# Patient Record
Sex: FEMALE | Race: WHITE | NOT HISPANIC OR LATINO | Employment: OTHER | URBAN - METROPOLITAN AREA
[De-identification: names, ages, dates, MRNs, and addresses within clinical notes are randomized per-mention and may not be internally consistent; named-entity substitution may affect disease eponyms.]

---

## 2017-03-22 ENCOUNTER — ALLSCRIPTS OFFICE VISIT (OUTPATIENT)
Dept: OTHER | Facility: OTHER | Age: 82
End: 2017-03-22

## 2017-03-22 LAB — HBA1C MFR BLD HPLC: 7.1 %

## 2017-04-06 ENCOUNTER — GENERIC CONVERSION - ENCOUNTER (OUTPATIENT)
Dept: OTHER | Facility: OTHER | Age: 82
End: 2017-04-06

## 2017-07-31 ENCOUNTER — GENERIC CONVERSION - ENCOUNTER (OUTPATIENT)
Dept: OTHER | Facility: OTHER | Age: 82
End: 2017-07-31

## 2017-07-31 LAB
LEFT EYE DIABETIC RETINOPATHY: NORMAL
RIGHT EYE DIABETIC RETINOPATHY: NORMAL

## 2017-08-04 ENCOUNTER — GENERIC CONVERSION - ENCOUNTER (OUTPATIENT)
Dept: OTHER | Facility: OTHER | Age: 82
End: 2017-08-04

## 2017-09-20 ENCOUNTER — ALLSCRIPTS OFFICE VISIT (OUTPATIENT)
Dept: OTHER | Facility: OTHER | Age: 82
End: 2017-09-20

## 2017-09-20 LAB — HBA1C MFR BLD HPLC: 7.2 %

## 2017-11-20 DIAGNOSIS — E78.5 HYPERLIPIDEMIA: ICD-10-CM

## 2017-11-20 DIAGNOSIS — E11.9 TYPE 2 DIABETES MELLITUS WITHOUT COMPLICATIONS (HCC): ICD-10-CM

## 2017-11-20 DIAGNOSIS — E11.65 TYPE 2 DIABETES MELLITUS WITH HYPERGLYCEMIA (HCC): ICD-10-CM

## 2017-11-20 DIAGNOSIS — Z00.00 ENCOUNTER FOR GENERAL ADULT MEDICAL EXAMINATION WITHOUT ABNORMAL FINDINGS: ICD-10-CM

## 2017-12-14 ENCOUNTER — TRANSCRIBE ORDERS (OUTPATIENT)
Dept: LAB | Facility: CLINIC | Age: 82
End: 2017-12-14

## 2017-12-14 ENCOUNTER — APPOINTMENT (OUTPATIENT)
Dept: LAB | Facility: CLINIC | Age: 82
End: 2017-12-14
Payer: MEDICARE

## 2017-12-14 DIAGNOSIS — Z00.00 ENCOUNTER FOR GENERAL ADULT MEDICAL EXAMINATION WITHOUT ABNORMAL FINDINGS: ICD-10-CM

## 2017-12-14 DIAGNOSIS — E78.5 HYPERLIPIDEMIA: ICD-10-CM

## 2017-12-14 DIAGNOSIS — E11.9 TYPE 2 DIABETES MELLITUS WITHOUT COMPLICATIONS (HCC): ICD-10-CM

## 2017-12-14 DIAGNOSIS — E11.65 TYPE 2 DIABETES MELLITUS WITH HYPERGLYCEMIA (HCC): ICD-10-CM

## 2017-12-14 LAB
ALBUMIN SERPL BCP-MCNC: 3.9 G/DL (ref 3.5–5)
ALP SERPL-CCNC: 73 U/L (ref 46–116)
ALT SERPL W P-5'-P-CCNC: 26 U/L (ref 12–78)
ANION GAP SERPL CALCULATED.3IONS-SCNC: 6 MMOL/L (ref 4–13)
AST SERPL W P-5'-P-CCNC: 19 U/L (ref 5–45)
BASOPHILS # BLD AUTO: 0.02 THOUSANDS/ΜL (ref 0–0.1)
BASOPHILS NFR BLD AUTO: 0 % (ref 0–1)
BILIRUB SERPL-MCNC: 0.64 MG/DL (ref 0.2–1)
BUN SERPL-MCNC: 17 MG/DL (ref 5–25)
CALCIUM SERPL-MCNC: 9.4 MG/DL (ref 8.3–10.1)
CHLORIDE SERPL-SCNC: 101 MMOL/L (ref 100–108)
CHOLEST SERPL-MCNC: 223 MG/DL (ref 50–200)
CO2 SERPL-SCNC: 28 MMOL/L (ref 21–32)
CREAT SERPL-MCNC: 0.68 MG/DL (ref 0.6–1.3)
EOSINOPHIL # BLD AUTO: 0.12 THOUSAND/ΜL (ref 0–0.61)
EOSINOPHIL NFR BLD AUTO: 2 % (ref 0–6)
ERYTHROCYTE [DISTWIDTH] IN BLOOD BY AUTOMATED COUNT: 12.8 % (ref 11.6–15.1)
GFR SERPL CREATININE-BSD FRML MDRD: 79 ML/MIN/1.73SQ M
GLUCOSE P FAST SERPL-MCNC: 181 MG/DL (ref 65–99)
HCT VFR BLD AUTO: 44.5 % (ref 34.8–46.1)
HDLC SERPL-MCNC: 62 MG/DL (ref 40–60)
HGB BLD-MCNC: 14.8 G/DL (ref 11.5–15.4)
LDLC SERPL CALC-MCNC: 139 MG/DL (ref 0–100)
LYMPHOCYTES # BLD AUTO: 1.93 THOUSANDS/ΜL (ref 0.6–4.47)
LYMPHOCYTES NFR BLD AUTO: 25 % (ref 14–44)
MCH RBC QN AUTO: 30.7 PG (ref 26.8–34.3)
MCHC RBC AUTO-ENTMCNC: 33.3 G/DL (ref 31.4–37.4)
MCV RBC AUTO: 92 FL (ref 82–98)
MONOCYTES # BLD AUTO: 0.83 THOUSAND/ΜL (ref 0.17–1.22)
MONOCYTES NFR BLD AUTO: 11 % (ref 4–12)
NEUTROPHILS # BLD AUTO: 4.69 THOUSANDS/ΜL (ref 1.85–7.62)
NEUTS SEG NFR BLD AUTO: 62 % (ref 43–75)
NRBC BLD AUTO-RTO: 0 /100 WBCS
PLATELET # BLD AUTO: 321 THOUSANDS/UL (ref 149–390)
PMV BLD AUTO: 9.7 FL (ref 8.9–12.7)
POTASSIUM SERPL-SCNC: 4.8 MMOL/L (ref 3.5–5.3)
PROT SERPL-MCNC: 7.8 G/DL (ref 6.4–8.2)
RBC # BLD AUTO: 4.82 MILLION/UL (ref 3.81–5.12)
SODIUM SERPL-SCNC: 135 MMOL/L (ref 136–145)
TRIGL SERPL-MCNC: 109 MG/DL
TSH SERPL DL<=0.05 MIU/L-ACNC: 3.02 UIU/ML (ref 0.36–3.74)
WBC # BLD AUTO: 7.62 THOUSAND/UL (ref 4.31–10.16)

## 2017-12-14 PROCEDURE — 84443 ASSAY THYROID STIM HORMONE: CPT

## 2017-12-14 PROCEDURE — 85025 COMPLETE CBC W/AUTO DIFF WBC: CPT

## 2017-12-14 PROCEDURE — 36415 COLL VENOUS BLD VENIPUNCTURE: CPT

## 2017-12-14 PROCEDURE — 80053 COMPREHEN METABOLIC PANEL: CPT

## 2017-12-14 PROCEDURE — 80061 LIPID PANEL: CPT

## 2017-12-17 ENCOUNTER — GENERIC CONVERSION - ENCOUNTER (OUTPATIENT)
Dept: OTHER | Facility: OTHER | Age: 82
End: 2017-12-17

## 2018-01-10 ENCOUNTER — ALLSCRIPTS OFFICE VISIT (OUTPATIENT)
Dept: OTHER | Facility: OTHER | Age: 83
End: 2018-01-10

## 2018-01-10 LAB — HBA1C MFR BLD HPLC: 7.9 %

## 2018-01-11 NOTE — RESULT NOTES
Verified Results  (1) COMPREHENSIVE METABOLIC PANEL [id number] 69:83GH Yolande Scott Order Number: [id number]     Test Name Result Flag Reference   GLUCOSE,RANDM 189 mg/dL H    If the patient is fasting, the ADA then defines impaired fasting glucose as > 100 mg/dL and diabetes as > or equal to 123 mg/dL  SODIUM 139 mmol/L  136-145   POTASSIUM 4 2 mmol/L  3 5-5 3   CHLORIDE 102 mmol/L  100-108   CARBON DIOXIDE 29 mmol/L  21-32   ANION GAP (CALC) 8 mmol/L  4-13   BLOOD UREA NITROGEN 26 mg/dL H 5-25   CREATININE 0 65 mg/dL  0 60-1 30   Standardized to IDMS reference method   CALCIUM 9 4 mg/dL  8 3-10 1   BILI, TOTAL 0 62 mg/dL  0 20-1 00   ALK PHOSPHATAS 73 U/L     ALT (SGPT) 24 U/L  12-78   AST(SGOT) 10 U/L  5-45   ALBUMIN 3 9 g/dL  3 5-5 0   TOTAL PROTEIN 7 5 g/dL  6 4-8 2   eGFR Non-African American      >60 0 ml/min/1 73sq m   - Patient Instructions: This is a fasting blood test  Water,black tea or black  coffee only after 9:00pm the night before test Drink 2 glasses of water the morning of test - Patient Instructions: This bloodwork is non-fasting  Please drink two glasses of   water morning of bloodwork  National Kidney Disease Education Program recommendations are as follows:  GFR calculation is accurate only with a steady state creatinine  Chronic Kidney disease less than 60 ml/min/1 73 sq  meters  Kidney failure less than 15 ml/min/1 73 sq  meters  (1) CBC/PLT/DIFF [id number] 08:42AM [facility], [place], [facility] Order Number: [id number]     Test Name Result Flag Reference   WBC COUNT 8 49 Thousand/uL  4 31-10 16   RBC COUNT 4 89 Million/uL  3 81-5 12   HEMOGLOBIN 15 1 g/dL  11 5-15 4   HEMATOCRIT 44 9 %  34 8-46  1   MCV 92 fL  82-98   MCH 30 9 pg  26 8-34 3   MCHC 33 6 g/dL  31 4-37 4   RDW 12 6 %  11 6-15 1   MPV 10 1 fL  8 9-12 7   PLATELET COUNT 219 Thousands/uL  149-390   nRBC AUTOMATED 0 /100 WBCs     NEUTROPHILS RELATIVE PERCENT 62 %  43-75   LYMPHOCYTES RELATIVE PERCENT 25 %  14-44   MONOCYTES RELATIVE PERCENT 12 %  4-12   EOSINOPHILS RELATIVE PERCENT 1 %  0-6   BASOPHILS RELATIVE PERCENT 0 %  0-1   NEUTROPHILS ABSOLUTE COUNT 5 21 Thousands/?L  1 85-7 62   LYMPHOCYTES ABSOLUTE COUNT 2 14 Thousands/?L  0 60-4 47   MONOCYTES ABSOLUTE COUNT 1 03 Thousand/?L  0 17-1 22   EOSINOPHILS ABSOLUTE COUNT 0 07 Thousand/?L  0 00-0 61   BASOPHILS ABSOLUTE COUNT 0 02 Thousands/?L  0 00-0 10   - Patient Instructions: This bloodwork is non-fasting  Please drink two glasses of water morning of bloodwork  - Patient Instructions: This bloodwork is non-fasting  Please drink two glasses of water morning of bloodwork  (1) HEMOGLOBIN A1C [id number] 08:42AM [facility] Order Number: [id number]     Test Name Result Flag Reference   HEMOGLOBIN A1C 7 1 % H 4 2-6 3   EST  AVG  GLUCOSE 157 mg/dl       (1) LIPID PANEL, FASTING [id number] 08:42AM [facility] Order Number: [id number]     Test Name Result Flag Reference   CHOLESTEROL 218 mg/dL H    HDL,DIRECT 64 mg/dL H 40-60   Specimen collection should occur prior to Metamizole administration due to the potential for falsely depressed results  LDL CHOLESTEROL CALCULATED 136 mg/dL H 0-100   - Patient Instructions: This is a fasting blood test  Water,black tea or black  coffee only after 9:00pm the night before test   Drink 2 glasses of water the morning of test     - Patient Instructions: This is a fasting blood test  Water,black tea or black  coffee only after 9:00pm the night before test Drink 2 glasses of water the morning of test - Patient Instructions: This bloodwork is non-fasting  Please drink two glasses of   water morning of bloodwork    Triglyceride:         Normal              <150 mg/dl       Borderline High    150-199 mg/dl       High               200-499 mg/dl       Very High          >499 mg/dl  Cholesterol:         Desirable        <200 mg/dl      Borderline High  200-239 mg/dl      High >239 mg/dl  HDL Cholesterol:        High    >59 mg/dL      Low     <41 mg/dL  LDL CALCULATED:    This screening LDL is a calculated result  It does not have the accuracy of the Direct Measured LDL in the monitoring of patients with hyperlipidemia and/or statin therapy  Direct Measure LDL (DFK100) must be ordered separately in these patients  TRIGLYCERIDES 92 mg/dL  <=150   Specimen collection should occur prior to N-Acetylcysteine or Metamizole administration due to the potential for falsely depressed results  (1) TSH [id number] 08:42AM Parksadelso Woodall    Order Number: [id number]     Test Name Result Flag Reference   TSH 3 000 uIU/mL  0 358-3 740   - Patient Instructions: This bloodwork is non-fasting  Please drink two glasses of water morning of bloodwork  - Patient Instructions: This is a fasting blood test  Water,black tea or black  coffee only after 9:00pm the night before test Drink 2 glasses of water the morning of test - Patient Instructions: This bloodwork is non-fasting  Please drink two glasses of   water morning of bloodwork  Patients undergoing fluorescein dye angiography may retain small amounts of fluorescein in the body for 48-72 hours post procedure  Samples containing fluorescein can produce falsely depressed TSH values  If the patient had this procedure,a specimen should be resubmitted post fluorescein clearance            The recommended reference ranges for TSH during pregnancy are as follows:  First trimester 0 1 to 2 5 uIU/mL  Second trimester  0 2 to 3 0 uIU/mL  Third trimester 0 3 to 3 0 uIU/m

## 2018-01-11 NOTE — RESULT NOTES
Verified Results  (1) HEMOGLOBIN A1C [id number] 11:01AM Stephanie Randle   TW Order Number: [id number]  TW Order Number: [id number]     Test Name Result Flag Reference   HEMOGLOBIN A1C 7 5 % H 4 2-6 3   EST  AVG  GLUCOSE 169 mg/dl       Blood Glucose- POC [id number] 10:30AM Stephanie Randle     Test Name Result Flag Reference   Glucose Finger Stick 80         Discussion/Summary   HgA1C continues to [employer] job!

## 2018-01-13 VITALS
TEMPERATURE: 97.5 F | OXYGEN SATURATION: 96 % | BODY MASS INDEX: 22.82 KG/M2 | HEART RATE: 100 BPM | SYSTOLIC BLOOD PRESSURE: 110 MMHG | HEIGHT: 62 IN | RESPIRATION RATE: 16 BRPM | DIASTOLIC BLOOD PRESSURE: 62 MMHG | WEIGHT: 124 LBS

## 2018-01-14 VITALS
HEART RATE: 80 BPM | SYSTOLIC BLOOD PRESSURE: 128 MMHG | TEMPERATURE: 97.2 F | WEIGHT: 132.6 LBS | HEIGHT: 62 IN | RESPIRATION RATE: 16 BRPM | DIASTOLIC BLOOD PRESSURE: 70 MMHG | BODY MASS INDEX: 24.4 KG/M2

## 2018-01-15 NOTE — PROGRESS NOTES
Assessment   1  Diabetes type 2, uncontrolled (250 02) (E11 65)   2  Arthritis (716 90) (M19 90)   3  Adult BMI 25 0-25 9 kg/sq m (V85 21) (Z68 25)   4  Hyperlipidemia (272 4) (E78 5)    Plan   Controlled diabetes mellitus    · MetFORMIN HCl - 500 MG Oral Tablet; take one tablet by mouth every day   · (1) HEMOGLOBIN A1C; Status:Complete;   Done: 74SOZ0901 10:58AM  Diabetes type 2, uncontrolled    · Hemoglobin A1c- POC; Status:Complete;   Done: 34PIX1273 11:02AM  Health Maintenance    · *VB - Urinary Incontinence Screen (Dx Z13 89 Screen for UI); Status:Complete;   Done:    40RIF3151 10:48AM    Discussion/Summary      Cont low fat, low chol, ADA diet as tolerated otc tylenol arthritis and analgesic cream over NSAID sec to hx htn  Possible side effects of new medications were reviewed with the patient/guardian today  The treatment plan was reviewed with the patient/guardian  The patient/guardian understands and agrees with the treatment plan      Chief Complaint   pt here to review BW and want to know whether she should take advil or tylenol ,she takes it everyday 2 to 6 tablets  tc/cma      History of Present Illness   79 yo pt- follow-up wnl Pt reports some noncompliance w diet over the Holidays  care utd  inc arthritic pains w the colder weather  Active Problems   1  Abnormal blood chemistry (790 6) (R79 9)   2  Abnormal blood sugar (790 29) (R73 09)   3  Arthritis (716 90) (M19 90)   4  Body mass index (BMI) of 23 0 to 23 9 in adult (V85 1) (Z68 23)   5  Body mass index (BMI) of 24 0 to 24 9 in adult (V85 1) (Z68 24)   6  Controlled diabetes mellitus (250 00) (E11 9)   7  Diabetes type 2, uncontrolled (250 02) (E11 65)   8  Hyperlipidemia (272 4) (E78 5)   9  Insomnia (780 52) (G47 00)   10  Limb pain (729 5) (M79 609)   11  Medicare annual wellness visit, subsequent (V70 0) (Z00 00)   12  Pain in joint (719 40) (M25 50)   13  Pain of upper extremity, unspecified laterality (729 5) (M79 603)   14   Right shoulder pain (719 41) (M25 511)    Past Medical History   1  History of Abrasion of knee, right (916 0) (S80 211A)   2  History of Cellulitis (682 9) (L03 90)   3  History of [place] [employer] On [address] [facility] (Not Motorcycle (Z504 6)   4  History of Dysfunction of Eustachian tube, unspecified laterality (381 81) (H69 80)   5  History of Encounter for Medicare annual wellness exam (V70 0) (Z00 00)   6  History of Fall, accidental (E888 9) (W19 XXXA)   7  History of cataract (V12 49) (Z86 69)   8  History of fatigue (V13 89) (Z87 898)   9  History of sinusitis (V12 69) (Z87 09)   10  History of Left Ankle Joint Pain (719 47)   11  History of Neck Strain (847 0)   12  History of Preop examination (V72 84) (Z01 818)    Family History   Family History    1  No pertinent family history    Social History    · Daily caffeine consumption   · Never a smoker   · Never A Smoker   · No drug use   · Occasional alcohol use   ·     Current Meds    1  Advil 200 MG Oral Capsule; Therapy: (Recorded:53Thr6732) to Recorded   2  MetFORMIN HCl - 500 MG Oral Tablet; take one tablet by mouth every day; Therapy: 19CKL1041 to (Last Oletha Sandifer)  Requested for: 20Sep2017 Ordered   3  PreserVision AREDS Oral Capsule; Therapy: (Recorded:89Cdi1331) to Recorded     The medication list was reviewed and updated today  Allergies   1  No Known Drug Allergies    Vitals   Vital Signs    Recorded: 19ZXB5802 10:36AM   Temperature 97 7 F, Temporal   Heart Rate 100, L Radial   Pulse Quality Normal, L Radial   Respiration Quality Normal   Respiration 16   Systolic 531, LUE, Sitting   Diastolic 72, LUE, Sitting   Height 5 ft 1 5 in   Weight 135 lb    BMI Calculated 25 1   BSA Calculated 1 61     Physical Exam        Constitutional      General appearance: No acute distress, well appearing and well nourished  Pulmonary      Respiratory effort: No increased work of breathing or signs of respiratory distress  Auscultation of lungs: Clear to auscultation  Cardiovascular      Auscultation of heart: Normal rate and rhythm, normal S1 and S2, without murmurs  Abdomen      Abdomen: Non-tender, no masses  Neurologic      Cranial nerves: Cranial nerves 2-12 intact  Psychiatric      Orientation to person, place, and time: Normal        Mood and affect: Normal           Results/Data   Hemoglobin A1c- POC 31XWX2755 11:02AM Homar Payne      Test Name Result Flag Reference   HEMOGLOBIN A1C 7 9        (1) HEMOGLOBIN A1C 48UWE6159 10:58AM Homar Payne      Test Name Result Flag Reference   HEMOGLOBIN A1C 7 9        *VB - Urinary Incontinence Screen (Dx Z13 89 Screen for UI) 46DPS7487 10:48AM Homar Payne      Test Name Result Flag Reference   Urinary Incontinence Assessment 77ODT9964          Future Appointments      Date/Time Provider Specialty Site   06/04/2018 10:00 AM JOSE Joseph   Family Medicine [address]     Signatures    Electronically signed by : JOSE Santiago ; Jan 14 2018  8:05PM EST                       (Author)

## 2018-01-17 NOTE — RESULT NOTES
Verified Results  (1) HEMOGLOBIN A1C 05Apr2016 08:32AM Ophelia Solis   TW Order Number: [id number]      5 7-6 4% impaired fasting glucose  >=6 5% diagnosis of diabetes    Falsely low levels are seen in conditions linked to short RBC life span-  hemolytic anemia, and splenomegaly  Falsely elevated levels are seen in situations where there is an increased production of RBC- receipt of erythropoietin or blood transfusions  Adopted from ADA-Clinical Practice Recommendations     Test Name Result Flag Reference   HEMOGLOBIN A1C 8 3 % H 4 0-5 6   EST  AVG  GLUCOSE 192 mg/dl       (1) COMPREHENSIVE METABOLIC PANEL 80ATE3966 25:02GL Ophelia Solis   TW Order Number: [id number]      National Kidney Disease Education Program recommendations are as follows:  GFR calculation is accurate only with a steady state creatinine  Chronic Kidney disease less than 60 ml/min/1 73 sq  meters  Kidney failure less than 15 ml/min/1 73 sq  meters  Test Name Result Flag Reference   GLUCOSE,RANDM 209 mg/dL H    SODIUM 138 mmol/L  136-145   POTASSIUM 4 4 mmol/L  3 5-5 3   CHLORIDE 103 mmol/L  100-108   CARBON DIOXIDE 29 mmol/L  21-32   ANION GAP (CALC) 6 mmol/L  4-13   BLOOD UREA NITROGEN 23 mg/dL  5-25   CREATININE 0 63 mg/dL  0 60-1 30   Standardized to IDMS reference method   CALCIUM 8 6 mg/dL  8 3-10 1   BILI, TOTAL 0 65 mg/dL  0 20-1 00   ALK PHOSPHATAS 72 U/L     ALT (SGPT) 19 U/L  12-78   AST(SGOT) 11 U/L  5-45   ALBUMIN 3 9 g/dL  3 5-5 0   TOTAL PROTEIN 7 1 g/dL  6 4-8 2   eGFR Non-African American      >60 0 ml/min/1 73sq m       Discussion/Summary   +Improvement but not yet at goal-would continue to watch diet and continue on the metformin once daily-please call with any further questions/concerns   Best Regards-Dr Keller Si

## 2018-01-22 VITALS
TEMPERATURE: 97.7 F | RESPIRATION RATE: 16 BRPM | HEART RATE: 100 BPM | BODY MASS INDEX: 24.84 KG/M2 | DIASTOLIC BLOOD PRESSURE: 72 MMHG | WEIGHT: 135 LBS | SYSTOLIC BLOOD PRESSURE: 132 MMHG | HEIGHT: 62 IN

## 2018-01-23 NOTE — PROGRESS NOTES
Assessment    1  Medicare annual wellness visit, subsequent (V70 0) (Z00 00)    Discussion/Summary  Impression: Subsequent Annual Wellness Visit, with preventive exam as well as age and risk appropriate counseling completed  Cardiovascular screening and counseling: the risks and benefits of screening were discussed, counseling was given on maintaining a healthy diet, counseling was given on maintaining a healthy weight, counseling was given on ways to improve cholesterol, counseling was given on ways to improve blood pressure and counseling was given on ways to improve exercise tolerance  Diabetes screening and counseling: the risks and benefits of screening were discussed, counseling was given on maintaining a healthy diet, counseling was given on maintaining a healthy weight and counseling was given on ways to improve physical activity  Colorectal cancer screening and counseling: the risks and benefits of screening were discussed and counseling was given on ways to eat a high fiber diet  Breast cancer screening and counseling: the risks and benefits of screening were discussed  Cervical cancer screening and counseling: screening not indicated  Osteoporosis screening and counseling: the risks and benefits of screening were discussed, counseling was given on obtaining adequate amounts of calcium and vitamin D on a daily basis and counseling was given on the importance of regular weightbearing exercise  Glaucoma screening and counseling: the risks and benefits of screening were discussed and screening is current  HIV screening and counseling: screening not indicated   Immunizations: the risks and benefits of influenza vaccination were discussed with the patient, the patient declines the influenza vaccination, the risks and benefits of pneumococcal vaccination were discussed with the patient, hepatitis B vaccination series is not indicated at this time due to the patient's low risk of dane the disease, the risks and benefits of the Zostavax vaccine were discussed with the patient, the risks and benefits of the Td vaccine were discussed with the patient and the risks and benefits of the Tdap vaccine were discussed with the patient  Advance Directive Planning: paperwork and instructions were given to the patient, she was encouraged to follow-up with me to discuss her questions and/or decisions  Patient Discussion: plan discussed with the patient, follow-up as needed  Chief Complaint  pt here for AWV      History of Present Illness  The patient is being seen for the subsequent annual wellness visit  Medicare Screening and Risk Factors   Hospitalizations: no previous hospitalizations  Once per lifetime medicare screening tests: ECG has not been done and AAA screening US has not yet been done  Medicare Screening Tests Risk Questions   Osteoporosis risk assessment: , female gender and over 48years of age, but none indicated  HIV risk assessment: none indicated  Drug and Alcohol Use: The patient has never smoked cigarettes  The patient reports never drinking alcohol  She has never used illicit drugs  Diet and Physical Activity: Current diet includes well balanced meals  She exercises daily and house work  Exercise: stretching  Mood Disorder and Cognitive Impairment Screening: She denies feeling down, depressed, or hopeless over the past two weeks  She denies feeling little interest or pleasure in doing things over the past two weeks  Cognitive impairment screening: denies difficulty learning/retaining new information, denies difficulty handling complex tasks, denies difficulty with reasoning, denies difficulty with spatial ability and orientation, denies difficulty with language and denies difficulty with behavior  Functional Ability/Level of Safety: Hearing is normal bilaterally, normal in the right ear and normal in the left ear  She denies hearing difficulties   She does not use a hearing aid  The patient is currently able to do activities of daily living with limitations, able to do instrumental activities of daily living with limitations, able to participate in social activities with limitations and unable to drive  Activities of daily living details: does not need help using the phone, no transportation help needed, does not need help shopping, no meal preparation help needed, does not need help doing housework, does not need help doing laundry, does not need help managing medications and does not need help managing money  Fall risk factors: The patient fell 0 times in the past 12 months  Injury History: no polypharmacy, no alcohol use, no mobility impairment, no antidepressant use, no deconditioning, no postural hypotension, no sedative use, no visual impairment, no urinary incontinence, no antihypertensive use, no cognitive impairment, up and go test was normal and no previous fall  Home safety risk factors:  loose rugs and no grab bars in the bathroom, but no unfamiliar surroundings, no poor household lighting, no uneven floors, no household clutter and handrails on the stairs  Advance Directives: Advance directives: Five Wishes Pamphlet given to patient, but no living will, no durable power of  for health care directives and no advance directives  end of life decisions were reviewed with the patient  Co-Managers and Medical Equipment/Suppliers: See Patient Care Team      Patient Care Team    Care Team Member Role Specialty Office Number   [id number] Lester Akins MD  Orthopedic Surgery [phone]   Sameer Roca MD  Ophthalmology [phone]     Active Problems    1  Abnormal blood chemistry (790 6) (R79 9)   2  Abnormal blood sugar (790 29) (R73 09)   3  Arthritis (716 90) (M17 90)   4  Body mass index (BMI) of 23 0 to 23 9 in adult (V85 1) (Z68 23)   5  Body mass index (BMI) of 24 0 to 24 9 in adult (V85 1) (Z68 24)   6   Controlled diabetes mellitus (250 00) (E11 9)   7  Diabetes type 2, uncontrolled (250 02) (E11 65)   8  Hyperlipidemia (272 4) (E78 5)   9  Insomnia (780 52) (G47 00)   10  Limb pain (729 5) (M79 609)   11  Medicare annual wellness visit, subsequent (V70 0) (Z00 00)   12  Pain in joint (719 40) (M25 50)   13  Pain of upper extremity, unspecified laterality (729 5) (M79 603)   14  Right shoulder pain (719 41) (M25 511)    Past Medical History    1  History of Abrasion of knee, right (916 0) (S80 211A)   2  History of Cellulitis (682 9) (L03 90)   3  History of [place] [employer] On [address] [facility] (Not Motorcycle (O435 4)   4  History of Dysfunction of Eustachian tube, unspecified laterality (381 81) (H69 80)   5  History of Encounter for Medicare annual wellness exam (V70 0) (Z00 00)   6  History of Fall, accidental (E888 9) (W19 XXXA)   7  History of cataract (V12 49) (Z86 69)   8  History of fatigue (V13 89) (Z87 898)   9  History of sinusitis (V12 69) (Z87 09)   10  History of Left Ankle Joint Pain (719 47)   11  History of Neck Strain (847 0)   12  History of Preop examination (V72 84) (J38 773)    The active problems and past medical history were reviewed and updated today  Surgical History    The surgical history was reviewed and updated today  Family History  Family History    1  No pertinent family history    The family history was reviewed and updated today  Social History    · Daily caffeine consumption   · Never a smoker   · Never A Smoker   · No drug use   · Occasional alcohol use   ·   The social history was reviewed and updated today  Current Meds   1  Advil 200 MG Oral Capsule; Therapy: (Recorded:26Oct2015) to Recorded   2  MetFORMIN HCl - 500 MG Oral Tablet; take one tablet by mouth every day; Therapy: 09EAG9475 to (Last Pancho Omalley)  Requested for: 20Sep2017 Ordered   3  PreserVision AREDS Oral Capsule;    Therapy: (Recorded:12Oct2016) to Recorded    The medication list was reviewed and updated today  Allergies    1  No Known Drug Allergies    Immunizations  Influenza --- Erven Brod: Temporarily Deferred: Pt refuses   Pneumo Other --- Series1: 2008     Vitals  Signs   Recorded: 88MPM4245 10:36AM   Temperature: 97 7 F, Temporal  Heart Rate: 100, L Radial  Pulse Quality: Normal, L Radial  Respiration Quality: Normal  Respiration: 16  Systolic: 761, LUE, Sitting  Diastolic: 72, LUE, Sitting  Height: 5 ft 1 5 in  Weight: 135 lb   BMI Calculated: 25 1  BSA Calculated: 1 61    Results/Data  Hemoglobin A1c- POC 49JCQ4434 11:02AM Wilman Novoa     Test Name Result Flag Reference   HEMOGLOBIN A1C 7 9       (1) HEMOGLOBIN A1C 93FIZ0595 10:58AM Edger Poor     Test Name Result Flag Reference   HEMOGLOBIN A1C 7 9       *VB - Urinary Incontinence Screen (Dx Z13 89 Screen for UI) 91SQE2376 10:48AM Wilman Novoa     Test Name Result Flag Reference   Urinary Incontinence Assessment [id number]         Procedure    Procedure:  dr William Denis yearly  Future Appointments    Date/Time Provider Specialty Site   06/04/2018 10:00 AM JOSE Palacios   Family Medicine [address]     Signatures   Electronically signed by : JOSE Moya ; Jan 14 2018  7:52PM EST                       (Author)

## 2018-01-23 NOTE — RESULT NOTES
Discussion/Summary   please call to discuss any further questions/conerns--Best Regards--Dr CANALES     Verified Results  (1) COMPREHENSIVE METABOLIC PANEL [id number] 93:52AA Jay Rubin Order Number: [id number]     Test Name Result Flag Reference   SODIUM 135 mmol/L L 136-145   POTASSIUM 4 8 mmol/L  3 5-5 3   CHLORIDE 101 mmol/L  100-108   CARBON DIOXIDE 28 mmol/L  21-32   ANION GAP (CALC) 6 mmol/L  4-13   BLOOD UREA NITROGEN 17 mg/dL  5-25   CREATININE 0 68 mg/dL  0 60-1 30   Standardized to IDMS reference method   CALCIUM 9 4 mg/dL  8 3-10 1   BILI, TOTAL 0 64 mg/dL  0 20-1 00   ALK PHOSPHATAS 73 U/L     ALT (SGPT) 26 U/L  12-78   Specimen collection should occur prior to Sulfasalazine and/or Sulfapyridine administration due to the potential for falsely depressed results  AST(SGOT) 19 U/L  5-45   Specimen collection should occur prior to Sulfasalazine administration due to the potential for falsely depressed results  ALBUMIN 3 9 g/dL  3 5-5 0   TOTAL PROTEIN 7 8 g/dL  6 4-8 2   eGFR 79 ml/min/1 73sq m     [facility] Disease Education Program recommendations are as follows:  GFR calculation is accurate only with a steady state creatinine  Chronic Kidney disease less than 60 ml/min/1 73 sq  meters  Kidney failure less than 15 ml/min/1 73 sq  meters  GLUCOSE FASTING 181 mg/dL H 65-99   Specimen collection should occur prior to Sulfasalazine administration due to the potential for falsely depressed results  Specimen collection should occur prior to Sulfapyridine administration due to the potential for falsely elevated results  (1) CBC/PLT/DIFF [id number] 08:32AM Rain Cuevas    Order Number: [id number]     Test Name Result Flag Reference   WBC COUNT 7 62 Thousand/uL  4 31-10 16   RBC COUNT 4 82 Million/uL  3 81-5 12   HEMOGLOBIN 14 8 g/dL  11 5-15 4   HEMATOCRIT 44 5 %  34 8-46  1   MCV 92 fL  82-98   MCH 30 7 pg  26 8-34 3   MCHC 33 3 g/dL  31 4-37 4   RDW 12 8 % 11 6-15 1   MPV 9 7 fL  8 9-12 7   PLATELET COUNT 119 Thousands/uL  149-390   nRBC AUTOMATED 0 /100 WBCs     NEUTROPHILS RELATIVE PERCENT 62 %  43-75   LYMPHOCYTES RELATIVE PERCENT 25 %  14-44   MONOCYTES RELATIVE PERCENT 11 %  4-12   EOSINOPHILS RELATIVE PERCENT 2 %  0-6   BASOPHILS RELATIVE PERCENT 0 %  0-1   NEUTROPHILS ABSOLUTE COUNT 4 69 Thousands/? ??L  1 85-7 62   LYMPHOCYTES ABSOLUTE COUNT 1 93 Thousands/? ??L  0 60-4 47   MONOCYTES ABSOLUTE COUNT 0 83 Thousand/? ??L  0 17-1 22   EOSINOPHILS ABSOLUTE COUNT 0 12 Thousand/? ??L  0 00-0 61   BASOPHILS ABSOLUTE COUNT 0 02 Thousands/? ??L  0 00-0 10     (1) TSH [id number] 08:32AM Eris ERNST Order Number: [id number]     Test Name Result Flag Reference   TSH 3 020 uIU/mL  0 358-3 740   Patients undergoing fluorescein dye angiography may retain small amounts of fluorescein in the body for 48-72 hours post procedure  Samples containing fluorescein can produce falsely depressed TSH values  If the patient had this procedure,a specimen should be resubmitted post fluorescein clearance  The recommended reference ranges for TSH during pregnancy are as follows:  First trimester 0 1 to 2 5 uIU/mL  Second trimester  0 2 to 3 0 uIU/mL  Third trimester 0 3 to 3 0 uIU/m     (1) LIPID PANEL, FASTING [id number] 08:32AM Cleo Lety Order Number: [id number]     Test Name Result Flag Reference   CHOLESTEROL 223 mg/dL H    HDL,DIRECT 62 mg/dL H 40-60   Specimen collection should occur prior to Metamizole administration due to the potential for falsley depressed results  LDL CHOLESTEROL CALCULATED 139 mg/dL H 0-100   Triglyceride:        Normal <150 mg/dl   Borderline High 150-199 mg/dl   High 200-499 mg/dl   Very High >499 mg/dl      Cholesterol:       Desirable <200 mg/dl    Borderline High 200-239 mg/dl    High >239 mg/dl      HDL Cholesterol:       High>59 mg/dL    Low <41 mg/dL      This screening LDL is a calculated result  It does not have the accuracy of the Direct Measured LDL in the monitoring of patients with hyperlipidemia and/or statin therapy  Direct Measure LDL (UER373) must be ordered separately in these patients  TRIGLYCERIDES 109 mg/dL  <=150   Specimen collection should occur prior to N-Acetylcysteine or Metamizole administration due to the potential for falsely depressed results

## 2018-02-26 NOTE — RESULT NOTES
Verified Results  (1) HEMOGLOBIN A1C [id number] 10:58AM Blaine Baezaley     Test Name Result Flag Reference   HEMOGLOBIN A1C 7 9         Plan  Controlled diabetes mellitus    · (1) HEMOGLOBIN A1C; Status:Complete;   Done: [id number] 10:58AM  Diabetes type 2, uncontrolled    · Hemoglobin A1c- POC; Status:Complete;   Done: [id number] 11:02AM  Health Maintenance    · *VB - Urinary Incontinence Screen (Dx Z13 89 Screen for UI);  Status:Complete;   Done:  [id number] 10:48AM

## 2018-04-20 ENCOUNTER — TELEPHONE (OUTPATIENT)
Dept: FAMILY MEDICINE CLINIC | Facility: CLINIC | Age: 83
End: 2018-04-20

## 2018-04-20 NOTE — TELEPHONE ENCOUNTER
Spoke w dtr -pt w no known ill exposure--no sig fever, no cp--has not vomited since this afternoon-  No bloody stool  Advised to maintain hydration and electrolytes   Advance to bland diet as tolerated  Call if any concerns/changes in condition

## 2018-04-20 NOTE — TELEPHONE ENCOUNTER
Dr Damon [place] pt daughter called to say pt is in bed throwing up and diarrhea  It came on in the middle of night   pls call pt daughter and advise her what to do for pt

## 2018-06-08 ENCOUNTER — OFFICE VISIT (OUTPATIENT)
Dept: FAMILY MEDICINE CLINIC | Facility: CLINIC | Age: 83
End: 2018-06-08
Payer: MEDICARE

## 2018-06-08 VITALS
WEIGHT: 130.3 LBS | TEMPERATURE: 97.7 F | DIASTOLIC BLOOD PRESSURE: 60 MMHG | HEART RATE: 80 BPM | BODY MASS INDEX: 26.27 KG/M2 | SYSTOLIC BLOOD PRESSURE: 118 MMHG | RESPIRATION RATE: 16 BRPM | HEIGHT: 59 IN

## 2018-06-08 DIAGNOSIS — E78.5 HYPERLIPIDEMIA, UNSPECIFIED HYPERLIPIDEMIA TYPE: ICD-10-CM

## 2018-06-08 DIAGNOSIS — E13.9 DIABETES 1.5, MANAGED AS TYPE 2 (HCC): Primary | ICD-10-CM

## 2018-06-08 DIAGNOSIS — M25.50 ARTHRALGIA, UNSPECIFIED JOINT: ICD-10-CM

## 2018-06-08 LAB
SL AMB  POCT GLUCOSE, UA: ABNORMAL
SL AMB LEUKOCYTE ESTERASE,UA: 500
SL AMB POCT BILIRUBIN,UA: ABNORMAL
SL AMB POCT BLOOD,UA: ABNORMAL
SL AMB POCT CLARITY,UA: CLEAR
SL AMB POCT COLOR,UA: YELLOW
SL AMB POCT HEMOGLOBIN AIC: 7.2
SL AMB POCT KETONES,UA: ABNORMAL
SL AMB POCT NITRITE,UA: ABNORMAL
SL AMB POCT PH,UA: 6
SL AMB POCT SPECIFIC GRAVITY,UA: 1.01
SL AMB POCT URINE PROTEIN: ABNORMAL
SL AMB POCT UROBILINOGEN: ABNORMAL

## 2018-06-08 PROCEDURE — 99214 OFFICE O/P EST MOD 30 MIN: CPT | Performed by: FAMILY MEDICINE

## 2018-06-08 PROCEDURE — 81003 URINALYSIS AUTO W/O SCOPE: CPT | Performed by: FAMILY MEDICINE

## 2018-06-08 PROCEDURE — 83036 HEMOGLOBIN GLYCOSYLATED A1C: CPT | Performed by: FAMILY MEDICINE

## 2018-06-08 RX ORDER — IBUPROFEN 200 MG
TABLET ORAL
COMMUNITY

## 2018-06-08 RX ORDER — ASPIRIN 81 MG/1
81 TABLET ORAL DAILY
COMMUNITY
End: 2022-07-11 | Stop reason: ALTCHOICE

## 2018-06-08 RX ORDER — VIT A/VIT C/VIT E/ZINC/COPPER 4296-226
CAPSULE ORAL
COMMUNITY

## 2018-06-08 NOTE — PATIENT INSTRUCTIONS
Diabetes in the Older Adult   AMBULATORY CARE:   What you need to know if you are an older adult with diabetes:  Older adults with diabetes are at risk for heart disease, stroke, kidney disease, blindness, and nerve damage  You may also be at risk for any of the following:  · Poor nutrition or low blood sugar levels    · Confusion or problems with memory, attention, or learning new things    · Trouble controlling urination or frequent urinary tract infections    · Trouble with coordination or balance    · Falls and injuries    · Pain    · Depression    · Open sores on your legs or feet  The ABCs of diabetes: The ABCs stand for certain things you can do to manage or prevent problems caused by diabetes:  · A  stands for A1c test   This test shows the average amount of sugar in your blood over the past 2 to 3 months  High levels of sugar in your blood can cause damage to your heart, blood vessels, kidneys, feet, and eyes  Most older adults with diabetes should have an A1c level less than 7 5  Ask your healthcare provider if this A1c goal is right for you  Your provider can help you make changes if your A1c is too high  · B  stands for blood pressure   High blood pressure can increase your risk for a heart attack, stroke, or kidney disease  Most older adults with diabetes should have a systolic blood pressure (first number) of 140  Your diastolic blood pressure (second number) should be below 90  Ask your healthcare provider if these blood pressure goals are right for you  · C  stands for cholesterol   High levels of cholesterol can block your arteries and cause a heart attack or stroke  Ask your healthcare provider what your cholesterol levels should be  · S  stands for stop smoking   Nicotine and other chemicals in cigarettes and cigars can cause lung damage and make it more difficult to manage your diabetes    Call 911 if you have any of the following:   · You have any of the following signs of a stroke: ¨ Numbness or drooping on one side of your face     ¨ Weakness in an arm or leg    ¨ Confusion or difficulty speaking    ¨ Dizziness, a severe headache, or vision loss    · You have any of the following signs of a heart attack:      ¨ Squeezing, pressure, or pain in your chest that lasts longer than 5 minutes or returns    ¨ Discomfort or pain in your back, neck, jaw, stomach, or arm     ¨ Trouble breathing    ¨ Nausea or vomiting    ¨ Lightheadedness or a sudden cold sweat, especially with chest pain or trouble breathing  Seek care immediately if:   · You have severe abdominal pain, or the pain spreads to your back  You may also be vomiting  · You have trouble staying awake or focusing  · You are shaking or sweating  · You have blurred or double vision  · Your breath has a fruity, sweet smell  · Your breathing is deep and labored, or rapid and shallow  · Your heartbeat is fast and weak  · You fall and get hurt  Contact your healthcare provider if:   · You are vomiting or have diarrhea  · You have an upset stomach and cannot eat the foods on your meal plan  · You feel weak or more tired than usual      · You feel dizzy, have headaches, or are easily irritated  · Your skin is red, warm, dry, or swollen  · You have a wound that does not heal      · You have numbness in your arms or legs  · You have trouble coping with your illness, or you feel anxious or depressed  · You have problems with your memory  · You have changes in your vision  · You have questions or concerns about your condition or care  Treatment for diabetes  includes keeping your blood sugar at a normal level  You must eat the right foods, and exercise regularly  You may need medicine if you cannot control your blood sugar level with nutrition and exercise  You may also need medicine to lower your blood pressure or cholesterol, or medicine to prevent blood clots     Manage the ABCs and prevent problems caused by diabetes:   · Check your blood sugar levels as directed  Your healthcare provider will tell you when and how often to check during the day  Your healthcare provider will also tell you what your blood sugar levels should be before and after a meal  You may need to check for ketones in your urine or blood if your level is higher than directed  Write down your results and show them to your healthcare provider  Your provider may use the results to make changes to your medicine, food, or exercise schedules  Ask your healthcare provider for more information about how to treat a high or low blood sugar level  · Follow your meal plan as directed  A dietitian will help you make a meal plan to keep your blood sugar level steady and make sure you get enough nutrition  Do not skip meals  Your blood sugar level may drop too low if you have taken diabetes medicine and do not eat  Ask your healthcare provider about programs in your community that can deliver the meals to your home  · Try to be active for 30 to 60 minutes most days of the week  Exercise can help keep your blood sugar level steady, decrease your risk of heart disease, and help you lose weight  It can also help improve your balance and decrease your risk for falls  Work with your healthcare provider to create an exercise plan  Ask a family member or friend to exercise with you  Start slow and exercise for 5 to 10 minutes at a time  Examples of activities include walking or swimming  Include muscle strengthening activities 2 to 3 days each week  Include balance training 2 to 3 times each week  Activities that help increase balance include yoga and pete chi      · Maintain a healthy weight  Ask your healthcare provider how much you should weigh  A healthy weight can help you control your diabetes and prevent heart disease  Ask your provider to help you create a weight loss plan if you are overweight   Together you can set manageable weight loss goals  · Do not smoke  Ask your healthcare provider for information if you currently smoke and need help to quit  Do not use e-cigarettes or smokeless tobacco in place of cigarettes or to help you quit  They still contain nicotine  · Manage stress  Stress may increase your blood sugar level  Deep breathing, muscle relaxation, and music may help you relax  Ask your healthcare provider for more information about these practices  Other ways to manage your diabetes:   · Check your feet every day for sores  Look at your whole foot, including the bottom, and between and under your toes  Check for wounds, corns, and calluses  Use a mirror to see the bottom of your feet  The skin on your feet may be shiny, tight, dry, or darker than normal  Your feet may also be cold and pale  Feel your feet by running your hands along the tops, bottoms, sides, and between your toes  Redness, swelling, and warmth are signs of blood flow problems that can lead to a foot ulcer  Do not try to remove corns or calluses yourself  · Wear medical alert identification  Wear medical alert jewelry or carry a card that says you have diabetes  Ask your healthcare provider where to get these items  · Ask about vaccines  You have a higher risk for serious illness if you get the flu, pneumonia, or hepatitis  Ask your healthcare provider if you should get a flu, pneumonia, shingles, or hepatitis B vaccine, and when to get the vaccine  · Keep all appointments  You may need to return to have your A1c checked every 3 months  You will need to return at least once each year to have your feet checked  You will need an eye exam once a year to check for retinopathy  You will also need urine tests every year to check for kidney problems  You may need tests to monitor for heart disease  Write down your questions so you remember to ask them during your visits  · Get help from family and friends    You may need help checking your blood sugar level, giving insulin injections, or preparing your meals  Ask your family and friends to help you with these tasks  Talk to your healthcare provider if you do not have someone at home to help you  A healthcare provider can come to your home to help you with these tasks  Follow up with your healthcare provider as directed: You may need to return to have your A1c checked every 3 months  You will need to return at least once each year to have your feet checked  You will need an eye exam once a year to check for retinopathy  You will also need urine tests every year to check for kidney problems  You may need tests to monitor for heart disease  Write down your questions so you remember to ask them during your visits  © 2017 [address] [facility] Information is for End User's use only and may not be sold, redistributed or otherwise used for commercial purposes  All illustrations and images included in CareNotes® are the copyrighted property of [employer]  or Derrek Ruiz  The above information is an  only  It is not intended as medical advice for individual conditions or treatments  Talk to your doctor, nurse or pharmacist before following any medical regimen to see if it is safe and effective for you
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
## 2018-06-10 NOTE — PROGRESS NOTES
Assessment/Plan:     Diagnoses and all orders for this visit:    Diabetes 1 5, managed as type 2 ([facility] [address] )  Comments:  cont metformin, cont ADA diet, regular activity as tolerated  Orders:  -     POCT urine dip auto non-scope  -     POCT hemoglobin A1c  -     Diabetic foot exam; Future  -     metFORMIN (GLUCOPHAGE) 500 mg tablet; Take 1 tablet (500 mg total) by mouth daily with breakfast    Hyperlipidemia, unspecified hyperlipidemia type    Arthralgia, unspecified joint  Comments:  otc tylenol/analgesic cream prn  Other orders  -     ibuprofen (ADVIL) 200 mg tablet; Take by mouth  -     Discontinue: metFORMIN (GLUCOPHAGE) 500 mg tablet;   -     Multiple Vitamins-Minerals (PRESERVISION AREDS) CAPS; Take by mouth  -     aspirin (ECOTRIN LOW STRENGTH) 81 mg EC tablet; Take 81 mg by mouth daily            Subjective:      Patient ID: Farzana Quiroga is a 80 y o  female  Chief Complaint   Patient presents with    Blood Sugar Problem    Medicare Wellness Visit       81 yo pt in w dtr for follow-up  BP wnl  QbD3I=9 2%  UA neg except for +leukos  Pt w some freq urination at nighttime-gets up 2-3 times-no burning-no d/c  Eye care UTD--awaiting new pair after accidentally breaking pair  Cont w arthritic pains  The following portions of the patient's history were reviewed and updated as appropriate: allergies, current medications, past family history, past medical history, past social history, past surgical history and problem list      Review of Systems   Constitutional: Negative  Eyes:        Wears glasses   Respiratory: Negative  Cardiovascular: Negative  Endocrine:        DM   Musculoskeletal: Positive for arthralgias and myalgias  Allergic/Immunologic: Positive for environmental allergies  Neurological: Negative  Psychiatric/Behavioral: Positive for sleep disturbance  The patient is nervous/anxious            Objective:    /60 (BP Location: Left arm, Patient Position: Sitting, Cuff Size: Standard)   Pulse 80   Temp 97 7 °F (36 5 °C)   Resp 16   Ht 4' 11" (1 499 m)   Wt 59 1 kg (130 lb 4 8 oz)   BMI 26 32 kg/m²        Physical Exam   Constitutional: She is oriented to person, place, and time  She appears well-developed and well-nourished  HENT:   Head: Normocephalic and atraumatic  Right Ear: External ear normal    Left Ear: External ear normal    Nose: Nose normal    Mouth/Throat: Oropharynx is clear and moist    Eyes: Conjunctivae and EOM are normal  Pupils are equal, round, and reactive to light  Neck: Normal range of motion  Neck supple  No thyromegaly present  Cardiovascular: Normal rate, regular rhythm, normal heart sounds and intact distal pulses  Exam reveals no gallop and no friction rub  No murmur heard  Pulmonary/Chest: Effort normal and breath sounds normal    Abdominal: Soft  Bowel sounds are normal  There is no tenderness  Musculoskeletal: Normal range of motion  She exhibits no edema, tenderness or deformity  Neurological: She is alert and oriented to person, place, and time  She displays normal reflexes  No cranial nerve deficit or sensory deficit  She exhibits normal muscle tone  Coordination normal    Skin: Skin is warm and dry  Capillary refill takes less than 2 seconds  No rash noted  Psychiatric: She has a normal mood and affect  Nursing note and vitals reviewed          Amparo Garza MD

## 2018-06-21 ENCOUNTER — TELEPHONE (OUTPATIENT)
Dept: FAMILY MEDICINE CLINIC | Facility: CLINIC | Age: 83
End: 2018-06-21

## 2018-06-21 NOTE — TELEPHONE ENCOUNTER
Dr Dinesh Echavarria,     Patients daughter said that her mothers legs are getting more puffy and they wanted to know if you need to see her again or not? Please call back to advise  Thank you

## 2018-06-21 NOTE — TELEPHONE ENCOUNTER
CALLED DAUGHTER AND SHE SAID BOTH LEGS ARE PUFFY, KNEE DOWN, NO REDNESS OR FLUID, SHE IS STILL WEARING ELGIN STOCKINGS AND ELEVATING FEET BUT TO NO AVAIL

## 2018-06-23 NOTE — TELEPHONE ENCOUNTER
Please find out if any new sxs-eg cp, shortness of breath,etc  and ask pt to stick to low sodium diet (no more than 2gm salt per day) -if no other sxs continue to monitor on low salt diet and follow-up in 2 weeks  Micki Antony

## 2018-10-12 DIAGNOSIS — E13.9 DIABETES 1.5, MANAGED AS TYPE 2 (HCC): ICD-10-CM

## 2018-11-30 ENCOUNTER — TRANSCRIBE ORDERS (OUTPATIENT)
Dept: LAB | Facility: CLINIC | Age: 83
End: 2018-11-30

## 2018-11-30 ENCOUNTER — TELEPHONE (OUTPATIENT)
Dept: FAMILY MEDICINE CLINIC | Facility: CLINIC | Age: 83
End: 2018-11-30

## 2018-11-30 DIAGNOSIS — E78.01 FAMILIAL HYPERCHOLESTEROLEMIA: Primary | ICD-10-CM

## 2018-11-30 DIAGNOSIS — E11.9 TYPE 2 DIABETES MELLITUS WITHOUT COMPLICATION, WITHOUT LONG-TERM CURRENT USE OF INSULIN (HCC): ICD-10-CM

## 2018-11-30 NOTE — TELEPHONE ENCOUNTER
Dr Gill Whitney    Patient has dm appointment with you 12/10  Daughter is requesting order for labs  Please call when ready for

## 2018-12-03 ENCOUNTER — TRANSCRIBE ORDERS (OUTPATIENT)
Dept: LAB | Facility: CLINIC | Age: 83
End: 2018-12-03

## 2018-12-03 ENCOUNTER — APPOINTMENT (OUTPATIENT)
Dept: LAB | Facility: CLINIC | Age: 83
End: 2018-12-03
Payer: MEDICARE

## 2018-12-03 DIAGNOSIS — E78.01 FAMILIAL HYPERCHOLESTEROLEMIA: ICD-10-CM

## 2018-12-03 DIAGNOSIS — E11.9 TYPE 2 DIABETES MELLITUS WITHOUT COMPLICATION, WITHOUT LONG-TERM CURRENT USE OF INSULIN (HCC): ICD-10-CM

## 2018-12-03 LAB
ALBUMIN SERPL BCP-MCNC: 3.9 G/DL (ref 3.5–5)
ALP SERPL-CCNC: 69 U/L (ref 46–116)
ALT SERPL W P-5'-P-CCNC: 25 U/L (ref 12–78)
ANION GAP SERPL CALCULATED.3IONS-SCNC: 5 MMOL/L (ref 4–13)
AST SERPL W P-5'-P-CCNC: 14 U/L (ref 5–45)
BILIRUB SERPL-MCNC: 0.49 MG/DL (ref 0.2–1)
BUN SERPL-MCNC: 19 MG/DL (ref 5–25)
CALCIUM SERPL-MCNC: 9.1 MG/DL (ref 8.3–10.1)
CHLORIDE SERPL-SCNC: 103 MMOL/L (ref 100–108)
CHOLEST SERPL-MCNC: 197 MG/DL (ref 50–200)
CO2 SERPL-SCNC: 29 MMOL/L (ref 21–32)
CREAT SERPL-MCNC: 0.64 MG/DL (ref 0.6–1.3)
ERYTHROCYTE [DISTWIDTH] IN BLOOD BY AUTOMATED COUNT: 12.4 % (ref 11.6–15.1)
EST. AVERAGE GLUCOSE BLD GHB EST-MCNC: 169 MG/DL
GFR SERPL CREATININE-BSD FRML MDRD: 80 ML/MIN/1.73SQ M
GLUCOSE P FAST SERPL-MCNC: 161 MG/DL (ref 65–99)
HBA1C MFR BLD: 7.5 % (ref 4.2–6.3)
HCT VFR BLD AUTO: 41.3 % (ref 34.8–46.1)
HDLC SERPL-MCNC: 63 MG/DL (ref 40–60)
HGB BLD-MCNC: 13.3 G/DL (ref 11.5–15.4)
LDLC SERPL CALC-MCNC: 116 MG/DL (ref 0–100)
MCH RBC QN AUTO: 30.3 PG (ref 26.8–34.3)
MCHC RBC AUTO-ENTMCNC: 32.2 G/DL (ref 31.4–37.4)
MCV RBC AUTO: 94 FL (ref 82–98)
NONHDLC SERPL-MCNC: 134 MG/DL
PLATELET # BLD AUTO: 294 THOUSANDS/UL (ref 149–390)
PMV BLD AUTO: 9.8 FL (ref 8.9–12.7)
POTASSIUM SERPL-SCNC: 4.4 MMOL/L (ref 3.5–5.3)
PROT SERPL-MCNC: 7.2 G/DL (ref 6.4–8.2)
RBC # BLD AUTO: 4.39 MILLION/UL (ref 3.81–5.12)
SODIUM SERPL-SCNC: 137 MMOL/L (ref 136–145)
TRIGL SERPL-MCNC: 88 MG/DL
TSH SERPL DL<=0.05 MIU/L-ACNC: 2.79 UIU/ML (ref 0.36–3.74)
WBC # BLD AUTO: 6.39 THOUSAND/UL (ref 4.31–10.16)

## 2018-12-03 PROCEDURE — 80053 COMPREHEN METABOLIC PANEL: CPT

## 2018-12-03 PROCEDURE — 83036 HEMOGLOBIN GLYCOSYLATED A1C: CPT

## 2018-12-03 PROCEDURE — 80061 LIPID PANEL: CPT

## 2018-12-03 PROCEDURE — 85027 COMPLETE CBC AUTOMATED: CPT

## 2018-12-03 PROCEDURE — 36415 COLL VENOUS BLD VENIPUNCTURE: CPT

## 2018-12-03 PROCEDURE — 84443 ASSAY THYROID STIM HORMONE: CPT

## 2018-12-10 ENCOUNTER — OFFICE VISIT (OUTPATIENT)
Dept: FAMILY MEDICINE CLINIC | Facility: CLINIC | Age: 83
End: 2018-12-10
Payer: MEDICARE

## 2018-12-10 VITALS
DIASTOLIC BLOOD PRESSURE: 62 MMHG | BODY MASS INDEX: 27.34 KG/M2 | SYSTOLIC BLOOD PRESSURE: 122 MMHG | WEIGHT: 135.6 LBS | RESPIRATION RATE: 16 BRPM | HEIGHT: 59 IN | TEMPERATURE: 97 F | HEART RATE: 100 BPM

## 2018-12-10 DIAGNOSIS — E11.9 TYPE 2 DIABETES MELLITUS WITHOUT COMPLICATION, WITHOUT LONG-TERM CURRENT USE OF INSULIN (HCC): Primary | ICD-10-CM

## 2018-12-10 DIAGNOSIS — R31.9 HEMATURIA, UNSPECIFIED TYPE: ICD-10-CM

## 2018-12-10 DIAGNOSIS — E78.5 HYPERLIPIDEMIA, UNSPECIFIED HYPERLIPIDEMIA TYPE: ICD-10-CM

## 2018-12-10 LAB
SL AMB  POCT GLUCOSE, UA: ABNORMAL
SL AMB LEUKOCYTE ESTERASE,UA: 500
SL AMB POCT BILIRUBIN,UA: ABNORMAL
SL AMB POCT BLOOD,UA: 50
SL AMB POCT CLARITY,UA: ABNORMAL
SL AMB POCT COLOR,UA: YELLOW
SL AMB POCT KETONES,UA: ABNORMAL
SL AMB POCT NITRITE,UA: ABNORMAL
SL AMB POCT PH,UA: 5
SL AMB POCT SPECIFIC GRAVITY,UA: 1.01
SL AMB POCT URINE PROTEIN: ABNORMAL
SL AMB POCT UROBILINOGEN: ABNORMAL

## 2018-12-10 PROCEDURE — 99214 OFFICE O/P EST MOD 30 MIN: CPT | Performed by: FAMILY MEDICINE

## 2018-12-10 PROCEDURE — 81003 URINALYSIS AUTO W/O SCOPE: CPT | Performed by: FAMILY MEDICINE

## 2018-12-13 LAB
ALBUMIN/CREAT UR: 170 MG/G CREAT (ref 0–30)
BACTERIA UR CULT: ABNORMAL
CREAT UR-MCNC: 62.7 MG/DL
LABCORP COMMENT: NORMAL
Lab: ABNORMAL
MICROALBUMIN UR-MCNC: 106.6 UG/ML
SL AMB ANTIMICROBIAL SUSCEPTIBILITY: ABNORMAL

## 2018-12-31 NOTE — PROGRESS NOTES
Assessment/Plan:   Diagnoses and all orders for this visit:    Type 2 diabetes mellitus without complication, without long-term current use of insulin ([facility] [address] )  -     Diabetic foot exam; Future  -     Microalbumin / creatinine urine ratio  -     POCT urine dip auto non-scope  -     metFORMIN (GLUCOPHAGE) 500 mg tablet; Take 1 tablet (500 mg total) by mouth daily with breakfast  -     Comprehensive metabolic panel; Future  -     HEMOGLOBIN A1C W/ EAG ESTIMATION; Future    Hematuria, unspecified type  Comments:  inc  fluids, lo oxalate diet, check urine cx  Orders:  -     Urine culture    Hyperlipidemia, unspecified hyperlipidemia type  Comments:  check lipid profile  Orders:  -     Lipid panel; Future    Other orders  -     Urine culture  -     Result  -     Specimen Status Report            Subjective:      Patient ID: Sheri Salguero is a 80 y o  female  Chief Complaint   Patient presents with    Diabetes     f/u results, need script for next blood work       Diabetes   She presents for her follow-up diabetic visit  She has type 2 diabetes mellitus  Her disease course has been fluctuating  Hypoglycemia symptoms include headaches, nervousness/anxiousness and sleepiness  Associated symptoms include fatigue and polyuria  There are no hypoglycemic complications  There are no diabetic complications  Risk factors for coronary artery disease include dyslipidemia, diabetes mellitus, post-menopausal and stress  Current diabetic treatment includes diet and oral agent (monotherapy)  She is compliant with treatment most of the time  Her weight is fluctuating minimally  She is following a diabetic diet  She has not had a previous visit with a dietitian  She participates in exercise daily  Her home blood glucose trend is fluctuating minimally  An ACE inhibitor/angiotensin II receptor blocker is not being taken  She does not see a podiatrist Eye exam is current  ZlX1K=3 5% today n office--up from 7 2%    UA-+leukos and blood--asymptomatic  Due to check lipids    The following portions of the patient's history were reviewed and updated as appropriate: allergies, current medications, past family history, past medical history, past social history, past surgical history and problem list      Review of Systems   Constitutional: Positive for fatigue  Respiratory: Negative  Cardiovascular: Negative  Gastrointestinal: Negative  Endocrine: Positive for polyuria  DM   Musculoskeletal: Positive for arthralgias  Allergic/Immunologic: Positive for environmental allergies  Neurological: Positive for headaches  Psychiatric/Behavioral: Positive for sleep disturbance  The patient is nervous/anxious  Objective:    /62 (BP Location: Left arm, Patient Position: Sitting, Cuff Size: Standard)   Pulse 100   Temp (!) 97 °F (36 1 °C)   Resp 16   Ht 4' 11" (1 499 m)   Wt 61 5 kg (135 lb 9 6 oz)   BMI 27 39 kg/m²        Physical Exam   Constitutional: She appears well-developed and well-nourished  No distress  HENT:   Mouth/Throat: No oropharyngeal exudate  Eyes: Conjunctivae are normal  No scleral icterus  Neck: Neck supple  Cardiovascular: Normal rate, regular rhythm and intact distal pulses  Pulses:       Dorsalis pedis pulses are 2+ on the right side, and 2+ on the left side  Posterior tibial pulses are 2+ on the right side, and 2+ on the left side  Pulmonary/Chest: Effort normal and breath sounds normal  No respiratory distress  Abdominal: Soft  Bowel sounds are normal  There is no tenderness  Musculoskeletal: She exhibits tenderness  Right foot: There is normal range of motion and no deformity  Left foot: There is normal range of motion and no deformity  Feet:   Right Foot:   Skin Integrity: Positive for dry skin  Left Foot:   Skin Integrity: Positive for dry skin  Neurological: She is alert  Skin: Skin is warm and dry  No rash noted     Psychiatric: She has a normal mood and affect  Nursing note and vitals reviewed          Ashly Munson MD

## 2019-05-31 DIAGNOSIS — E11.9 TYPE 2 DIABETES MELLITUS WITHOUT COMPLICATION, WITHOUT LONG-TERM CURRENT USE OF INSULIN (HCC): ICD-10-CM

## 2019-08-02 DIAGNOSIS — E11.9 TYPE 2 DIABETES MELLITUS WITHOUT COMPLICATION, WITHOUT LONG-TERM CURRENT USE OF INSULIN (HCC): ICD-10-CM

## 2019-09-06 ENCOUNTER — OFFICE VISIT (OUTPATIENT)
Dept: FAMILY MEDICINE CLINIC | Facility: CLINIC | Age: 84
End: 2019-09-06
Payer: MEDICARE

## 2019-09-06 VITALS
RESPIRATION RATE: 14 BRPM | DIASTOLIC BLOOD PRESSURE: 70 MMHG | SYSTOLIC BLOOD PRESSURE: 128 MMHG | HEIGHT: 59 IN | TEMPERATURE: 97.3 F | HEART RATE: 100 BPM | BODY MASS INDEX: 27.78 KG/M2 | WEIGHT: 137.8 LBS

## 2019-09-06 DIAGNOSIS — E11.9 CONTROLLED TYPE 2 DIABETES MELLITUS WITHOUT COMPLICATION, WITHOUT LONG-TERM CURRENT USE OF INSULIN (HCC): Primary | ICD-10-CM

## 2019-09-06 DIAGNOSIS — E78.5 HYPERLIPIDEMIA, UNSPECIFIED HYPERLIPIDEMIA TYPE: ICD-10-CM

## 2019-09-06 DIAGNOSIS — B35.1 FUNGAL NAIL INFECTION: ICD-10-CM

## 2019-09-06 DIAGNOSIS — M25.50 ARTHRALGIA, UNSPECIFIED JOINT: ICD-10-CM

## 2019-09-06 LAB — SL AMB POCT HEMOGLOBIN AIC: 7 (ref ?–6.5)

## 2019-09-06 PROCEDURE — 99214 OFFICE O/P EST MOD 30 MIN: CPT | Performed by: FAMILY MEDICINE

## 2019-09-06 PROCEDURE — 83036 HEMOGLOBIN GLYCOSYLATED A1C: CPT | Performed by: FAMILY MEDICINE

## 2019-09-26 ENCOUNTER — NEW PATIENT (OUTPATIENT)
Dept: URBAN - METROPOLITAN AREA CLINIC 27 | Facility: CLINIC | Age: 84
End: 2019-09-26

## 2019-09-26 DIAGNOSIS — H43.823: ICD-10-CM

## 2019-09-26 DIAGNOSIS — H35.3112: ICD-10-CM

## 2019-09-26 DIAGNOSIS — E11.9: ICD-10-CM

## 2019-09-26 DIAGNOSIS — H35.3221: ICD-10-CM

## 2019-09-26 LAB
LEFT EYE DIABETIC RETINOPATHY: NORMAL
RIGHT EYE DIABETIC RETINOPATHY: NORMAL

## 2019-09-26 PROCEDURE — PFS LUCENTIS 0.5MG PREFILLED SYRINGE

## 2019-09-26 PROCEDURE — 99204 OFFICE O/P NEW MOD 45 MIN: CPT | Mod: 25

## 2019-09-26 PROCEDURE — 92134 CPTRZ OPH DX IMG PST SGM RTA: CPT

## 2019-09-26 PROCEDURE — 92235 FLUORESCEIN ANGRPH MLTIFRAME: CPT

## 2019-09-26 PROCEDURE — 92250 FUNDUS PHOTOGRAPHY W/I&R: CPT

## 2019-09-26 PROCEDURE — 67028 INJECTION EYE DRUG: CPT

## 2019-09-26 ASSESSMENT — VISUAL ACUITY
OD_CC: 20/40-2
OS_CC: 20/50-3

## 2019-09-26 ASSESSMENT — TONOMETRY
OS_IOP_MMHG: 18
OD_IOP_MMHG: 18
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
## 2019-11-21 ENCOUNTER — FOLLOW UP (OUTPATIENT)
Dept: URBAN - METROPOLITAN AREA CLINIC 27 | Facility: CLINIC | Age: 84
End: 2019-11-21

## 2019-11-21 DIAGNOSIS — E11.9: ICD-10-CM

## 2019-11-21 DIAGNOSIS — H43.823: ICD-10-CM

## 2019-11-21 DIAGNOSIS — H35.3221: ICD-10-CM

## 2019-11-21 DIAGNOSIS — H35.3112: ICD-10-CM

## 2019-11-21 PROCEDURE — 92134 CPTRZ OPH DX IMG PST SGM RTA: CPT

## 2019-11-21 PROCEDURE — PFS LUCENTIS 0.5MG PREFILLED SYRINGE

## 2019-11-21 PROCEDURE — 67028 INJECTION EYE DRUG: CPT

## 2019-11-21 PROCEDURE — 92012 INTRM OPH EXAM EST PATIENT: CPT | Mod: 25

## 2019-11-21 ASSESSMENT — TONOMETRY: OS_IOP_MMHG: 18

## 2019-11-21 ASSESSMENT — VISUAL ACUITY
OS_PH: 20/80
OS_CC: 20/100

## 2019-12-09 ENCOUNTER — LAB (OUTPATIENT)
Dept: LAB | Facility: CLINIC | Age: 84
End: 2019-12-09
Payer: MEDICARE

## 2019-12-09 DIAGNOSIS — E78.5 HYPERLIPIDEMIA, UNSPECIFIED HYPERLIPIDEMIA TYPE: ICD-10-CM

## 2019-12-09 DIAGNOSIS — M25.50 ARTHRALGIA, UNSPECIFIED JOINT: ICD-10-CM

## 2019-12-09 DIAGNOSIS — E11.9 CONTROLLED TYPE 2 DIABETES MELLITUS WITHOUT COMPLICATION, WITHOUT LONG-TERM CURRENT USE OF INSULIN (HCC): ICD-10-CM

## 2019-12-09 LAB
ALBUMIN SERPL BCP-MCNC: 4.2 G/DL (ref 3.5–5)
ALP SERPL-CCNC: 79 U/L (ref 46–116)
ALT SERPL W P-5'-P-CCNC: 28 U/L (ref 12–78)
ANION GAP SERPL CALCULATED.3IONS-SCNC: 5 MMOL/L (ref 4–13)
AST SERPL W P-5'-P-CCNC: 14 U/L (ref 5–45)
BILIRUB SERPL-MCNC: 0.48 MG/DL (ref 0.2–1)
BUN SERPL-MCNC: 27 MG/DL (ref 5–25)
CALCIUM SERPL-MCNC: 9.4 MG/DL (ref 8.3–10.1)
CHLORIDE SERPL-SCNC: 106 MMOL/L (ref 100–108)
CHOLEST SERPL-MCNC: 226 MG/DL (ref 50–200)
CO2 SERPL-SCNC: 28 MMOL/L (ref 21–32)
CREAT SERPL-MCNC: 0.78 MG/DL (ref 0.6–1.3)
ERYTHROCYTE [DISTWIDTH] IN BLOOD BY AUTOMATED COUNT: 12.9 % (ref 11.6–15.1)
EST. AVERAGE GLUCOSE BLD GHB EST-MCNC: 180 MG/DL
GFR SERPL CREATININE-BSD FRML MDRD: 68 ML/MIN/1.73SQ M
GLUCOSE P FAST SERPL-MCNC: 217 MG/DL (ref 65–99)
HBA1C MFR BLD: 7.9 % (ref 4.2–6.3)
HCT VFR BLD AUTO: 43.3 % (ref 34.8–46.1)
HDLC SERPL-MCNC: 63 MG/DL
HGB BLD-MCNC: 13.9 G/DL (ref 11.5–15.4)
LDLC SERPL CALC-MCNC: 150 MG/DL (ref 0–100)
MCH RBC QN AUTO: 30 PG (ref 26.8–34.3)
MCHC RBC AUTO-ENTMCNC: 32.1 G/DL (ref 31.4–37.4)
MCV RBC AUTO: 93 FL (ref 82–98)
PLATELET # BLD AUTO: 269 THOUSANDS/UL (ref 149–390)
PMV BLD AUTO: 9.6 FL (ref 8.9–12.7)
POTASSIUM SERPL-SCNC: 4.6 MMOL/L (ref 3.5–5.3)
PROT SERPL-MCNC: 6.8 G/DL (ref 6.4–8.2)
RBC # BLD AUTO: 4.64 MILLION/UL (ref 3.81–5.12)
SODIUM SERPL-SCNC: 139 MMOL/L (ref 136–145)
TRIGL SERPL-MCNC: 66 MG/DL
TSH SERPL DL<=0.05 MIU/L-ACNC: 4.33 UIU/ML (ref 0.36–3.74)
WBC # BLD AUTO: 6.9 THOUSAND/UL (ref 4.31–10.16)

## 2019-12-09 PROCEDURE — 85027 COMPLETE CBC AUTOMATED: CPT

## 2019-12-09 PROCEDURE — 80053 COMPREHEN METABOLIC PANEL: CPT

## 2019-12-09 PROCEDURE — 84443 ASSAY THYROID STIM HORMONE: CPT

## 2019-12-09 PROCEDURE — 36415 COLL VENOUS BLD VENIPUNCTURE: CPT

## 2019-12-09 PROCEDURE — 83036 HEMOGLOBIN GLYCOSYLATED A1C: CPT

## 2019-12-09 PROCEDURE — 80061 LIPID PANEL: CPT

## 2020-01-07 ENCOUNTER — OFFICE VISIT (OUTPATIENT)
Dept: FAMILY MEDICINE CLINIC | Facility: CLINIC | Age: 85
End: 2020-01-07
Payer: MEDICARE

## 2020-01-07 VITALS
HEART RATE: 92 BPM | HEIGHT: 59 IN | RESPIRATION RATE: 16 BRPM | DIASTOLIC BLOOD PRESSURE: 78 MMHG | WEIGHT: 144 LBS | SYSTOLIC BLOOD PRESSURE: 132 MMHG | BODY MASS INDEX: 29.03 KG/M2 | TEMPERATURE: 97.9 F

## 2020-01-07 DIAGNOSIS — B35.1 FUNGAL NAIL INFECTION: ICD-10-CM

## 2020-01-07 DIAGNOSIS — E78.5 HYPERLIPIDEMIA, UNSPECIFIED HYPERLIPIDEMIA TYPE: ICD-10-CM

## 2020-01-07 DIAGNOSIS — E03.9 BORDERLINE HYPOTHYROIDISM: ICD-10-CM

## 2020-01-07 DIAGNOSIS — E11.65 TYPE 2 DIABETES MELLITUS WITH HYPERGLYCEMIA, WITHOUT LONG-TERM CURRENT USE OF INSULIN (HCC): ICD-10-CM

## 2020-01-07 PROCEDURE — 1160F RVW MEDS BY RX/DR IN RCRD: CPT | Performed by: FAMILY MEDICINE

## 2020-01-07 PROCEDURE — 2022F DILAT RTA XM EVC RTNOPTHY: CPT | Performed by: FAMILY MEDICINE

## 2020-01-07 PROCEDURE — 3288F FALL RISK ASSESSMENT DOCD: CPT | Performed by: FAMILY MEDICINE

## 2020-01-07 PROCEDURE — 99214 OFFICE O/P EST MOD 30 MIN: CPT | Performed by: FAMILY MEDICINE

## 2020-01-07 PROCEDURE — 1101F PT FALLS ASSESS-DOCD LE1/YR: CPT | Performed by: FAMILY MEDICINE

## 2020-01-07 PROCEDURE — 1036F TOBACCO NON-USER: CPT | Performed by: FAMILY MEDICINE

## 2020-01-07 PROCEDURE — 4040F PNEUMOC VAC/ADMIN/RCVD: CPT | Performed by: FAMILY MEDICINE

## 2020-01-09 ENCOUNTER — FOLLOW UP (OUTPATIENT)
Dept: URBAN - METROPOLITAN AREA CLINIC 27 | Facility: CLINIC | Age: 85
End: 2020-01-09

## 2020-01-09 DIAGNOSIS — H43.823: ICD-10-CM

## 2020-01-09 DIAGNOSIS — H35.3221: ICD-10-CM

## 2020-01-09 DIAGNOSIS — H35.3112: ICD-10-CM

## 2020-01-09 DIAGNOSIS — E11.9: ICD-10-CM

## 2020-01-09 PROCEDURE — 92012 INTRM OPH EXAM EST PATIENT: CPT | Mod: 25

## 2020-01-09 PROCEDURE — 67028 INJECTION EYE DRUG: CPT

## 2020-01-09 PROCEDURE — 92134 CPTRZ OPH DX IMG PST SGM RTA: CPT

## 2020-01-09 PROCEDURE — PFS LUCENTIS 0.5MG PREFILLED SYRINGE

## 2020-01-09 ASSESSMENT — TONOMETRY: OS_IOP_MMHG: 18

## 2020-01-09 ASSESSMENT — VISUAL ACUITY: OS_CC: 20/50
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
## 2020-02-08 NOTE — ASSESSMENT & PLAN NOTE
BMI Counseling: Body mass index is 29 08 kg/m²  Discussed the patient's BMI with her  The BMI is above normal  Nutrition recommendations include 3-5 servings of fruits/vegetables daily, consuming healthier snacks and decreasing soda and/or juice intake  Exercise recommendations include exercising 3-5 times per week

## 2020-03-12 ENCOUNTER — FOLLOW UP (OUTPATIENT)
Dept: URBAN - METROPOLITAN AREA CLINIC 27 | Facility: CLINIC | Age: 85
End: 2020-03-12

## 2020-03-12 DIAGNOSIS — E11.9: ICD-10-CM

## 2020-03-12 DIAGNOSIS — H35.3221: ICD-10-CM

## 2020-03-12 DIAGNOSIS — H35.3112: ICD-10-CM

## 2020-03-12 DIAGNOSIS — H43.823: ICD-10-CM

## 2020-03-12 PROCEDURE — 92134 CPTRZ OPH DX IMG PST SGM RTA: CPT

## 2020-03-12 PROCEDURE — 67028 INJECTION EYE DRUG: CPT

## 2020-03-12 PROCEDURE — 92014 COMPRE OPH EXAM EST PT 1/>: CPT | Mod: 25

## 2020-03-12 PROCEDURE — PFS LUCENTIS 0.5MG PREFILLED SYRINGE

## 2020-03-12 ASSESSMENT — VISUAL ACUITY
OS_CC: 20/50+
OD_CC: 20/30

## 2020-03-12 ASSESSMENT — TONOMETRY
OD_IOP_MMHG: 13
OS_IOP_MMHG: 15

## 2020-04-29 ENCOUNTER — APPOINTMENT (OUTPATIENT)
Dept: LAB | Facility: CLINIC | Age: 85
End: 2020-04-29
Payer: MEDICARE

## 2020-04-29 DIAGNOSIS — E78.5 HYPERLIPIDEMIA, UNSPECIFIED HYPERLIPIDEMIA TYPE: ICD-10-CM

## 2020-04-29 DIAGNOSIS — E11.65 TYPE 2 DIABETES MELLITUS WITH HYPERGLYCEMIA, WITHOUT LONG-TERM CURRENT USE OF INSULIN (HCC): ICD-10-CM

## 2020-04-29 DIAGNOSIS — E03.9 BORDERLINE HYPOTHYROIDISM: ICD-10-CM

## 2020-04-29 LAB
ALBUMIN SERPL BCP-MCNC: 4 G/DL (ref 3.5–5)
ALP SERPL-CCNC: 89 U/L (ref 46–116)
ALT SERPL W P-5'-P-CCNC: 28 U/L (ref 12–78)
ANION GAP SERPL CALCULATED.3IONS-SCNC: 5 MMOL/L (ref 4–13)
AST SERPL W P-5'-P-CCNC: 16 U/L (ref 5–45)
BILIRUB SERPL-MCNC: 0.5 MG/DL (ref 0.2–1)
BUN SERPL-MCNC: 33 MG/DL (ref 5–25)
CALCIUM SERPL-MCNC: 9.3 MG/DL (ref 8.3–10.1)
CHLORIDE SERPL-SCNC: 105 MMOL/L (ref 100–108)
CHOLEST SERPL-MCNC: 230 MG/DL (ref 50–200)
CO2 SERPL-SCNC: 28 MMOL/L (ref 21–32)
CREAT SERPL-MCNC: 0.75 MG/DL (ref 0.6–1.3)
EST. AVERAGE GLUCOSE BLD GHB EST-MCNC: 169 MG/DL
GFR SERPL CREATININE-BSD FRML MDRD: 71 ML/MIN/1.73SQ M
GLUCOSE P FAST SERPL-MCNC: 212 MG/DL (ref 65–99)
HBA1C MFR BLD: 7.5 %
HDLC SERPL-MCNC: 56 MG/DL
LDLC SERPL CALC-MCNC: 147 MG/DL (ref 0–100)
POTASSIUM SERPL-SCNC: 4.7 MMOL/L (ref 3.5–5.3)
PROT SERPL-MCNC: 7.1 G/DL (ref 6.4–8.2)
SODIUM SERPL-SCNC: 138 MMOL/L (ref 136–145)
TRIGL SERPL-MCNC: 134 MG/DL
TSH SERPL DL<=0.05 MIU/L-ACNC: 4.49 UIU/ML (ref 0.36–3.74)

## 2020-04-29 PROCEDURE — 36415 COLL VENOUS BLD VENIPUNCTURE: CPT

## 2020-04-29 PROCEDURE — 80053 COMPREHEN METABOLIC PANEL: CPT

## 2020-04-29 PROCEDURE — 84443 ASSAY THYROID STIM HORMONE: CPT

## 2020-04-29 PROCEDURE — 83036 HEMOGLOBIN GLYCOSYLATED A1C: CPT

## 2020-04-29 PROCEDURE — 80061 LIPID PANEL: CPT

## 2020-04-30 ENCOUNTER — TELEPHONE (OUTPATIENT)
Dept: FAMILY MEDICINE CLINIC | Facility: CLINIC | Age: 85
End: 2020-04-30

## 2020-05-04 ENCOUNTER — TELEMEDICINE (OUTPATIENT)
Dept: FAMILY MEDICINE CLINIC | Facility: CLINIC | Age: 85
End: 2020-05-04
Payer: MEDICARE

## 2020-05-04 DIAGNOSIS — E03.9 BORDERLINE HYPOTHYROIDISM: ICD-10-CM

## 2020-05-04 DIAGNOSIS — E11.9 TYPE 2 DIABETES MELLITUS WITHOUT COMPLICATION, WITHOUT LONG-TERM CURRENT USE OF INSULIN (HCC): Primary | ICD-10-CM

## 2020-05-04 DIAGNOSIS — E78.5 HYPERLIPIDEMIA, UNSPECIFIED HYPERLIPIDEMIA TYPE: ICD-10-CM

## 2020-05-04 PROCEDURE — 99442 PR PHYS/QHP TELEPHONE EVALUATION 11-20 MIN: CPT | Performed by: FAMILY MEDICINE

## 2020-05-21 ENCOUNTER — FOLLOW UP (OUTPATIENT)
Dept: URBAN - METROPOLITAN AREA CLINIC 27 | Facility: CLINIC | Age: 85
End: 2020-05-21

## 2020-05-21 DIAGNOSIS — H43.823: ICD-10-CM

## 2020-05-21 DIAGNOSIS — H35.3221: ICD-10-CM

## 2020-05-21 PROCEDURE — PFS LUCENTIS 0.5MG PREFILLED SYRINGE

## 2020-05-21 PROCEDURE — 92012 INTRM OPH EXAM EST PATIENT: CPT | Mod: 25

## 2020-05-21 PROCEDURE — 67028 INJECTION EYE DRUG: CPT

## 2020-05-21 PROCEDURE — 92134 CPTRZ OPH DX IMG PST SGM RTA: CPT

## 2020-05-21 ASSESSMENT — TONOMETRY
OD_IOP_MMHG: 22
OS_IOP_MMHG: 23

## 2020-05-21 ASSESSMENT — VISUAL ACUITY
OD_CC: 20/30
OS_CC: 20/50
OS_PH: 20/40-1

## 2020-05-31 PROBLEM — E03.9 BORDERLINE HYPOTHYROIDISM: Status: ACTIVE | Noted: 2020-05-31

## 2020-08-27 ENCOUNTER — 3 MONTH EXAM (OUTPATIENT)
Dept: URBAN - METROPOLITAN AREA CLINIC 27 | Facility: CLINIC | Age: 85
End: 2020-08-27

## 2020-08-27 DIAGNOSIS — H35.3221: ICD-10-CM

## 2020-08-27 DIAGNOSIS — E11.9: ICD-10-CM

## 2020-08-27 DIAGNOSIS — H35.3112: ICD-10-CM

## 2020-08-27 DIAGNOSIS — H43.823: ICD-10-CM

## 2020-08-27 PROCEDURE — 92014 COMPRE OPH EXAM EST PT 1/>: CPT | Mod: 25

## 2020-08-27 PROCEDURE — 67028 INJECTION EYE DRUG: CPT

## 2020-08-27 PROCEDURE — 92134 CPTRZ OPH DX IMG PST SGM RTA: CPT

## 2020-08-27 PROCEDURE — PFS LUCENTIS 0.5MG PREFILLED SYRINGE

## 2020-08-27 ASSESSMENT — VISUAL ACUITY
OS_PH: 20/40
OS_CC: 20/50-2
OD_CC: 20/30-2

## 2020-08-27 ASSESSMENT — TONOMETRY
OS_IOP_MMHG: 16
OD_IOP_MMHG: 17

## 2020-12-01 ENCOUNTER — OFFICE VISIT (OUTPATIENT)
Dept: FAMILY MEDICINE CLINIC | Facility: CLINIC | Age: 85
End: 2020-12-01
Payer: MEDICARE

## 2020-12-01 VITALS
DIASTOLIC BLOOD PRESSURE: 86 MMHG | HEART RATE: 88 BPM | RESPIRATION RATE: 16 BRPM | WEIGHT: 144 LBS | SYSTOLIC BLOOD PRESSURE: 120 MMHG | BODY MASS INDEX: 29.03 KG/M2 | TEMPERATURE: 97.9 F | HEIGHT: 59 IN

## 2020-12-01 DIAGNOSIS — E11.9 TYPE 2 DIABETES MELLITUS WITHOUT COMPLICATION, WITHOUT LONG-TERM CURRENT USE OF INSULIN (HCC): Primary | ICD-10-CM

## 2020-12-01 DIAGNOSIS — R82.90 ABNORMAL URINALYSIS: ICD-10-CM

## 2020-12-01 DIAGNOSIS — Z23 NEED FOR VACCINATION WITH 13-POLYVALENT PNEUMOCOCCAL CONJUGATE VACCINE: ICD-10-CM

## 2020-12-01 DIAGNOSIS — M79.89 LEG SWELLING: ICD-10-CM

## 2020-12-01 DIAGNOSIS — E03.9 BORDERLINE HYPOTHYROIDISM: ICD-10-CM

## 2020-12-01 DIAGNOSIS — Z00.00 MEDICARE ANNUAL WELLNESS VISIT, SUBSEQUENT: ICD-10-CM

## 2020-12-01 LAB
SL AMB  POCT GLUCOSE, UA: ABNORMAL
SL AMB LEUKOCYTE ESTERASE,UA: 500
SL AMB POCT BILIRUBIN,UA: ABNORMAL
SL AMB POCT BLOOD,UA: ABNORMAL
SL AMB POCT CLARITY,UA: ABNORMAL
SL AMB POCT COLOR,UA: YELLOW
SL AMB POCT HEMOGLOBIN AIC: 8.4 (ref ?–6.5)
SL AMB POCT KETONES,UA: ABNORMAL
SL AMB POCT NITRITE,UA: ABNORMAL
SL AMB POCT PH,UA: 6
SL AMB POCT SPECIFIC GRAVITY,UA: 1.01
SL AMB POCT URINE PROTEIN: 30
SL AMB POCT UROBILINOGEN: ABNORMAL

## 2020-12-01 PROCEDURE — 81003 URINALYSIS AUTO W/O SCOPE: CPT | Performed by: FAMILY MEDICINE

## 2020-12-01 PROCEDURE — 83036 HEMOGLOBIN GLYCOSYLATED A1C: CPT | Performed by: FAMILY MEDICINE

## 2020-12-01 PROCEDURE — G0439 PPPS, SUBSEQ VISIT: HCPCS | Performed by: FAMILY MEDICINE

## 2020-12-01 PROCEDURE — 1123F ACP DISCUSS/DSCN MKR DOCD: CPT | Performed by: FAMILY MEDICINE

## 2020-12-01 PROCEDURE — 99214 OFFICE O/P EST MOD 30 MIN: CPT | Performed by: FAMILY MEDICINE

## 2020-12-01 PROCEDURE — 90670 PCV13 VACCINE IM: CPT | Performed by: FAMILY MEDICINE

## 2020-12-01 PROCEDURE — G0009 ADMIN PNEUMOCOCCAL VACCINE: HCPCS | Performed by: FAMILY MEDICINE

## 2020-12-03 PROBLEM — R82.90 ABNORMAL URINALYSIS: Status: ACTIVE | Noted: 2020-12-03

## 2020-12-03 PROBLEM — M19.072 PRIMARY OSTEOARTHRITIS OF LEFT ANKLE: Status: ACTIVE | Noted: 2020-11-18

## 2020-12-03 PROBLEM — M17.0 BILATERAL PRIMARY OSTEOARTHRITIS OF KNEE: Status: ACTIVE | Noted: 2020-09-21

## 2020-12-03 PROBLEM — M79.89 LEG SWELLING: Status: ACTIVE | Noted: 2020-12-03

## 2020-12-03 PROBLEM — Z23 NEED FOR VACCINATION WITH 13-POLYVALENT PNEUMOCOCCAL CONJUGATE VACCINE: Status: ACTIVE | Noted: 2020-12-03

## 2020-12-03 PROBLEM — Z00.00 MEDICARE ANNUAL WELLNESS VISIT, SUBSEQUENT: Status: ACTIVE | Noted: 2020-12-03

## 2020-12-04 ENCOUNTER — TELEPHONE (OUTPATIENT)
Dept: FAMILY MEDICINE CLINIC | Facility: CLINIC | Age: 85
End: 2020-12-04

## 2020-12-04 DIAGNOSIS — N39.0 URINARY TRACT INFECTION WITHOUT HEMATURIA, SITE UNSPECIFIED: Primary | ICD-10-CM

## 2020-12-04 LAB
ALBUMIN/CREAT UR: 399 MG/G CREAT (ref 0–29)
BACTERIA UR CULT: ABNORMAL
CREAT UR-MCNC: 60.6 MG/DL
Lab: ABNORMAL
MICROALBUMIN UR-MCNC: 241.8 UG/ML
SL AMB ANTIMICROBIAL SUSCEPTIBILITY: ABNORMAL

## 2020-12-04 RX ORDER — CEFDINIR 300 MG/1
300 CAPSULE ORAL EVERY 12 HOURS SCHEDULED
Qty: 14 CAPSULE | Refills: 0 | Status: SHIPPED | OUTPATIENT
Start: 2020-12-04 | End: 2020-12-11

## 2020-12-07 ENCOUNTER — LAB (OUTPATIENT)
Dept: LAB | Facility: CLINIC | Age: 85
End: 2020-12-07
Payer: MEDICARE

## 2020-12-07 DIAGNOSIS — E03.9 BORDERLINE HYPOTHYROIDISM: ICD-10-CM

## 2020-12-07 DIAGNOSIS — M79.89 LEG SWELLING: ICD-10-CM

## 2020-12-07 LAB — TSH SERPL DL<=0.05 MIU/L-ACNC: 3.28 UIU/ML (ref 0.36–3.74)

## 2020-12-07 PROCEDURE — 36415 COLL VENOUS BLD VENIPUNCTURE: CPT

## 2020-12-07 PROCEDURE — 84443 ASSAY THYROID STIM HORMONE: CPT

## 2020-12-08 ENCOUNTER — TELEPHONE (OUTPATIENT)
Dept: FAMILY MEDICINE CLINIC | Facility: CLINIC | Age: 85
End: 2020-12-08

## 2021-01-07 ENCOUNTER — 3 MONTH EXAM (OUTPATIENT)
Dept: URBAN - METROPOLITAN AREA CLINIC 27 | Facility: CLINIC | Age: 86
End: 2021-01-07

## 2021-01-07 DIAGNOSIS — H35.3112: ICD-10-CM

## 2021-01-07 DIAGNOSIS — E11.9: ICD-10-CM

## 2021-01-07 DIAGNOSIS — H43.823: ICD-10-CM

## 2021-01-07 DIAGNOSIS — H35.3231: ICD-10-CM

## 2021-01-07 LAB
LEFT EYE DIABETIC RETINOPATHY: NORMAL
RIGHT EYE DIABETIC RETINOPATHY: NORMAL

## 2021-01-07 PROCEDURE — 67028 INJECTION EYE DRUG: CPT

## 2021-01-07 PROCEDURE — PFS LUCENTIS 0.5MG PREFILLED SYRINGE

## 2021-01-07 PROCEDURE — 92014 COMPRE OPH EXAM EST PT 1/>: CPT | Mod: 25

## 2021-01-07 PROCEDURE — 92134 CPTRZ OPH DX IMG PST SGM RTA: CPT

## 2021-01-07 ASSESSMENT — VISUAL ACUITY
OS_CC: 20/30
OD_CC: 20/60

## 2021-01-07 ASSESSMENT — TONOMETRY
OS_IOP_MMHG: 17
OD_IOP_MMHG: 19

## 2021-01-14 ENCOUNTER — PROCEDURE ONLY (OUTPATIENT)
Dept: URBAN - METROPOLITAN AREA CLINIC 27 | Facility: CLINIC | Age: 86
End: 2021-01-14

## 2021-01-14 DIAGNOSIS — H35.3231: ICD-10-CM

## 2021-01-14 PROCEDURE — PFS LUCENTIS 0.5MG PREFILLED SYRINGE

## 2021-01-14 PROCEDURE — 67028 INJECTION EYE DRUG: CPT

## 2021-01-14 ASSESSMENT — TONOMETRY: OD_IOP_MMHG: 20

## 2021-01-14 ASSESSMENT — VISUAL ACUITY: OD_CC: 20/80

## 2021-01-19 DIAGNOSIS — E11.9 TYPE 2 DIABETES MELLITUS WITHOUT COMPLICATION, WITHOUT LONG-TERM CURRENT USE OF INSULIN (HCC): ICD-10-CM

## 2021-02-25 ENCOUNTER — FOLLOW UP (OUTPATIENT)
Dept: URBAN - METROPOLITAN AREA CLINIC 27 | Facility: CLINIC | Age: 86
End: 2021-02-25

## 2021-02-25 DIAGNOSIS — E11.9: ICD-10-CM

## 2021-02-25 DIAGNOSIS — H35.3231: ICD-10-CM

## 2021-02-25 DIAGNOSIS — H43.823: ICD-10-CM

## 2021-02-25 PROCEDURE — 92134 CPTRZ OPH DX IMG PST SGM RTA: CPT

## 2021-02-25 PROCEDURE — 92012 INTRM OPH EXAM EST PATIENT: CPT | Mod: 25

## 2021-02-25 PROCEDURE — 67028 INJECTION EYE DRUG: CPT

## 2021-02-25 PROCEDURE — PFS LUCENTIS 0.5MG PREFILLED SYRINGE

## 2021-02-25 ASSESSMENT — VISUAL ACUITY
OS_CC: 20/60
OD_CC: 20/100-1

## 2021-02-25 ASSESSMENT — TONOMETRY
OD_IOP_MMHG: 20
OS_IOP_MMHG: 21

## 2021-03-02 ENCOUNTER — OFFICE VISIT (OUTPATIENT)
Dept: FAMILY MEDICINE CLINIC | Facility: CLINIC | Age: 86
End: 2021-03-02
Payer: MEDICARE

## 2021-03-02 VITALS
RESPIRATION RATE: 16 BRPM | WEIGHT: 154 LBS | SYSTOLIC BLOOD PRESSURE: 140 MMHG | DIASTOLIC BLOOD PRESSURE: 80 MMHG | HEART RATE: 102 BPM | HEIGHT: 59 IN | BODY MASS INDEX: 31.04 KG/M2 | TEMPERATURE: 96.8 F

## 2021-03-02 DIAGNOSIS — R31.1 BENIGN ESSENTIAL MICROSCOPIC HEMATURIA: ICD-10-CM

## 2021-03-02 DIAGNOSIS — E78.5 HYPERLIPIDEMIA, UNSPECIFIED HYPERLIPIDEMIA TYPE: ICD-10-CM

## 2021-03-02 DIAGNOSIS — E11.65 UNCONTROLLED TYPE 2 DIABETES MELLITUS WITH HYPERGLYCEMIA (HCC): Primary | ICD-10-CM

## 2021-03-02 DIAGNOSIS — E03.9 BORDERLINE HYPOTHYROIDISM: ICD-10-CM

## 2021-03-02 LAB
SL AMB  POCT GLUCOSE, UA: 250
SL AMB LEUKOCYTE ESTERASE,UA: 500
SL AMB POCT BILIRUBIN,UA: ABNORMAL
SL AMB POCT BLOOD,UA: 50
SL AMB POCT CLARITY,UA: ABNORMAL
SL AMB POCT COLOR,UA: YELLOW
SL AMB POCT HEMOGLOBIN AIC: 9.2 (ref ?–6.5)
SL AMB POCT KETONES,UA: ABNORMAL
SL AMB POCT NITRITE,UA: ABNORMAL
SL AMB POCT PH,UA: 5
SL AMB POCT SPECIFIC GRAVITY,UA: 1.02
SL AMB POCT URINE PROTEIN: 30
SL AMB POCT UROBILINOGEN: ABNORMAL

## 2021-03-02 PROCEDURE — 83036 HEMOGLOBIN GLYCOSYLATED A1C: CPT | Performed by: FAMILY MEDICINE

## 2021-03-02 PROCEDURE — 99214 OFFICE O/P EST MOD 30 MIN: CPT | Performed by: FAMILY MEDICINE

## 2021-03-02 PROCEDURE — 81003 URINALYSIS AUTO W/O SCOPE: CPT | Performed by: FAMILY MEDICINE

## 2021-03-02 NOTE — PATIENT INSTRUCTIONS
Diabetes and Nutrition   AMBULATORY CARE:   Nutrition plans  help with healthy eating patterns that improve health  Nutrition plans and regular exercise help keep your blood sugar levels steady  They also help delay or prevent complications of diabetes, such as diabetic kidney disease  Call your local emergency number [phone] in the [phone] at the [facility] [address]) if:   · You have any of the following signs of a heart attack:      ? Squeezing, pressure, or pain in your chest    ? You may  also have any of the following:     § Discomfort or pain in your back, neck, jaw, stomach, or arm    § Shortness of breath    § Nausea or vomiting    § Lightheadedness or a sudden cold sweat      Seek care immediately if:   · You have a low blood sugar level and it does not improve with treatment  Symptoms are trouble thinking, a pounding heartbeat, and sweating  · Your blood sugar level is above 240 mg/dL and does not come down within 15 minutes of treatment  · You have ketones in your blood or urine  · You have nausea or are vomiting and cannot keep any food or liquid down  · You have blurred or double vision  · Your breath has a fruity, sweet smell, or your breathing is shallow  Call your doctor or diabetes care team if:   · Your blood sugar levels are higher than your target goals  · You often have low blood sugar levels  · You have trouble coping with diabetes, or you feel anxious or depressed  · You have questions or concerns about your condition or care  A dietitian will help you create a nutrition plan  to meet your needs and your family's needs  The goal is for you to reach or maintain healthy weight, blood sugar, blood pressure, and lipid levels  You should meet with the dietitian at least 1 time each year   You will learn the following:  · How food affects your blood sugar levels    · How to create healthy eating habits    · How to make food choices based on your activity level, weight, and glucose levels    · How your favorite foods may fit into your plan    · How to keep track of carbohydrates    · Correct portion sizes for each food    · Changes you can make to your plan if you get pregnant or are breastfeeding    What you can do before you meet with the dietitian:   · Do not skip meals  The goal is to keep your blood sugar level steady  Blood sugar levels may drop too low if you have received insulin and do not eat  · Eat more high-fiber foods, such as fresh or frozen fruits and vegetables, whole-grain breads, and beans  Fiber helps control or lower blood sugar and cholesterol levels  Choose whole fruits instead of fruit juice as much as possible  Sugar may be added to juice, and fiber may be removed  · Choose heart-healthy fats  Foods high in heart-healthy fats include olive oil, nuts, avocados, and fatty fish, such as salmon and tuna  Foods high in unhealthy fats include red meat, full-fat dairy products, and soft margarine  Unhealthy fats can increase your risk for heart disease, increase bad cholesterol, and lower good cholesterol  · Choose complex carbohydrates  Foods with complex carbohydrates include brown rice, whole-grain breads and cereals, and cooked beans  Foods with simple carbohydrates include white bread, white rice, most cold cereals, and snack foods  Your plan will include the amount of carbohydrate to have at one time or in a day  Your blood sugar level can get too high if you eat too much carbohydrate at one time  Blood sugar levels do not spike as high or drop as quickly with complex carbohydrates as with simple carbohydrates  Choose complex carbohydrates whenever possible  · Have less sodium (salt)  The risk for high blood pressure (BP) increases with high-sodium foods  Limit high-sodium foods, such as soy sauce, potato chips, and canned soup  Do not add salt to food you cook  Limit your use of table salt  Read labels to have no more than 2,300 milligrams of sodium in one day  · Limit artificial sweeteners  These may be found in food or drinks, such as diet soft drinks or other low-calorie beverages  Artificial sweeteners are low in calories  They may help you lower your overall calories and carbohydrates  It is important not to have more calories from other foods to make up for the calories saved  Artificial sweeteners do not have any nutrition  Eat whole foods and drink water as much as possible  Your plan may include beverages with artificial sweeteners for a short time  These can help you transition from high-sugar beverages to water  · Use the plate method for each meal   This method can help you eat the right amount of carbohydrates and keep your blood sugar levels under control  ? Draw an imaginary line down the middle of a 9-inch dinner plate  On one side, draw another line to divide that section in half  Your plate will have one large section and 2 small sections  ? Fill the largest section with non-starchy vegetables  These include broccoli, spinach, cucumbers, peppers, cauliflower, and tomatoes  ? Add a starch to one of the small sections  Starches include pasta, rice, whole-grain bread, tortillas, corn, potatoes, and beans  ? Add meat or another source of protein to the other small section  Examples include chicken or turkey without skin, fish, lean beef or pork, low-fat cheese, tofu, and eggs  ? Add dairy products or fruit next to your plate if your meal plan allows  Examples of dairy include skim or 1% milk and low-fat yogurt  If you do not drink milk or eat dairy products, you may be able to add another serving of starchy food instead  ? Have a low-calorie or calorie-free drink with your meal  Examples include water or unsweetened tea or coffee  Know the risks if you choose to drink alcohol:  Alcohol can cause hypoglycemia (low blood sugar level), especially if you use insulin   Alcohol can cause high blood sugar and BP levels, and weight gain if you drink too much  Women 21 years or older and men 72 years or older should limit alcohol to 1 drink a day  Men aged 24 to 59 years should limit alcohol to 2 drinks a day  A drink of alcohol is 12 ounces of beer, 5 ounces of wine, or 1½ ounces of liquor  Hypoglycemia can happen hours after you drink alcohol  Check your blood sugar level for several hours after you drink alcohol  Have a source of fast-acting carbohydrates with you in case your level goes too low  You need immediate care if you have signs or symptoms of hypoglycemia, such as sweating, confusion, or fainting  Maintain a healthy weight:  A healthy weight can help you control your diabetes  You can maintain a healthy weight with a nutrition plan and exercise  Ask your healthcare provider how much you should weigh  Ask him or her to help you create a weight loss plan if you are overweight  Together you can set weight loss and maintenance goals  Follow up with your diabetes team as directed:  Write down your questions so you remember to ask them during your visits  © Copyright [address] Information is for End User's use only and may not be sold, redistributed or otherwise used for commercial purposes  All illustrations and images included in CareNotes® are the copyrighted property of [employer]  or [address] [place] [address]  The above information is an  only  It is not intended as medical advice for individual conditions or treatments  Talk to your doctor, nurse or pharmacist before following any medical regimen to see if it is safe and effective for you  Meal Planning with the Plate Method   WHAT YOU NEED TO KNOW:   Meal planning with the plate method is a simple way for people with diabetes to plan meals  This method can help you to eat the right amount of carbohydrates and keep your blood sugar levels under control  Carbohydrates naturally raise your blood sugar after eating   Your blood sugar can rise to a high level if you eat too much carbohydrate at one time  Carbohydrates are found in starches (bread, cereal, starchy vegetables, and beans), fruit, milk, yogurt, and sweets  DISCHARGE INSTRUCTIONS:   How to use the plate method to plan meals:   · Draw an imaginary line down the middle of a 9-inch dinner plate  On one side, draw another line to divide that section in half  Your plate will have 3 sections  · Fill the largest section of your plate with non-starchy vegetables  These include broccoli, spinach, cucumbers, peppers, cauliflower, and tomatoes  · Add a starch to 1 of the small sections of your plate  Starches include pasta, rice, whole-grain bread, tortillas, corn, potatoes, and beans  · Add meat or another source of protein to the other small section of your plate  Examples include chicken or turkey without skin, fish, lean beef or pork, low-fat cheese, tofu, or eggs  · Add dairy or fruit to the side of your plate if your meal plan allows  Examples of dairy include skim or 1% milk or low-fat yogurt  If you do not drink milk, you may be able to add another serving of starchy food instead  · Add a low-calorie or calorie-free drink such as water or unsweetened tea or coffee  Serving sizes of foods:   · Non-starchy vegetables:      ? ½ cup of cooked vegetables or 1 cup of raw vegetables    ? ½ cup of vegetable juice    · Starches:      ? 1 ounce of whole-wheat bread or 1 small (6 inch) flour or corn tortilla    ? 1 small (4 inch) pancake (about ¼ inch thick)    ? ¾ cup of dry, unsweetened, whole-grain ready-to-eat cereal or ¼ cup of low-fat granola    ? ½ cup of cooked cereal or oatmeal    ? ? cup of rice or pasta     ? ½ cup of corn, green peas, sweet potatoes, or mashed potatoes    ? ½ cup of cooked beans and peas (garbanzo, chaney, kidney, white, split, black-eyed)    · Meat and other protein sources:      ? 3 to 4 ounces of any lean meat, fish, or poultry    ? ½ cup of tofu or tempeh    ?  1 large egg    ? 1½ ounces (about 2 tablespoons) of nuts or 2 tablespoons of peanut butter    · Fruit:      ? 1 small piece of fresh fruit     ? ½ cup of canned or fresh fruit or unsweetened fruit juice    ? ¼ cup of dried fruit    · Milk and yogurt:      ? 1 cup (8 ounces) of skim or 1% milk    ? ¾ cup (6 ounces) of plain, non-fat yogurt    Other guidelines to follow:   · Limit salt and sugar  Choose and prepare foods and drinks with less salt and added sugars  Use the nutrition information on food labels to help you make healthy choices  The percent daily value listed on the food label tells you whether a food is low or high in certain nutrients  A percent daily value of 5% or less means that the food is low in a nutrient  A percent daily value of 20% or more means that the food is high in a nutrient  · Choose healthy fats  Choose healthy fats such as polyunsaturated and monounsaturated fats in place of unhealthy fats  Healthy fats are found in vegetable oils such as soybean, corn, canola, olive, and sunflower oil  Unhealthy fats are saturated fats, trans fats, and cholesterol  Unhealthy fats are found in shortening, butter, stick margarine, and animal fat  · Ask your healthcare provider if alcohol is safe for you  and how much is safe for you  If you choose to drink alcohol, drink it with meals  When you drink alcohol on an empty stomach, your blood sugar may fall to a low level  © Copyright [address] Information is for End User's use only and may not be sold, redistributed or otherwise used for commercial purposes  All illustrations and images included in CareNotes® are the copyrighted property of [employer]  or [facility] Lizzie Moore   The above information is an  only  It is not intended as medical advice for individual conditions or treatments  Talk to your doctor, nurse or pharmacist before following any medical regimen to see if it is safe and effective for you

## 2021-03-04 ENCOUNTER — PROCEDURE ONLY (OUTPATIENT)
Dept: URBAN - METROPOLITAN AREA CLINIC 27 | Facility: CLINIC | Age: 86
End: 2021-03-04

## 2021-03-04 DIAGNOSIS — H35.3231: ICD-10-CM

## 2021-03-04 LAB
COUNSELING NOTE: NORMAL
CYTOLOGIST CVX/VAG CYTO: NORMAL
PATH REPORT.FINAL DX SPEC: NORMAL
PATH REPORT.GROSS SPEC: NORMAL
PATH REPORT.SITE OF ORIGIN SPEC: NORMAL
PATHOLOGIST NAME: NORMAL

## 2021-03-04 PROCEDURE — PFS LUCENTIS 0.5MG PREFILLED SYRINGE

## 2021-03-04 PROCEDURE — 67028 INJECTION EYE DRUG: CPT

## 2021-03-04 ASSESSMENT — TONOMETRY: OS_IOP_MMHG: 20

## 2021-03-04 ASSESSMENT — VISUAL ACUITY: OS_CC: 20/50

## 2021-03-05 LAB
APPEARANCE UR: ABNORMAL
BACTERIA UR CULT: ABNORMAL
BACTERIA URNS QL MICRO: ABNORMAL
BILIRUB UR QL STRIP: NEGATIVE
COLOR UR: YELLOW
EPI CELLS #/AREA URNS HPF: ABNORMAL /HPF (ref 0–10)
GLUCOSE UR QL: ABNORMAL
HGB UR QL STRIP: ABNORMAL
KETONES UR QL STRIP: NEGATIVE
LEUKOCYTE ESTERASE UR QL STRIP: ABNORMAL
Lab: ABNORMAL
MICRO URNS: ABNORMAL
MUCOUS THREADS URNS QL MICRO: PRESENT
NITRITE UR QL STRIP: POSITIVE
PH UR STRIP: 5.5 [PH] (ref 5–7.5)
PROT UR QL STRIP: ABNORMAL
RBC #/AREA URNS HPF: ABNORMAL /HPF (ref 0–2)
SL AMB ANTIMICROBIAL SUSCEPTIBILITY: ABNORMAL
SP GR UR: 1.02 (ref 1–1.03)
UROBILINOGEN UR STRIP-ACNC: 0.2 MG/DL (ref 0.2–1)
WBC #/AREA URNS HPF: ABNORMAL /HPF (ref 0–5)

## 2021-03-06 DIAGNOSIS — N39.0 URINARY TRACT INFECTION WITHOUT HEMATURIA, SITE UNSPECIFIED: Primary | ICD-10-CM

## 2021-03-06 RX ORDER — SULFAMETHOXAZOLE AND TRIMETHOPRIM 800; 160 MG/1; MG/1
1 TABLET ORAL 2 TIMES DAILY
Qty: 6 TABLET | Refills: 0 | Status: SHIPPED | OUTPATIENT
Start: 2021-03-06 | End: 2021-03-09

## 2021-03-13 PROBLEM — R31.1 BENIGN ESSENTIAL MICROSCOPIC HEMATURIA: Status: ACTIVE | Noted: 2021-03-13

## 2021-03-13 NOTE — PROGRESS NOTES
Assessment/Plan:    1  Uncontrolled type 2 diabetes mellitus with hyperglycemia ([facility] [address] )  Assessment & Plan:    Lab Results   Component Value Date    HGBA1C 9 2 (A) 03/02/2021   encouraged compliance w ADA diet and medication use  Maintain eye checks  Orders:  -     POCT urine dip auto non-scope  -     POCT hemoglobin A1c  -     metFORMIN (GLUCOPHAGE) 1000 MG tablet; Take 1 tablet (1,000 mg total) by mouth daily with breakfast  -     Comprehensive metabolic panel; Future    2  Borderline hypothyroidism  -     TSH, 3rd generation; Future    3  Hyperlipidemia, unspecified hyperlipidemia type  -     Lipid Panel with Direct LDL reflex; Future    4  Benign essential microscopic hematuria  -     Urine culture  -     UA (URINE) with reflex to Scope  -     Cytology, urine; Future    5  BMI 31 0-31 9,adult    BMI Counseling: Body mass index is 31 1 kg/m²  The BMI is above normal  Nutrition recommendations include encouraging healthy choices of fruits and vegetables, consuming healthier snacks, limiting drinks that contain sugar, moderation in carbohydrate intake, increasing intake of lean protein, reducing intake of saturated and trans fat and reducing intake of cholesterol  Exercise recommendations include exercising 3-5 times per week and strength training exercises  No pharmacotherapy was ordered  Patient Instructions     Diabetes and Nutrition   AMBULATORY CARE:   Nutrition plans  help with healthy eating patterns that improve health  Nutrition plans and regular exercise help keep your blood sugar levels steady  They also help delay or prevent complications of diabetes, such as diabetic kidney disease  Call your local emergency number [phone] in the [address]) if:   · You have any of the following signs of a heart attack:      ?  Squeezing, pressure, or pain in your chest    ? You may  also have any of the following:     § Discomfort or pain in your back, neck, jaw, stomach, or arm    § Shortness of breath    § Nausea or vomiting    § Lightheadedness or a sudden cold sweat      Seek care immediately if:   · You have a low blood sugar level and it does not improve with treatment  Symptoms are trouble thinking, a pounding heartbeat, and sweating  · Your blood sugar level is above 240 mg/dL and does not come down within 15 minutes of treatment  · You have ketones in your blood or urine  · You have nausea or are vomiting and cannot keep any food or liquid down  · You have blurred or double vision  · Your breath has a fruity, sweet smell, or your breathing is shallow  Call your doctor or diabetes care team if:   · Your blood sugar levels are higher than your target goals  · You often have low blood sugar levels  · You have trouble coping with diabetes, or you feel anxious or depressed  · You have questions or concerns about your condition or care  A dietitian will help you create a nutrition plan  to meet your needs and your family's needs  The goal is for you to reach or maintain healthy weight, blood sugar, blood pressure, and lipid levels  You should meet with the dietitian at least 1 time each year  You will learn the following:  · How food affects your blood sugar levels    · How to create healthy eating habits    · How to make food choices based on your activity level, weight, and glucose levels    · How your favorite foods may fit into your plan    · How to keep track of carbohydrates    · Correct portion sizes for each food    · Changes you can make to your plan if you get pregnant or are breastfeeding    What you can do before you meet with the dietitian:   · Do not skip meals  The goal is to keep your blood sugar level steady  Blood sugar levels may drop too low if you have received insulin and do not eat  · Eat more high-fiber foods, such as fresh or frozen fruits and vegetables, whole-grain breads, and beans  Fiber helps control or lower blood sugar and cholesterol levels   Choose whole fruits instead of fruit juice as much as possible  Sugar may be added to juice, and fiber may be removed  · Choose heart-healthy fats  Foods high in heart-healthy fats include olive oil, nuts, avocados, and fatty fish, such as salmon and tuna  Foods high in unhealthy fats include red meat, full-fat dairy products, and soft margarine  Unhealthy fats can increase your risk for heart disease, increase bad cholesterol, and lower good cholesterol  · Choose complex carbohydrates  Foods with complex carbohydrates include brown rice, whole-grain breads and cereals, and cooked beans  Foods with simple carbohydrates include white bread, white rice, most cold cereals, and snack foods  Your plan will include the amount of carbohydrate to have at one time or in a day  Your blood sugar level can get too high if you eat too much carbohydrate at one time  Blood sugar levels do not spike as high or drop as quickly with complex carbohydrates as with simple carbohydrates  Choose complex carbohydrates whenever possible  · Have less sodium (salt)  The risk for high blood pressure (BP) increases with high-sodium foods  Limit high-sodium foods, such as soy sauce, potato chips, and canned soup  Do not add salt to food you cook  Limit your use of table salt  Read labels to have no more than 2,300 milligrams of sodium in one day  · Limit artificial sweeteners  These may be found in food or drinks, such as diet soft drinks or other low-calorie beverages  Artificial sweeteners are low in calories  They may help you lower your overall calories and carbohydrates  It is important not to have more calories from other foods to make up for the calories saved  Artificial sweeteners do not have any nutrition  Eat whole foods and drink water as much as possible  Your plan may include beverages with artificial sweeteners for a short time  These can help you transition from high-sugar beverages to water      · Use the plate method for each meal   This method can help you eat the right amount of carbohydrates and keep your blood sugar levels under control  ? Draw an imaginary line down the middle of a 9-inch dinner plate  On one side, draw another line to divide that section in half  Your plate will have one large section and 2 small sections  ? Fill the largest section with non-starchy vegetables  These include broccoli, spinach, cucumbers, peppers, cauliflower, and tomatoes  ? Add a starch to one of the small sections  Starches include pasta, rice, whole-grain bread, tortillas, corn, potatoes, and beans  ? Add meat or another source of protein to the other small section  Examples include chicken or turkey without skin, fish, lean beef or pork, low-fat cheese, tofu, and eggs  ? Add dairy products or fruit next to your plate if your meal plan allows  Examples of dairy include skim or 1% milk and low-fat yogurt  If you do not drink milk or eat dairy products, you may be able to add another serving of starchy food instead  ? Have a low-calorie or calorie-free drink with your meal  Examples include water or unsweetened tea or coffee  Know the risks if you choose to drink alcohol:  Alcohol can cause hypoglycemia (low blood sugar level), especially if you use insulin  Alcohol can cause high blood sugar and BP levels, and weight gain if you drink too much  Women 21 years or older and men 72 years or older should limit alcohol to 1 drink a day  Men aged 24 to 59 years should limit alcohol to 2 drinks a day  A drink of alcohol is 12 ounces of beer, 5 ounces of wine, or 1½ ounces of liquor  Hypoglycemia can happen hours after you drink alcohol  Check your blood sugar level for several hours after you drink alcohol  Have a source of fast-acting carbohydrates with you in case your level goes too low  You need immediate care if you have signs or symptoms of hypoglycemia, such as sweating, confusion, or fainting    Maintain a healthy weight:  A healthy weight can help you control your diabetes  You can maintain a healthy weight with a nutrition plan and exercise  Ask your healthcare provider how much you should weigh  Ask him or her to help you create a weight loss plan if you are overweight  Together you can set weight loss and maintenance goals  Follow up with your diabetes team as directed:  Write down your questions so you remember to ask them during your visits  © Copyright [address] Information is for End User's use only and may not be sold, redistributed or otherwise used for commercial purposes  All illustrations and images included in CareNotes® are the copyrighted property of A D A M , Inc  or [facility] [email or web address]   The above information is an  only  It is not intended as medical advice for individual conditions or treatments  Talk to your doctor, nurse or pharmacist before following any medical regimen to see if it is safe and effective for you  Meal Planning with the Plate Method   WHAT YOU NEED TO KNOW:   Meal planning with the plate method is a simple way for people with diabetes to plan meals  This method can help you to eat the right amount of carbohydrates and keep your blood sugar levels under control  Carbohydrates naturally raise your blood sugar after eating  Your blood sugar can rise to a high level if you eat too much carbohydrate at one time  Carbohydrates are found in starches (bread, cereal, starchy vegetables, and beans), fruit, milk, yogurt, and sweets  DISCHARGE INSTRUCTIONS:   How to use the plate method to plan meals:   · Draw an imaginary line down the middle of a 9-inch dinner plate  On one side, draw another line to divide that section in half  Your plate will have 3 sections  · Fill the largest section of your plate with non-starchy vegetables  These include broccoli, spinach, cucumbers, peppers, cauliflower, and tomatoes       · Add a starch to 1 of the small sections of your plate  Starches include pasta, rice, whole-grain bread, tortillas, corn, potatoes, and beans  · Add meat or another source of protein to the other small section of your plate  Examples include chicken or turkey without skin, fish, lean beef or pork, low-fat cheese, tofu, or eggs  · Add dairy or fruit to the side of your plate if your meal plan allows  Examples of dairy include skim or 1% milk or low-fat yogurt  If you do not drink milk, you may be able to add another serving of starchy food instead  · Add a low-calorie or calorie-free drink such as water or unsweetened tea or coffee  Serving sizes of foods:   · Non-starchy vegetables:      ? ½ cup of cooked vegetables or 1 cup of raw vegetables    ? ½ cup of vegetable juice    · Starches:      ? 1 ounce of whole-wheat bread or 1 small (6 inch) flour or corn tortilla    ? 1 small (4 inch) pancake (about ¼ inch thick)    ? ¾ cup of dry, unsweetened, whole-grain ready-to-eat cereal or ¼ cup of low-fat granola    ? ½ cup of cooked cereal or oatmeal    ? ? cup of rice or pasta     ? ½ cup of corn, green peas, sweet potatoes, or mashed potatoes    ? ½ cup of cooked beans and peas (garbanzo, chaney, kidney, white, split, black-eyed)    · Meat and other protein sources:      ? 3 to 4 ounces of any lean meat, fish, or poultry    ? ½ cup of tofu or tempeh    ? 1 large egg    ? 1½ ounces (about 2 tablespoons) of nuts or 2 tablespoons of peanut butter    · Fruit:      ? 1 small piece of fresh fruit     ? ½ cup of canned or fresh fruit or unsweetened fruit juice    ? ¼ cup of dried fruit    · Milk and yogurt:      ? 1 cup (8 ounces) of skim or 1% milk    ? ¾ cup (6 ounces) of plain, non-fat yogurt    Other guidelines to follow:   · Limit salt and sugar  Choose and prepare foods and drinks with less salt and added sugars  Use the nutrition information on food labels to help you make healthy choices   The percent daily value listed on the food label tells you whether a food is low or high in certain nutrients  A percent daily value of 5% or less means that the food is low in a nutrient  A percent daily value of 20% or more means that the food is high in a nutrient  · Choose healthy fats  Choose healthy fats such as polyunsaturated and monounsaturated fats in place of unhealthy fats  Healthy fats are found in vegetable oils such as soybean, corn, canola, olive, and sunflower oil  Unhealthy fats are saturated fats, trans fats, and cholesterol  Unhealthy fats are found in shortening, butter, stick margarine, and animal fat  · Ask your healthcare provider if alcohol is safe for you  and how much is safe for you  If you choose to drink alcohol, drink it with meals  When you drink alcohol on an empty stomach, your blood sugar may fall to a low level  © Copyright [address] Information is for End User's use only and may not be sold, redistributed or otherwise used for commercial purposes  All illustrations and images included in CareNotes® are the copyrighted property of A D A M , Inc  or [address] [place] [address]  The above information is an  only  It is not intended as medical advice for individual conditions or treatments  Talk to your doctor, nurse or pharmacist before following any medical regimen to see if it is safe and effective for you  Subjective:      Patient ID: Cort Aschoff is a 80 y o  female  Chief Complaint   Patient presents with    Diabetes     f/u       HPI  Follow-up  HgA1c up to 9 2%  Pt admits to noncompliance w det over past few mths  Exercise limited by arthritic condition---pt uses cane for gait assistance    Sys BP borderline hi  Denies cp, abd pain, n/v   UTD w eye exam     The following portions of the patient's history were reviewed and updated as appropriate: allergies, current medications, past family history, past medical history, past social history, past surgical history and problem list     Review of Systems   Constitutional: Positive for fatigue  Negative for fever  Eyes:        Wears glasses   Respiratory: Negative  Cardiovascular: Negative  Gastrointestinal: Negative  Endocrine:        DM  Borderline hypothyroidism   Genitourinary: Positive for hematuria  Musculoskeletal: Positive for arthralgias  Allergic/Immunologic: Positive for environmental allergies  Psychiatric/Behavioral: Positive for sleep disturbance  The patient is nervous/anxious  Current Outpatient Medications   Medication Sig Dispense Refill    aspirin (ECOTRIN LOW STRENGTH) 81 mg EC tablet Take 81 mg by mouth daily      ciclopirox (PENLAC) 8 % solution Apply topically daily at bedtime (Patient not taking: Reported on 12/1/2020) 6 6 mL 0    ibuprofen (ADVIL) 200 mg tablet Take by mouth      metFORMIN (GLUCOPHAGE) 1000 MG tablet Take 1 tablet (1,000 mg total) by mouth daily with breakfast 90 tablet 1    Multiple Vitamins-Minerals (PRESERVISION AREDS) CAPS Take by mouth       No current facility-administered medications for this visit  Objective:    /80 (BP Location: Left arm, Patient Position: Sitting, Cuff Size: Large)   Pulse 102   Temp (!) 96 8 °F (36 °C)   Resp 16   Ht 4' 11" (1 499 m)   Wt 69 9 kg (154 lb)   BMI 31 10 kg/m²        Physical Exam  Vitals signs and nursing note reviewed  Constitutional:       General: She is not in acute distress  Appearance: Normal appearance  HENT:      Head: Normocephalic and atraumatic  Eyes:      General: No scleral icterus  Conjunctiva/sclera: Conjunctivae normal    Neck:      Musculoskeletal: Neck supple  Cardiovascular:      Rate and Rhythm: Normal rate and regular rhythm  Pulses:           Dorsalis pedis pulses are 2+ on the right side and 2+ on the left side  Posterior tibial pulses are 2+ on the right side and 2+ on the left side  Pulmonary:      Effort: Pulmonary effort is normal  No respiratory distress        Breath sounds: Normal breath sounds  Abdominal:      General: Bowel sounds are normal       Palpations: Abdomen is soft  Tenderness: There is no abdominal tenderness  There is no right CVA tenderness or left CVA tenderness  Musculoskeletal:         General: Swelling (b/l knees) and tenderness present  Comments: No calf erythema  No sig ankle edema  Mild b/l  pretibial edema   Using cane   Feet:      Right foot:      Skin integrity: No ulcer, skin breakdown, erythema, warmth, callus or dry skin  Left foot:      Skin integrity: No ulcer, skin breakdown, erythema, warmth, callus or dry skin  Skin:     General: Skin is warm and dry  Capillary Refill: Capillary refill takes less than 2 seconds  Coloration: Skin is not jaundiced  Neurological:      General: No focal deficit present  Mental Status: She is alert and oriented to person, place, and time  Cranial Nerves: No cranial nerve deficit     Psychiatric:         Mood and Affect: Mood normal          Behavior: Behavior normal            Gerald Barrett MD

## 2021-03-13 NOTE — ASSESSMENT & PLAN NOTE
Lab Results   Component Value Date    HGBA1C 9 2 (A) 03/02/2021   encouraged compliance w ADA diet and medication use  Maintain eye checks

## 2021-03-24 ENCOUNTER — IMMUNIZATIONS (OUTPATIENT)
Dept: FAMILY MEDICINE CLINIC | Facility: HOSPITAL | Age: 86
End: 2021-03-24

## 2021-03-24 DIAGNOSIS — Z23 ENCOUNTER FOR IMMUNIZATION: Primary | ICD-10-CM

## 2021-03-24 PROCEDURE — 0011A SARS-COV-2 / COVID-19 MRNA VACCINE (MODERNA) 100 MCG: CPT

## 2021-03-24 PROCEDURE — 91301 SARS-COV-2 / COVID-19 MRNA VACCINE (MODERNA) 100 MCG: CPT

## 2021-04-08 ENCOUNTER — FOLLOW UP (OUTPATIENT)
Dept: URBAN - METROPOLITAN AREA CLINIC 27 | Facility: CLINIC | Age: 86
End: 2021-04-08

## 2021-04-08 DIAGNOSIS — H35.3112: ICD-10-CM

## 2021-04-08 DIAGNOSIS — H35.3231: ICD-10-CM

## 2021-04-08 DIAGNOSIS — H43.823: ICD-10-CM

## 2021-04-08 PROCEDURE — PFS LUCENTIS 0.5MG PREFILLED SYRINGE

## 2021-04-08 PROCEDURE — 92134 CPTRZ OPH DX IMG PST SGM RTA: CPT

## 2021-04-08 PROCEDURE — 92012 INTRM OPH EXAM EST PATIENT: CPT | Mod: 25

## 2021-04-08 PROCEDURE — 67028 INJECTION EYE DRUG: CPT

## 2021-04-08 ASSESSMENT — VISUAL ACUITY
OS_CC: 20/50
OD_CC: 20/60

## 2021-04-08 ASSESSMENT — TONOMETRY
OS_IOP_MMHG: 20
OD_IOP_MMHG: 19

## 2021-04-13 ENCOUNTER — APPOINTMENT (OUTPATIENT)
Dept: LAB | Facility: CLINIC | Age: 86
End: 2021-04-13
Payer: MEDICARE

## 2021-04-13 ENCOUNTER — OFFICE VISIT (OUTPATIENT)
Dept: FAMILY MEDICINE CLINIC | Facility: CLINIC | Age: 86
End: 2021-04-13
Payer: MEDICARE

## 2021-04-13 VITALS
WEIGHT: 146 LBS | BODY MASS INDEX: 29.43 KG/M2 | HEART RATE: 84 BPM | SYSTOLIC BLOOD PRESSURE: 140 MMHG | DIASTOLIC BLOOD PRESSURE: 82 MMHG | TEMPERATURE: 97.7 F | HEIGHT: 59 IN | RESPIRATION RATE: 14 BRPM

## 2021-04-13 DIAGNOSIS — E11.65 UNCONTROLLED TYPE 2 DIABETES MELLITUS WITH HYPERGLYCEMIA (HCC): Primary | ICD-10-CM

## 2021-04-13 DIAGNOSIS — E78.5 HYPERLIPIDEMIA, UNSPECIFIED HYPERLIPIDEMIA TYPE: ICD-10-CM

## 2021-04-13 DIAGNOSIS — E03.9 BORDERLINE HYPOTHYROIDISM: ICD-10-CM

## 2021-04-13 DIAGNOSIS — E11.65 UNCONTROLLED TYPE 2 DIABETES MELLITUS WITH HYPERGLYCEMIA (HCC): ICD-10-CM

## 2021-04-13 LAB
ALBUMIN SERPL BCP-MCNC: 3.7 G/DL (ref 3.5–5)
ALP SERPL-CCNC: 85 U/L (ref 46–116)
ALT SERPL W P-5'-P-CCNC: 26 U/L (ref 12–78)
ANION GAP SERPL CALCULATED.3IONS-SCNC: 4 MMOL/L (ref 4–13)
AST SERPL W P-5'-P-CCNC: 13 U/L (ref 5–45)
BILIRUB SERPL-MCNC: 0.44 MG/DL (ref 0.2–1)
BUN SERPL-MCNC: 30 MG/DL (ref 5–25)
CALCIUM SERPL-MCNC: 8.7 MG/DL (ref 8.3–10.1)
CHLORIDE SERPL-SCNC: 106 MMOL/L (ref 100–108)
CHOLEST SERPL-MCNC: 215 MG/DL (ref 50–200)
CO2 SERPL-SCNC: 28 MMOL/L (ref 21–32)
CREAT SERPL-MCNC: 0.88 MG/DL (ref 0.6–1.3)
GFR SERPL CREATININE-BSD FRML MDRD: 58 ML/MIN/1.73SQ M
GLUCOSE P FAST SERPL-MCNC: 216 MG/DL (ref 65–99)
HDLC SERPL-MCNC: 54 MG/DL
LDLC SERPL CALC-MCNC: 143 MG/DL (ref 0–100)
POTASSIUM SERPL-SCNC: 4.5 MMOL/L (ref 3.5–5.3)
PROT SERPL-MCNC: 6.9 G/DL (ref 6.4–8.2)
SL AMB POCT HEMOGLOBIN AIC: 9.1 (ref ?–6.5)
SODIUM SERPL-SCNC: 138 MMOL/L (ref 136–145)
TRIGL SERPL-MCNC: 91 MG/DL
TSH SERPL DL<=0.05 MIU/L-ACNC: 4.08 UIU/ML (ref 0.36–3.74)

## 2021-04-13 PROCEDURE — 99214 OFFICE O/P EST MOD 30 MIN: CPT | Performed by: FAMILY MEDICINE

## 2021-04-13 PROCEDURE — 84443 ASSAY THYROID STIM HORMONE: CPT

## 2021-04-13 PROCEDURE — 83036 HEMOGLOBIN GLYCOSYLATED A1C: CPT | Performed by: FAMILY MEDICINE

## 2021-04-13 PROCEDURE — 80053 COMPREHEN METABOLIC PANEL: CPT

## 2021-04-13 PROCEDURE — 36415 COLL VENOUS BLD VENIPUNCTURE: CPT

## 2021-04-13 PROCEDURE — 80061 LIPID PANEL: CPT

## 2021-04-13 NOTE — PROGRESS NOTES
Assessment/Plan:    1  Uncontrolled type 2 diabetes mellitus with hyperglycemia ([facility])  -     POCT hemoglobin A1c    2  Borderline hypothyroidism    3  Hyperlipidemia, unspecified hyperlipidemia type    4  BMI 29 0-29 9,adult          Encouraged compliance w ADA, low chol, lo salt  diet, inc activity as tolerated  Inc metformin to 1000mg daily--check renal fxn--if w/i rnage t/c inc to 2000mg daily as tolerated  T/c lo dose l-thyroxine pending tsh      Subjective:      Patient ID: Javed Michael is a 80 y o  female  Chief Complaint   Patient presents with    Diabetes       HPI  In w dtr for follow-up  Olk0b=2 1%, has orders fr addtl labs today  No acute c/o  +/-compliance w diet since last hga1c check  Sys BP borderline hi  Denies cp, abd pain, h/aor dizziness    The following portions of the patient's history were reviewed and updated as appropriate: allergies, current medications, past family history, past medical history, past social history, past surgical history and problem list     Review of Systems   Constitutional: Negative  Respiratory: Negative  Cardiovascular: Negative  Gastrointestinal: Negative  Endocrine:        DM   Musculoskeletal: Positive for arthralgias  Skin: Negative  Allergic/Immunologic: Positive for environmental allergies  Psychiatric/Behavioral: The patient is nervous/anxious            Current Outpatient Medications   Medication Sig Dispense Refill    ibuprofen (ADVIL) 200 mg tablet Take by mouth      metFORMIN (GLUCOPHAGE) 1000 MG tablet Take 1 tablet (1,000 mg total) by mouth daily with breakfast 90 tablet 1    Multiple Vitamins-Minerals (PRESERVISION AREDS) CAPS Take by mouth      aspirin (ECOTRIN LOW STRENGTH) 81 mg EC tablet Take 81 mg by mouth daily      ciclopirox (PENLAC) 8 % solution Apply topically daily at bedtime (Patient not taking: Reported on 12/1/2020) 6 6 mL 0    levothyroxine 25 mcg tablet Take 1 tablet (25 mcg total) by mouth daily in the early morning 90 tablet 1     No current facility-administered medications for this visit  Objective:    /82 (BP Location: Left arm, Patient Position: Sitting, Cuff Size: Standard)   Pulse 84   Temp 97 7 °F (36 5 °C) (Temporal)   Resp 14   Ht 4' 11" (1 499 m)   Wt 66 2 kg (146 lb)   BMI 29 49 kg/m²        Physical Exam  Vitals signs and nursing note reviewed  Constitutional:       General: She is not in acute distress  Appearance: Normal appearance  HENT:      Head: Normocephalic and atraumatic  Eyes:      General: No scleral icterus  Conjunctiva/sclera: Conjunctivae normal    Neck:      Musculoskeletal: Neck supple  Cardiovascular:      Rate and Rhythm: Normal rate and regular rhythm  Pulses:           Dorsalis pedis pulses are 2+ on the right side and 2+ on the left side  Posterior tibial pulses are 2+ on the right side and 2+ on the left side  Pulmonary:      Effort: Pulmonary effort is normal  No respiratory distress  Breath sounds: Normal breath sounds  Abdominal:      General: Bowel sounds are normal       Palpations: Abdomen is soft  Tenderness: There is no abdominal tenderness  Musculoskeletal:         General: Tenderness present  Comments: No calf erythema  No sig ankle edema  Mild b/l  pretibial edema   Using cane   Feet:      Right foot:      Skin integrity: No ulcer, skin breakdown, erythema, warmth, callus or dry skin  Left foot:      Skin integrity: No ulcer, skin breakdown, erythema, warmth, callus or dry skin  Skin:     General: Skin is warm and dry  Capillary Refill: Capillary refill takes less than 2 seconds  Coloration: Skin is not jaundiced  Neurological:      General: No focal deficit present  Mental Status: She is alert and oriented to person, place, and time  Cranial Nerves: No cranial nerve deficit     Psychiatric:         Mood and Affect: Mood normal          Behavior: Behavior normal  Aly Mendoza MD

## 2021-04-13 NOTE — PATIENT INSTRUCTIONS
Meal Planning with the Plate Method   AMBULATORY CARE:   Meal planning with the plate method  is a simple way for people with diabetes to plan meals  This method can help you eat the right amount of carbohydrates and keep your blood sugar levels under control  Carbohydrates naturally raise your blood sugar level  Your blood sugar level can rise too high if you eat too much carbohydrate at one time  Carbohydrates are found in starches (bread, cereal, starchy vegetables, and beans), fruit, milk, yogurt, and sweets  How to use the plate method to plan meals:   · Draw an imaginary line down the middle of a 9-inch dinner plate  On one side, draw another line to divide that section in half  Your plate will have 3 sections  · Fill the largest section of your plate with non-starchy vegetables  These include broccoli, spinach, cucumbers, peppers, cauliflower, and tomatoes  · Add a starch to 1 of the small sections of your plate  Starches include pasta, rice, whole-grain bread, tortillas, corn, potatoes, and beans  · Add meat or another source of protein to the other small section of your plate  Examples include chicken or turkey without skin, fish, lean beef or pork, low-fat cheese, tofu, or eggs  · Add dairy or fruit to the side of your plate if your meal plan allows  Examples of dairy include skim or 1% milk or low-fat yogurt  If you do not drink milk, you may be able to add another serving of starchy food instead  · Add a low-calorie or calorie-free drink such as water or unsweetened tea or coffee  Serving sizes of foods:   · Non-starchy vegetables:      ? ½ cup of cooked vegetables or 1 cup of raw vegetables    ? ½ cup of vegetable juice    · Starches:      ? 1 ounce of whole-wheat bread or 1 small (6 inch) flour or corn tortilla    ? 1 small (4 inch) pancake (about ¼ inch thick)    ? ¾ cup of dry, unsweetened, whole-grain ready-to-eat cereal or ¼ cup of low-fat granola    ?  ½ cup of cooked cereal or oatmeal    ? ? cup of rice or pasta     ? ½ cup of corn, green peas, sweet potatoes, or mashed potatoes    ? ½ cup of cooked beans and peas (garbanzo, chaney, kidney, white, split, black-eyed)    · Meat and other protein sources:      ? 3 to 4 ounces of any lean meat, fish, or poultry    ? ½ cup of tofu or tempeh    ? 1 large egg    ? 1½ ounces (about 2 tablespoons) of nuts or 2 tablespoons of peanut butter    · Fruit:      ? 1 small piece of fresh fruit     ? ½ cup of canned or fresh fruit or unsweetened fruit juice    ? ¼ cup of dried fruit    · Milk and yogurt:      ? 1 cup (8 ounces) of skim or 1% milk    ? ¾ cup (6 ounces) of plain, non-fat yogurt    Other guidelines to follow:   · Limit salt and sugar  Choose and prepare foods and drinks with less salt and added sugars  Use the nutrition information on food labels to help you make healthy choices  The percent daily value listed on the food label tells you whether a food is low or high in certain nutrients  A percent daily value of 5% or less means that the food is low in a nutrient  A percent daily value of 20% or more means that the food is high in a nutrient  · Choose healthy fats  Choose healthy fats such as polyunsaturated and monounsaturated fats in place of unhealthy fats  Healthy fats are found in vegetable oils such as soybean, corn, canola, olive, and sunflower oil  Unhealthy fats are saturated fats, trans fats, and cholesterol  Unhealthy fats are found in shortening, butter, stick margarine, and animal fat  · Ask your healthcare provider if alcohol is safe for you  and how much is safe for you  If you choose to drink alcohol, drink it with meals  When you drink alcohol on an empty stomach, your blood sugar may fall to a low level  © Copyright [address] Information is for End User's use only and may not be sold, redistributed or otherwise used for commercial purposes   All illustrations and images included in CareNotes® are the copyrighted property of A D A M [employer]  or [facility] Lizzie Moore   The above information is an  only  It is not intended as medical advice for individual conditions or treatments  Talk to your doctor, nurse or pharmacist before following any medical regimen to see if it is safe and effective for you

## 2021-04-15 ENCOUNTER — PROCEDURE ONLY (OUTPATIENT)
Dept: URBAN - METROPOLITAN AREA CLINIC 27 | Facility: CLINIC | Age: 86
End: 2021-04-15

## 2021-04-15 DIAGNOSIS — H35.3231: ICD-10-CM

## 2021-04-15 PROCEDURE — 67028 INJECTION EYE DRUG: CPT

## 2021-04-15 PROCEDURE — PFS LUCENTIS 0.5MG PREFILLED SYRINGE

## 2021-04-15 ASSESSMENT — VISUAL ACUITY: OS_CC: 20/50-2

## 2021-04-17 DIAGNOSIS — E03.9 BORDERLINE HYPOTHYROIDISM: Primary | ICD-10-CM

## 2021-04-17 RX ORDER — LEVOTHYROXINE SODIUM 0.03 MG/1
25 TABLET ORAL
Qty: 90 TABLET | Refills: 1 | Status: SHIPPED | OUTPATIENT
Start: 2021-04-17 | End: 2021-10-11

## 2021-04-19 ENCOUNTER — TELEPHONE (OUTPATIENT)
Dept: FAMILY MEDICINE CLINIC | Facility: CLINIC | Age: 86
End: 2021-04-19

## 2021-04-19 NOTE — TELEPHONE ENCOUNTER
----- Message from Pierre Gregorio MD sent at 4/17/2021 12:13 PM EDT -----  Please inform labs show borderline underactive and borderline high cholesterol--also pt should try to increase fluids as BUN is a little high, creatinine is good  I sent in rx for low dose thyroid supplement to pharmacy fr pt to try  The thyroid supplement will help with her metabolism so will help the cholesterol and sugar levels to come down

## 2021-04-19 NOTE — TELEPHONE ENCOUNTER
S/w patient's daughter Margarito Alvarez advised of labs and med at [facility]- she said we can call her for her mom's stuff

## 2021-04-19 NOTE — TELEPHONE ENCOUNTER
Pt advised however she wants he tulio to call and for us to explain tho her the lab results    See Dr Jones's note

## 2021-04-26 ENCOUNTER — IMMUNIZATIONS (OUTPATIENT)
Dept: FAMILY MEDICINE CLINIC | Facility: HOSPITAL | Age: 86
End: 2021-04-26

## 2021-04-26 DIAGNOSIS — Z23 ENCOUNTER FOR IMMUNIZATION: Primary | ICD-10-CM

## 2021-04-26 PROCEDURE — 91301 SARS-COV-2 / COVID-19 MRNA VACCINE (MODERNA) 100 MCG: CPT

## 2021-04-26 PROCEDURE — 0012A SARS-COV-2 / COVID-19 MRNA VACCINE (MODERNA) 100 MCG: CPT

## 2021-05-20 ENCOUNTER — FOLLOW UP (OUTPATIENT)
Dept: URBAN - METROPOLITAN AREA CLINIC 27 | Facility: CLINIC | Age: 86
End: 2021-05-20

## 2021-05-20 DIAGNOSIS — H43.823: ICD-10-CM

## 2021-05-20 DIAGNOSIS — H35.3231: ICD-10-CM

## 2021-05-20 PROCEDURE — 67028 INJECTION EYE DRUG: CPT

## 2021-05-20 PROCEDURE — 92134 CPTRZ OPH DX IMG PST SGM RTA: CPT

## 2021-05-20 PROCEDURE — 92014 COMPRE OPH EXAM EST PT 1/>: CPT | Mod: 25

## 2021-05-20 PROCEDURE — PFS LUCENTIS 0.5MG PREFILLED SYRINGE

## 2021-05-20 ASSESSMENT — VISUAL ACUITY
OD_PH: 20/50-2
OD_CC: 20/60-1
OS_CC: 20/50

## 2021-05-20 ASSESSMENT — TONOMETRY
OD_IOP_MMHG: 20
OS_IOP_MMHG: 21

## 2021-06-03 ENCOUNTER — PROCEDURE ONLY (OUTPATIENT)
Dept: URBAN - METROPOLITAN AREA CLINIC 27 | Facility: CLINIC | Age: 86
End: 2021-06-03

## 2021-06-03 DIAGNOSIS — H35.3231: ICD-10-CM

## 2021-06-03 PROCEDURE — 67028 INJECTION EYE DRUG: CPT

## 2021-06-03 PROCEDURE — PFS LUCENTIS 0.5MG PREFILLED SYRINGE

## 2021-06-03 ASSESSMENT — VISUAL ACUITY: OS_CC: 20/50

## 2021-06-03 ASSESSMENT — TONOMETRY: OS_IOP_MMHG: 20

## 2021-06-08 ENCOUNTER — OFFICE VISIT (OUTPATIENT)
Dept: FAMILY MEDICINE CLINIC | Facility: CLINIC | Age: 86
End: 2021-06-08
Payer: MEDICARE

## 2021-06-08 VITALS
HEIGHT: 59 IN | WEIGHT: 144.6 LBS | TEMPERATURE: 98 F | SYSTOLIC BLOOD PRESSURE: 144 MMHG | DIASTOLIC BLOOD PRESSURE: 78 MMHG | BODY MASS INDEX: 29.15 KG/M2 | RESPIRATION RATE: 16 BRPM | HEART RATE: 88 BPM

## 2021-06-08 DIAGNOSIS — E11.65 UNCONTROLLED TYPE 2 DIABETES MELLITUS WITH HYPERGLYCEMIA (HCC): Primary | ICD-10-CM

## 2021-06-08 DIAGNOSIS — E78.2 MIXED HYPERLIPIDEMIA: ICD-10-CM

## 2021-06-08 DIAGNOSIS — E03.2 HYPOTHYROIDISM DUE TO MEDICATION: ICD-10-CM

## 2021-06-08 LAB — SL AMB POCT GLUCOSE BLD: 173

## 2021-06-08 PROCEDURE — 82948 REAGENT STRIP/BLOOD GLUCOSE: CPT | Performed by: FAMILY MEDICINE

## 2021-06-08 PROCEDURE — 99214 OFFICE O/P EST MOD 30 MIN: CPT | Performed by: FAMILY MEDICINE

## 2021-06-08 NOTE — PATIENT INSTRUCTIONS
Meal Planning with the Plate Method   AMBULATORY CARE:   Meal planning with the plate method  is a simple way for people with diabetes to plan meals  This method can help you eat the right amount of carbohydrates and keep your blood sugar levels under control  Carbohydrates naturally raise your blood sugar level  Your blood sugar level can rise too high if you eat too much carbohydrate at one time  Carbohydrates are found in starches (bread, cereal, starchy vegetables, and beans), fruit, milk, yogurt, and sweets  How to use the plate method to plan meals:   · Draw an imaginary line down the middle of a 9-inch dinner plate  On one side, draw another line to divide that section in half  Your plate will have 3 sections  · Fill the largest section of your plate with non-starchy vegetables  These include broccoli, spinach, cucumbers, peppers, cauliflower, and tomatoes  · Add a starch to 1 of the small sections of your plate  Starches include pasta, rice, whole-grain bread, tortillas, corn, potatoes, and beans  · Add meat or another source of protein to the other small section of your plate  Examples include chicken or turkey without skin, fish, lean beef or pork, low-fat cheese, tofu, or eggs  · Add dairy or fruit to the side of your plate if your meal plan allows  Examples of dairy include skim or 1% milk or low-fat yogurt  If you do not drink milk, you may be able to add another serving of starchy food instead  · Add a low-calorie or calorie-free drink such as water or unsweetened tea or coffee  Serving sizes of foods:   · Non-starchy vegetables:      ? ½ cup of cooked vegetables or 1 cup of raw vegetables    ? ½ cup of vegetable juice    · Starches:      ? 1 ounce of whole-wheat bread or 1 small (6 inch) flour or corn tortilla    ? 1 small (4 inch) pancake (about ¼ inch thick)    ? ¾ cup of dry, unsweetened, whole-grain ready-to-eat cereal or ¼ cup of low-fat granola    ?  ½ cup of cooked cereal or oatmeal    ? ? cup of rice or pasta     ? ½ cup of corn, green peas, sweet potatoes, or mashed potatoes    ? ½ cup of cooked beans and peas (garbanzo, chaney, kidney, white, split, black-eyed)    · Meat and other protein sources:      ? 3 to 4 ounces of any lean meat, fish, or poultry    ? ½ cup of tofu or tempeh    ? 1 large egg    ? 1½ ounces (about 2 tablespoons) of nuts or 2 tablespoons of peanut butter    · Fruit:      ? 1 small piece of fresh fruit     ? ½ cup of canned or fresh fruit or unsweetened fruit juice    ? ¼ cup of dried fruit    · Milk and yogurt:      ? 1 cup (8 ounces) of skim or 1% milk    ? ¾ cup (6 ounces) of plain, non-fat yogurt    Other guidelines to follow:   · Limit salt and sugar  Choose and prepare foods and drinks with less salt and added sugars  Use the nutrition information on food labels to help you make healthy choices  The percent daily value listed on the food label tells you whether a food is low or high in certain nutrients  A percent daily value of 5% or less means that the food is low in a nutrient  A percent daily value of 20% or more means that the food is high in a nutrient  · Choose healthy fats  Choose healthy fats such as polyunsaturated and monounsaturated fats in place of unhealthy fats  Healthy fats are found in vegetable oils such as soybean, corn, canola, olive, and sunflower oil  Unhealthy fats are saturated fats, trans fats, and cholesterol  Unhealthy fats are found in shortening, butter, stick margarine, and animal fat  · Ask your healthcare provider if alcohol is safe for you  and how much is safe for you  If you choose to drink alcohol, drink it with meals  When you drink alcohol on an empty stomach, your blood sugar may fall to a low level  © Copyright [address] Information is for End User's use only and may not be sold, redistributed or otherwise used for commercial purposes   All illustrations and images included in CareNotes® are the copyrighted property of A D A M , Inc  or [facility] Lizzie Moore   The above information is an  only  It is not intended as medical advice for individual conditions or treatments  Talk to your doctor, nurse or pharmacist before following any medical regimen to see if it is safe and effective for you

## 2021-06-24 ENCOUNTER — FOLLOW UP (OUTPATIENT)
Dept: URBAN - METROPOLITAN AREA CLINIC 27 | Facility: CLINIC | Age: 86
End: 2021-06-24

## 2021-06-24 DIAGNOSIS — H43.823: ICD-10-CM

## 2021-06-24 DIAGNOSIS — E11.9: ICD-10-CM

## 2021-06-24 DIAGNOSIS — H35.3231: ICD-10-CM

## 2021-06-24 DIAGNOSIS — H35.3112: ICD-10-CM

## 2021-06-24 PROCEDURE — 67028 INJECTION EYE DRUG: CPT

## 2021-06-24 PROCEDURE — PFS LUCENTIS 0.5MG PREFILLED SYRINGE

## 2021-06-24 PROCEDURE — 92134 CPTRZ OPH DX IMG PST SGM RTA: CPT

## 2021-06-24 PROCEDURE — 92012 INTRM OPH EXAM EST PATIENT: CPT | Mod: 25

## 2021-06-24 ASSESSMENT — VISUAL ACUITY
OD_CC: 20/60
OD_PH: 20/50
OS_CC: 20/50

## 2021-06-24 ASSESSMENT — TONOMETRY
OS_IOP_MMHG: 19
OD_IOP_MMHG: 22

## 2021-06-25 NOTE — PROGRESS NOTES
Assessment/Plan:    1  Uncontrolled type 2 diabetes mellitus with hyperglycemia (HCC)  -     POCT blood glucose  -     Comprehensive metabolic panel; Future  -     Hemoglobin A1C; Future  -     TSH, 3rd generation; Future    2  Mixed hyperlipidemia  -     Comprehensive metabolic panel; Future  -     Hemoglobin A1C; Future  -     TSH, 3rd generation; Future    3  Hypothyroidism due to medication  -     Comprehensive metabolic panel; Future  -     Hemoglobin A1C; Future  -     TSH, 3rd generation; Future    4  BMI 29 0-29 9,adult            Patient Instructions     Meal Planning with the Plate Method   AMBULATORY CARE:   Meal planning with the plate method  is a simple way for people with diabetes to plan meals  This method can help you eat the right amount of carbohydrates and keep your blood sugar levels under control  Carbohydrates naturally raise your blood sugar level  Your blood sugar level can rise too high if you eat too much carbohydrate at one time  Carbohydrates are found in starches (bread, cereal, starchy vegetables, and beans), fruit, milk, yogurt, and sweets  How to use the plate method to plan meals:   · Draw an imaginary line down the middle of a 9-inch dinner plate  On one side, draw another line to divide that section in half  Your plate will have 3 sections  · Fill the largest section of your plate with non-starchy vegetables  These include broccoli, spinach, cucumbers, peppers, cauliflower, and tomatoes  · Add a starch to 1 of the small sections of your plate  Starches include pasta, rice, whole-grain bread, tortillas, corn, potatoes, and beans  · Add meat or another source of protein to the other small section of your plate  Examples include chicken or turkey without skin, fish, lean beef or pork, low-fat cheese, tofu, or eggs  · Add dairy or fruit to the side of your plate if your meal plan allows  Examples of dairy include skim or 1% milk or low-fat yogurt   If you do not drink milk, you may be able to add another serving of starchy food instead  · Add a low-calorie or calorie-free drink such as water or unsweetened tea or coffee  Serving sizes of foods:   · Non-starchy vegetables:      ? ½ cup of cooked vegetables or 1 cup of raw vegetables    ? ½ cup of vegetable juice    · Starches:      ? 1 ounce of whole-wheat bread or 1 small (6 inch) flour or corn tortilla    ? 1 small (4 inch) pancake (about ¼ inch thick)    ? ¾ cup of dry, unsweetened, whole-grain ready-to-eat cereal or ¼ cup of low-fat granola    ? ½ cup of cooked cereal or oatmeal    ? ? cup of rice or pasta     ? ½ cup of corn, green peas, sweet potatoes, or mashed potatoes    ? ½ cup of cooked beans and peas (garbanzo, chaney, kidney, white, split, black-eyed)    · Meat and other protein sources:      ? 3 to 4 ounces of any lean meat, fish, or poultry    ? ½ cup of tofu or tempeh    ? 1 large egg    ? 1½ ounces (about 2 tablespoons) of nuts or 2 tablespoons of peanut butter    · Fruit:      ? 1 small piece of fresh fruit     ? ½ cup of canned or fresh fruit or unsweetened fruit juice    ? ¼ cup of dried fruit    · Milk and yogurt:      ? 1 cup (8 ounces) of skim or 1% milk    ? ¾ cup (6 ounces) of plain, non-fat yogurt    Other guidelines to follow:   · Limit salt and sugar  Choose and prepare foods and drinks with less salt and added sugars  Use the nutrition information on food labels to help you make healthy choices  The percent daily value listed on the food label tells you whether a food is low or high in certain nutrients  A percent daily value of 5% or less means that the food is low in a nutrient  A percent daily value of 20% or more means that the food is high in a nutrient  · Choose healthy fats  Choose healthy fats such as polyunsaturated and monounsaturated fats in place of unhealthy fats  Healthy fats are found in vegetable oils such as soybean, corn, canola, olive, and sunflower oil   Unhealthy fats are saturated fats, trans fats, and cholesterol  Unhealthy fats are found in shortening, butter, stick margarine, and animal fat  · Ask your healthcare provider if alcohol is safe for you  and how much is safe for you  If you choose to drink alcohol, drink it with meals  When you drink alcohol on an empty stomach, your blood sugar may fall to a low level  © Copyright [address] Information is for End User's use only and may not be sold, redistributed or otherwise used for commercial purposes  All illustrations and images included in CareNotes® are the copyrighted property of A D [employer] M , Inc  or [facility]  The above information is an  only  It is not intended as medical advice for individual conditions or treatments  Talk to your doctor, nurse or pharmacist before following any medical regimen to see if it is safe and effective for you  Subjective:      Patient ID: Beverly Hou is a 80 y o  female  Chief Complaint   Patient presents with    Follow-up     thyroid medication    Diabetes     pt had cereal this morning , finger stick 173       HPI  Follow-up  Blood sugars not at goal  Pt making changes in diet  Activity limited  Eye care utf    The following portions of the patient's history were reviewed and updated as appropriate: allergies, current medications, past family history, past medical history, past social history, past surgical history and problem list     Review of Systems   Constitutional: Negative  Respiratory: Negative  Cardiovascular: Negative  Gastrointestinal: Negative  Endocrine:        DM   Musculoskeletal: Positive for arthralgias  Skin: Negative  Allergic/Immunologic: Positive for environmental allergies  Psychiatric/Behavioral: The patient is nervous/anxious            Current Outpatient Medications   Medication Sig Dispense Refill    aspirin (ECOTRIN LOW STRENGTH) 81 mg EC tablet Take 81 mg by mouth daily      ciclopirox (PENLAC) 8 % solution Apply topically daily at bedtime 6 6 mL 0    ibuprofen (ADVIL) 200 mg tablet Take by mouth      levothyroxine 25 mcg tablet Take 1 tablet (25 mcg total) by mouth daily in the early morning 90 tablet 1    metFORMIN (GLUCOPHAGE) 1000 MG tablet Take 1 tablet (1,000 mg total) by mouth daily with breakfast 90 tablet 1    Multiple Vitamins-Minerals (PRESERVISION AREDS) CAPS Take by mouth       No current facility-administered medications for this visit  Objective:    /78 (BP Location: Left arm, Patient Position: Sitting, Cuff Size: Standard)   Pulse 88   Temp 98 °F (36 7 °C)   Resp 16   Ht 4' 11" (1 499 m)   Wt 65 6 kg (144 lb 9 6 oz)   BMI 29 21 kg/m²        Physical Exam  Vitals and nursing note reviewed  Constitutional:       General: She is not in acute distress  Appearance: Normal appearance  HENT:      Head: Normocephalic and atraumatic  Eyes:      General: No scleral icterus  Conjunctiva/sclera: Conjunctivae normal    Cardiovascular:      Rate and Rhythm: Normal rate and regular rhythm  Pulses:           Dorsalis pedis pulses are 2+ on the right side and 2+ on the left side  Posterior tibial pulses are 2+ on the right side and 2+ on the left side  Pulmonary:      Effort: Pulmonary effort is normal  No respiratory distress  Breath sounds: Normal breath sounds  Abdominal:      General: Bowel sounds are normal       Palpations: Abdomen is soft  Tenderness: There is no abdominal tenderness  Musculoskeletal:         General: Tenderness present  Cervical back: Neck supple  Comments: No calf erythema  No sig ankle edema  Mild b/l  pretibial edema   Using cane   Feet:      Right foot:      Skin integrity: No ulcer, skin breakdown, erythema, warmth, callus or dry skin  Left foot:      Skin integrity: No ulcer, skin breakdown, erythema, warmth, callus or dry skin  Skin:     General: Skin is warm and dry        Capillary Refill: Capillary refill takes less than 2 seconds  Coloration: Skin is not jaundiced  Neurological:      General: No focal deficit present  Mental Status: She is alert and oriented to person, place, and time  Cranial Nerves: No cranial nerve deficit     Psychiatric:         Mood and Affect: Mood normal          Behavior: Behavior normal          Karol Clark MD

## 2021-07-29 ENCOUNTER — FOLLOW UP (OUTPATIENT)
Dept: URBAN - METROPOLITAN AREA CLINIC 27 | Facility: CLINIC | Age: 86
End: 2021-07-29

## 2021-07-29 DIAGNOSIS — H35.3231: ICD-10-CM

## 2021-07-29 DIAGNOSIS — H43.823: ICD-10-CM

## 2021-07-29 PROCEDURE — 67028 INJECTION EYE DRUG: CPT

## 2021-07-29 PROCEDURE — 92134 CPTRZ OPH DX IMG PST SGM RTA: CPT

## 2021-07-29 PROCEDURE — PFS LUCENTIS 0.5MG PREFILLED SYRINGE

## 2021-07-29 PROCEDURE — 92012 INTRM OPH EXAM EST PATIENT: CPT | Mod: 25

## 2021-07-29 ASSESSMENT — VISUAL ACUITY
OD_PH: 20/50-2
OS_PH: 20/40-2
OS_CC: 20/50
OD_CC: 20/60+2

## 2021-07-29 ASSESSMENT — TONOMETRY
OD_IOP_MMHG: 17
OS_IOP_MMHG: 20

## 2021-08-05 ENCOUNTER — APPOINTMENT (OUTPATIENT)
Dept: LAB | Facility: CLINIC | Age: 86
End: 2021-08-05
Payer: MEDICARE

## 2021-08-05 DIAGNOSIS — E11.65 UNCONTROLLED TYPE 2 DIABETES MELLITUS WITH HYPERGLYCEMIA (HCC): ICD-10-CM

## 2021-08-05 DIAGNOSIS — E03.2 HYPOTHYROIDISM DUE TO MEDICATION: ICD-10-CM

## 2021-08-05 DIAGNOSIS — E78.2 MIXED HYPERLIPIDEMIA: ICD-10-CM

## 2021-08-05 LAB
ALBUMIN SERPL BCP-MCNC: 3.7 G/DL (ref 3.5–5)
ALP SERPL-CCNC: 71 U/L (ref 46–116)
ALT SERPL W P-5'-P-CCNC: 30 U/L (ref 12–78)
ANION GAP SERPL CALCULATED.3IONS-SCNC: 6 MMOL/L (ref 4–13)
AST SERPL W P-5'-P-CCNC: 14 U/L (ref 5–45)
BILIRUB SERPL-MCNC: 0.62 MG/DL (ref 0.2–1)
BUN SERPL-MCNC: 26 MG/DL (ref 5–25)
CALCIUM SERPL-MCNC: 9.4 MG/DL (ref 8.3–10.1)
CHLORIDE SERPL-SCNC: 105 MMOL/L (ref 100–108)
CO2 SERPL-SCNC: 27 MMOL/L (ref 21–32)
CREAT SERPL-MCNC: 0.8 MG/DL (ref 0.6–1.3)
EST. AVERAGE GLUCOSE BLD GHB EST-MCNC: 131 MG/DL
GFR SERPL CREATININE-BSD FRML MDRD: 66 ML/MIN/1.73SQ M
GLUCOSE P FAST SERPL-MCNC: 235 MG/DL (ref 65–99)
HBA1C MFR BLD: 6.2 %
POTASSIUM SERPL-SCNC: 5.1 MMOL/L (ref 3.5–5.3)
PROT SERPL-MCNC: 6.6 G/DL (ref 6.4–8.2)
SODIUM SERPL-SCNC: 138 MMOL/L (ref 136–145)
TSH SERPL DL<=0.05 MIU/L-ACNC: 2.83 UIU/ML (ref 0.36–3.74)

## 2021-08-05 PROCEDURE — 36415 COLL VENOUS BLD VENIPUNCTURE: CPT

## 2021-08-05 PROCEDURE — 80053 COMPREHEN METABOLIC PANEL: CPT

## 2021-08-05 PROCEDURE — 83036 HEMOGLOBIN GLYCOSYLATED A1C: CPT

## 2021-08-05 PROCEDURE — 84443 ASSAY THYROID STIM HORMONE: CPT

## 2021-08-13 DIAGNOSIS — E11.65 UNCONTROLLED TYPE 2 DIABETES MELLITUS WITH HYPERGLYCEMIA (HCC): ICD-10-CM

## 2021-08-16 ENCOUNTER — OFFICE VISIT (OUTPATIENT)
Dept: FAMILY MEDICINE CLINIC | Facility: CLINIC | Age: 86
End: 2021-08-16
Payer: MEDICARE

## 2021-08-16 VITALS
RESPIRATION RATE: 16 BRPM | DIASTOLIC BLOOD PRESSURE: 78 MMHG | BODY MASS INDEX: 28.79 KG/M2 | TEMPERATURE: 97.2 F | SYSTOLIC BLOOD PRESSURE: 138 MMHG | HEART RATE: 82 BPM | HEIGHT: 59 IN | WEIGHT: 142.8 LBS

## 2021-08-16 DIAGNOSIS — E03.8 OTHER SPECIFIED HYPOTHYROIDISM: ICD-10-CM

## 2021-08-16 DIAGNOSIS — E11.9 TYPE 2 DIABETES MELLITUS TREATED WITHOUT INSULIN (HCC): Primary | ICD-10-CM

## 2021-08-16 PROCEDURE — 99213 OFFICE O/P EST LOW 20 MIN: CPT | Performed by: FAMILY MEDICINE

## 2021-08-31 PROBLEM — E03.8 OTHER SPECIFIED HYPOTHYROIDISM: Status: ACTIVE | Noted: 2020-05-31

## 2021-09-01 NOTE — PROGRESS NOTES
Assessment/Plan:    1  Type 2 diabetes mellitus treated without insulin (HCC)  -     metFORMIN (GLUCOPHAGE) 1000 MG tablet; Take 1 5 tablets (1,500 mg total) by mouth daily with breakfast    2  Other specified hypothyroidism  Assessment & Plan:  rx l-thyroxine      3  BMI 28 0-28 9,adult        Subjective:      Patient ID: Harless Mcardle is a 80 y o  female  Chief Complaint   Patient presents with    Diabetes     f/u       HPI  In w dtr  Overall pt doing well  Hda4m=2 2%  TSH wnl  BP wnl  No acute c/o  Has completed COVID vaccine series    The following portions of the patient's history were reviewed and updated as appropriate: allergies, current medications, past family history, past medical history, past social history, past surgical history and problem list     Review of Systems      Current Outpatient Medications   Medication Sig Dispense Refill    aspirin (ECOTRIN LOW STRENGTH) 81 mg EC tablet Take 81 mg by mouth daily      ciclopirox (PENLAC) 8 % solution Apply topically daily at bedtime 6 6 mL 0    ibuprofen (ADVIL) 200 mg tablet Take by mouth      levothyroxine 25 mcg tablet Take 1 tablet (25 mcg total) by mouth daily in the early morning 90 tablet 1    metFORMIN (GLUCOPHAGE) 1000 MG tablet Take 1 5 tablets (1,500 mg total) by mouth daily with breakfast 135 tablet 3    Multiple Vitamins-Minerals (PRESERVISION AREDS) CAPS Take by mouth       No current facility-administered medications for this visit  Objective:    /78 (BP Location: Left arm, Patient Position: Sitting, Cuff Size: Standard)   Pulse 82   Temp (!) 97 2 °F (36 2 °C)   Resp 16   Ht 4' 11" (1 499 m)   Wt 64 8 kg (142 lb 12 8 oz)   BMI 28 84 kg/m²        Physical Exam  Vitals and nursing note reviewed  Constitutional:       General: She is not in acute distress  Appearance: Normal appearance  HENT:      Head: Normocephalic and atraumatic  Eyes:      General: No scleral icterus       Conjunctiva/sclera: Conjunctivae normal    Cardiovascular:      Rate and Rhythm: Normal rate and regular rhythm  Pulses:           Dorsalis pedis pulses are 2+ on the right side and 2+ on the left side  Posterior tibial pulses are 2+ on the right side and 2+ on the left side  Pulmonary:      Effort: Pulmonary effort is normal  No respiratory distress  Breath sounds: Normal breath sounds  Abdominal:      General: Bowel sounds are normal       Palpations: Abdomen is soft  Tenderness: There is no abdominal tenderness  Musculoskeletal:         General: Tenderness present  Cervical back: Neck supple  Feet:      Right foot:      Skin integrity: No ulcer, skin breakdown, erythema, warmth, callus or dry skin  Left foot:      Skin integrity: No ulcer, skin breakdown, erythema, warmth, callus or dry skin  Skin:     General: Skin is warm and dry  Capillary Refill: Capillary refill takes less than 2 seconds  Coloration: Skin is not jaundiced  Findings: No rash  Neurological:      General: No focal deficit present  Mental Status: She is alert and oriented to person, place, and time  Cranial Nerves: No cranial nerve deficit     Psychiatric:         Mood and Affect: Mood normal          Behavior: Behavior normal            Suri Bravo MD

## 2021-09-13 ENCOUNTER — FOLLOW UP (OUTPATIENT)
Dept: URBAN - METROPOLITAN AREA CLINIC 27 | Facility: CLINIC | Age: 86
End: 2021-09-13

## 2021-09-13 DIAGNOSIS — H35.3231: ICD-10-CM

## 2021-09-13 PROCEDURE — 92134 CPTRZ OPH DX IMG PST SGM RTA: CPT

## 2021-09-13 PROCEDURE — P EYLEA PFS

## 2021-09-13 PROCEDURE — 92012 INTRM OPH EXAM EST PATIENT: CPT | Mod: 25

## 2021-09-13 PROCEDURE — 67028 INJECTION EYE DRUG: CPT

## 2021-09-13 ASSESSMENT — VISUAL ACUITY
OS_CC: 20/50-2
OD_PH: 20/50-2
OD_CC: 20/60-1

## 2021-09-13 ASSESSMENT — TONOMETRY
OS_IOP_MMHG: 22
OD_IOP_MMHG: 21

## 2021-10-11 DIAGNOSIS — E03.9 BORDERLINE HYPOTHYROIDISM: ICD-10-CM

## 2021-10-11 RX ORDER — LEVOTHYROXINE SODIUM 25 UG/1
TABLET ORAL
Qty: 90 TABLET | Refills: 0 | Status: SHIPPED | OUTPATIENT
Start: 2021-10-11 | End: 2021-12-22

## 2021-10-18 ENCOUNTER — FOLLOW UP (OUTPATIENT)
Dept: URBAN - METROPOLITAN AREA CLINIC 27 | Facility: CLINIC | Age: 86
End: 2021-10-18

## 2021-10-18 DIAGNOSIS — H35.3231: ICD-10-CM

## 2021-10-18 PROCEDURE — 92012 INTRM OPH EXAM EST PATIENT: CPT

## 2021-10-18 PROCEDURE — P EYLEA PFS

## 2021-10-18 PROCEDURE — 92134 CPTRZ OPH DX IMG PST SGM RTA: CPT

## 2021-10-18 PROCEDURE — 67028 INJECTION EYE DRUG: CPT

## 2021-10-18 ASSESSMENT — VISUAL ACUITY
OD_CC: 20/60-2
OS_CC: 20/50-2

## 2021-10-18 ASSESSMENT — TONOMETRY
OD_IOP_MMHG: 22
OS_IOP_MMHG: 22

## 2021-12-09 ENCOUNTER — FOLLOW UP (OUTPATIENT)
Dept: URBAN - METROPOLITAN AREA CLINIC 27 | Facility: CLINIC | Age: 86
End: 2021-12-09

## 2021-12-09 DIAGNOSIS — H43.823: ICD-10-CM

## 2021-12-09 DIAGNOSIS — E11.9: ICD-10-CM

## 2021-12-09 DIAGNOSIS — H35.3231: ICD-10-CM

## 2021-12-09 PROCEDURE — P EYLEA PFS

## 2021-12-09 PROCEDURE — 92014 COMPRE OPH EXAM EST PT 1/>: CPT | Mod: 25

## 2021-12-09 PROCEDURE — 67028 INJECTION EYE DRUG: CPT

## 2021-12-09 PROCEDURE — 92134 CPTRZ OPH DX IMG PST SGM RTA: CPT

## 2021-12-09 ASSESSMENT — TONOMETRY
OD_IOP_MMHG: 19
OS_IOP_MMHG: 19

## 2021-12-09 ASSESSMENT — VISUAL ACUITY
OD_CC: 20/80-2
OS_CC: 20/70+2

## 2021-12-21 DIAGNOSIS — E03.9 BORDERLINE HYPOTHYROIDISM: ICD-10-CM

## 2021-12-22 RX ORDER — LEVOTHYROXINE SODIUM 25 UG/1
TABLET ORAL
Qty: 90 TABLET | Refills: 0 | Status: SHIPPED | OUTPATIENT
Start: 2021-12-22 | End: 2022-05-09 | Stop reason: SDUPTHER

## 2022-01-01 DIAGNOSIS — E11.65 TYPE 2 DIABETES MELLITUS WITH HYPERGLYCEMIA, WITHOUT LONG-TERM CURRENT USE OF INSULIN (HCC): Primary | ICD-10-CM

## 2022-01-01 RX ORDER — GLIMEPIRIDE 2 MG/1
2 TABLET ORAL
Qty: 30 TABLET | Refills: 5 | Status: SHIPPED | OUTPATIENT
Start: 2022-01-01 | End: 2022-01-01

## 2022-01-05 ENCOUNTER — OFFICE VISIT (OUTPATIENT)
Dept: FAMILY MEDICINE CLINIC | Facility: CLINIC | Age: 87
End: 2022-01-05
Payer: MEDICARE

## 2022-01-05 VITALS
WEIGHT: 142 LBS | TEMPERATURE: 97.1 F | RESPIRATION RATE: 16 BRPM | BODY MASS INDEX: 28.63 KG/M2 | SYSTOLIC BLOOD PRESSURE: 140 MMHG | HEART RATE: 84 BPM | HEIGHT: 59 IN | DIASTOLIC BLOOD PRESSURE: 80 MMHG

## 2022-01-05 DIAGNOSIS — R60.0 LEG EDEMA: Primary | ICD-10-CM

## 2022-01-05 DIAGNOSIS — E03.9 BORDERLINE HYPOTHYROIDISM: ICD-10-CM

## 2022-01-05 DIAGNOSIS — E11.65 UNCONTROLLED TYPE 2 DIABETES MELLITUS WITH HYPERGLYCEMIA (HCC): ICD-10-CM

## 2022-01-05 DIAGNOSIS — R53.83 OTHER FATIGUE: ICD-10-CM

## 2022-01-05 DIAGNOSIS — Z00.00 MEDICARE ANNUAL WELLNESS VISIT, SUBSEQUENT: ICD-10-CM

## 2022-01-05 DIAGNOSIS — E78.2 MIXED HYPERLIPIDEMIA: ICD-10-CM

## 2022-01-05 LAB — SL AMB POCT HEMOGLOBIN AIC: 9.4 (ref ?–6.5)

## 2022-01-05 PROCEDURE — G0439 PPPS, SUBSEQ VISIT: HCPCS | Performed by: FAMILY MEDICINE

## 2022-01-05 PROCEDURE — 99214 OFFICE O/P EST MOD 30 MIN: CPT | Performed by: FAMILY MEDICINE

## 2022-01-05 PROCEDURE — 83036 HEMOGLOBIN GLYCOSYLATED A1C: CPT | Performed by: FAMILY MEDICINE

## 2022-01-05 RX ORDER — HYDROCHLOROTHIAZIDE 12.5 MG/1
TABLET ORAL
Qty: 30 TABLET | Refills: 1 | Status: SHIPPED | OUTPATIENT
Start: 2022-01-05 | End: 2022-07-11 | Stop reason: ALTCHOICE
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
## 2022-01-05 NOTE — PATIENT INSTRUCTIONS
Medicare Preventive Visit Patient Instructions  Thank you for completing your Welcome to Medicare Visit or Medicare Annual Wellness Visit today  Your next wellness visit will be due in one year (1/6/2023)  The screening/preventive services that you may require over the next 5-10 years are detailed below  Some tests may not apply to you based off risk factors and/or age  Screening tests ordered at today's visit but not completed yet may show as past due  Also, please note that scanned in results may not display below  Preventive Screenings:  Service Recommendations Previous Testing/Comments   Colorectal Cancer Screening  * Colonoscopy    * Fecal Occult Blood Test (FOBT)/Fecal Immunochemical Test (FIT)  * Fecal DNA/Cologuard Test  * Flexible Sigmoidoscopy Age: 54-65 years old   Colonoscopy: every 10 years (may be performed more frequently if at higher risk)  OR  FOBT/FIT: every 1 year  OR  Cologuard: every 3 years  OR  Sigmoidoscopy: every 5 years  Screening may be recommended earlier than age 48 if at higher risk for colorectal cancer  Also, an individualized decision between you and your healthcare provider will decide whether screening between the ages of 74-80 would be appropriate  Colonoscopy: Not on file  FOBT/FIT: Not on file  Cologuard: Not on file  Sigmoidoscopy: Not on file    Screening Not Indicated     Breast Cancer Screening Age: 36 years old  Frequency: every 1-2 years  Not required if history of left and right mastectomy Mammogram: Not on file        Cervical Cancer Screening Between the ages of 21-29, pap smear recommended once every 3 years  Between the ages of 33-67, can perform pap smear with HPV co-testing every 5 years     Recommendations may differ for women with a history of total hysterectomy, cervical cancer, or abnormal pap smears in past  Pap Smear: Not on file    Screening Not Indicated   Hepatitis C Screening Once for adults born between 1945 and 1965  More frequently in patients at high risk for Hepatitis C Hep C Antibody: Not on file        Diabetes Screening 1-2 times per year if you're at risk for diabetes or have pre-diabetes Fasting glucose: 235 mg/dL   A1C: 6 2 %    Screening Not Indicated  History Diabetes   Cholesterol Screening Once every 5 years if you don't have a lipid disorder  May order more often based on risk factors  Lipid panel: 04/13/2021    Screening Not Indicated  History Lipid Disorder     Other Preventive Screenings Covered by Medicare:  1  Abdominal Aortic Aneurysm (AAA) Screening: covered once if your at risk  You're considered to be at risk if you have a family history of AAA  2  Lung Cancer Screening: covers low dose CT scan once per year if you meet all of the following conditions: (1) Age 50-69; (2) No signs or symptoms of lung cancer; (3) Current smoker or have quit smoking within the last 15 years; (4) You have a tobacco smoking history of at least 30 pack years (packs per day multiplied by number of years you smoked); (5) You get a written order from a healthcare provider  3  Glaucoma Screening: covered annually if you're considered high risk: (1) You have diabetes OR (2) Family history of glaucoma OR (3)  aged 48 and older OR (3)  American aged 72 and older  3  Osteoporosis Screening: covered every 2 years if you meet one of the following conditions: (1) You're estrogen deficient and at risk for osteoporosis based off medical history and other findings; (2) Have a vertebral abnormality; (3) On glucocorticoid therapy for more than 3 months; (4) Have primary hyperparathyroidism; (5) On osteoporosis medications and need to assess response to drug therapy  · Last bone density test (DXA Scan): Not on file  5  HIV Screening: covered annually if you're between the age of 12-76  Also covered annually if you are younger than 13 and older than 72 with risk factors for HIV infection   For pregnant patients, it is covered up to 3 times per pregnancy  Immunizations:  Immunization Recommendations   Influenza Vaccine Annual influenza vaccination during flu season is recommended for all persons aged >= 6 months who do not have contraindications   Pneumococcal Vaccine (Prevnar and Pneumovax)  * Prevnar = PCV13  * Pneumovax = PPSV23   Adults 25-60 years old: 1-3 doses may be recommended based on certain risk factors  Adults 72 years old: Prevnar (PCV13) vaccine recommended followed by Pneumovax (PPSV23) vaccine  If already received PPSV23 since turning 65, then PCV13 recommended at least one year after PPSV23 dose  Hepatitis B Vaccine 3 dose series if at intermediate or high risk (ex: diabetes, end stage renal disease, liver disease)   Tetanus (Td) Vaccine - COST NOT COVERED BY MEDICARE PART B Following completion of primary series, a booster dose should be given every 10 years to maintain immunity against tetanus  Td may also be given as tetanus wound prophylaxis  Tdap Vaccine - COST NOT COVERED BY MEDICARE PART B Recommended at least once for all adults  For pregnant patients, recommended with each pregnancy  Shingles Vaccine (Shingrix) - COST NOT COVERED BY MEDICARE PART B  2 shot series recommended in those aged 48 and above     Health Maintenance Due:  There are no preventive care reminders to display for this patient  Immunizations Due:  There are no preventive care reminders to display for this patient  Advance Directives   What are advance directives? Advance directives are legal documents that state your wishes and plans for medical care  These plans are made ahead of time in case you lose your ability to make decisions for yourself  Advance directives can apply to any medical decision, such as the treatments you want, and if you want to donate organs  What are the types of advance directives? There are many types of advance directives, and each state has rules about how to use them   You may choose a combination of any of the following:  · Living will: This is a written record of the treatment you want  You can also choose which treatments you do not want, which to limit, and which to stop at a certain time  This includes surgery, medicine, IV fluid, and tube feedings  · Durable power of  for healthcare [facility]): This is a written record that states who you want to make healthcare choices for you when you are unable to make them for yourself  This person, called a proxy, is usually a family member or a friend  You may choose more than 1 proxy  · Do not resuscitate (DNR) order:  A DNR order is used in case your heart stops beating or you stop breathing  It is a request not to have certain forms of treatment, such as CPR  A DNR order may be included in other types of advance directives  · Medical directive: This covers the care that you want if you are in a coma, near death, or unable to make decisions for yourself  You can list the treatments you want for each condition  Treatment may include pain medicine, surgery, blood transfusions, dialysis, IV or tube feedings, and a ventilator (breathing machine)  · Values history: This document has questions about your views, beliefs, and how you feel and think about life  This information can help others choose the care that you would choose  Why are advance directives important? An advance directive helps you control your care  Although spoken wishes may be used, it is better to have your wishes written down  Spoken wishes can be misunderstood, or not followed  Treatments may be given even if you do not want them  An advance directive may make it easier for your family to make difficult choices about your care  Urinary Incontinence   Urinary incontinence (UI)  is when you lose control of your bladder  UI develops because your bladder cannot store or empty urine properly  The 3 most common types of UI are stress incontinence, urge incontinence, or both    Medicines:   · May be given to help strengthen your bladder control  Report any side effects of medication to your healthcare provider  Do pelvic muscle exercises often:  Your pelvic muscles help you stop urinating  Squeeze these muscles tight for 5 seconds, then relax for 5 seconds  Gradually work up to squeezing for 10 seconds  Do 3 sets of 15 repetitions a day, or as directed  This will help strengthen your pelvic muscles and improve bladder control  Train your bladder:  Go to the bathroom at set times, such as every 2 hours, even if you do not feel the urge to go  You can also try to hold your urine when you feel the urge to go  For example, hold your urine for 5 minutes when you feel the urge to go  As that becomes easier, hold your urine for 10 minutes  Self-care:   · Keep a UI record  Write down how often you leak urine and how much you leak  Make a note of what you were doing when you leaked urine  · Drink liquids as directed  You may need to limit the amount of liquid you drink to help control your urine leakage  Do not drink any liquid right before you go to bed  Limit or do not have drinks that contain caffeine or alcohol  · Prevent constipation  Eat a variety of high-fiber foods  Good examples are high-fiber cereals, beans, vegetables, and whole-grain breads  Walking is the best way to trigger your intestines to have a bowel movement  · Exercise regularly and maintain a healthy weight  Weight loss and exercise will decrease pressure on your bladder and help you control your leakage  · Use a catheter as directed  to help empty your bladder  A catheter is a tiny, plastic tube that is put into your bladder to drain your urine  · Go to behavior therapy as directed  Behavior therapy may be used to help you learn to control your urge to urinate      Weight Management   Why it is important to manage your weight:  Being overweight increases your risk of health conditions such as heart disease, high blood pressure, type 2 diabetes, and certain types of cancer  It can also increase your risk for osteoarthritis, sleep apnea, and other respiratory problems  Aim for a slow, steady weight loss  Even a small amount of weight loss can lower your risk of health problems  How to lose weight safely:  A safe and healthy way to lose weight is to eat fewer calories and get regular exercise  You can lose up about 1 pound a week by decreasing the number of calories you eat by 500 calories each day  Healthy meal plan for weight management:  A healthy meal plan includes a variety of foods, contains fewer calories, and helps you stay healthy  A healthy meal plan includes the following:  · Eat whole-grain foods more often  A healthy meal plan should contain fiber  Fiber is the part of grains, fruits, and vegetables that is not broken down by your body  Whole-grain foods are healthy and provide extra fiber in your diet  Some examples of whole-grain foods are whole-wheat breads and pastas, oatmeal, brown rice, and bulgur  · Eat a variety of vegetables every day  Include dark, leafy greens such as spinach, kale, geovanna greens, and mustard greens  Eat yellow and orange vegetables such as carrots, sweet potatoes, and winter squash  · Eat a variety of fruits every day  Choose fresh or canned fruit (canned in its own juice or light syrup) instead of juice  Fruit juice has very little or no fiber  · Eat low-fat dairy foods  Drink fat-free (skim) milk or 1% milk  Eat fat-free yogurt and low-fat cottage cheese  Try low-fat cheeses such as mozzarella and other reduced-fat cheeses  · Choose meat and other protein foods that are low in fat  Choose beans or other legumes such as split peas or lentils  Choose fish, skinless poultry (chicken or turkey), or lean cuts of red meat (beef or pork)  Before you cook meat or poultry, cut off any visible fat  · Use less fat and oil  Try baking foods instead of frying them   Add less fat, such as margarine, sour cream, regular salad dressing and mayonnaise to foods  Eat fewer high-fat foods  Some examples of high-fat foods include french fries, doughnuts, ice cream, and cakes  · Eat fewer sweets  Limit foods and drinks that are high in sugar  This includes candy, cookies, regular soda, and sweetened drinks  Exercise:  Exercise at least 30 minutes per day on most days of the week  Some examples of exercise include walking, biking, dancing, and swimming  You can also fit in more physical activity by taking the stairs instead of the elevator or parking farther away from stores  Ask your healthcare provider about the best exercise plan for you  © Copyright [employer] 2018 Information is for End User's use only and may not be sold, redistributed or otherwise used for commercial purposes   All illustrations and images included in CareNotes® are the copyrighted property of [employer]  or [address] [place] [address]

## 2022-01-10 LAB
ALBUMIN/CREAT UR: 999 MG/G CREAT (ref 0–29)
CREAT UR-MCNC: 45.4 MG/DL
MICROALBUMIN UR-MCNC: 453.5 UG/ML

## 2022-01-13 ENCOUNTER — FOLLOW UP (OUTPATIENT)
Dept: URBAN - METROPOLITAN AREA CLINIC 27 | Facility: CLINIC | Age: 87
End: 2022-01-13

## 2022-01-13 DIAGNOSIS — H35.3231: ICD-10-CM

## 2022-01-13 DIAGNOSIS — H43.823: ICD-10-CM

## 2022-01-13 DIAGNOSIS — E11.9: ICD-10-CM

## 2022-01-13 PROCEDURE — 92012 INTRM OPH EXAM EST PATIENT: CPT

## 2022-01-13 PROCEDURE — 92134 CPTRZ OPH DX IMG PST SGM RTA: CPT

## 2022-01-13 ASSESSMENT — VISUAL ACUITY
OD_CC: 20/60-1
OS_CC: 20/60
OS_PH: 20/50-1

## 2022-01-13 ASSESSMENT — TONOMETRY
OD_IOP_MMHG: 20
OS_IOP_MMHG: 21

## 2022-01-15 PROBLEM — R60.0 LEG EDEMA: Status: ACTIVE | Noted: 2022-01-15

## 2022-01-15 PROBLEM — R53.83 OTHER FATIGUE: Status: ACTIVE | Noted: 2022-01-15

## 2022-01-15 NOTE — ASSESSMENT & PLAN NOTE
prob sec t dependent edema/incompetent valves  B/l compression stockings  B/l leg elevation at least twice daily during day
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
## 2022-01-15 NOTE — ASSESSMENT & PLAN NOTE
Lab Results   Component Value Date    HGBA1C 9 4 (A) 01/05/2022   encouraged compliance w diet/medications/inc activity as tolerated  Rpt hgac1 in 3 mths  maintain reg eye/foot checks

## 2022-02-17 ENCOUNTER — FOLLOW UP (OUTPATIENT)
Dept: URBAN - METROPOLITAN AREA CLINIC 27 | Facility: CLINIC | Age: 87
End: 2022-02-17

## 2022-02-17 DIAGNOSIS — H43.823: ICD-10-CM

## 2022-02-17 DIAGNOSIS — E11.9: ICD-10-CM

## 2022-02-17 DIAGNOSIS — H31.8: ICD-10-CM

## 2022-02-17 DIAGNOSIS — H35.3231: ICD-10-CM

## 2022-02-17 PROCEDURE — P EYLEA PFS

## 2022-02-17 PROCEDURE — 92134 CPTRZ OPH DX IMG PST SGM RTA: CPT

## 2022-02-17 PROCEDURE — 92012 INTRM OPH EXAM EST PATIENT: CPT | Mod: 25

## 2022-02-17 PROCEDURE — 67028 INJECTION EYE DRUG: CPT

## 2022-02-17 ASSESSMENT — TONOMETRY
OS_IOP_MMHG: 19
OD_IOP_MMHG: 18

## 2022-02-17 ASSESSMENT — VISUAL ACUITY
OD_CC: 20/60-2
OS_CC: 20/50-1

## 2022-04-12 ENCOUNTER — TELEPHONE (OUTPATIENT)
Dept: FAMILY MEDICINE CLINIC | Facility: CLINIC | Age: 87
End: 2022-04-12

## 2022-04-12 NOTE — TELEPHONE ENCOUNTER
Progressive - Daily wearOD-0.25+1.7515+[id number]/40 +&nbsp;SN &nbsp; &nbsp; bmb Spoke w dtr--rx was sent--dtr will make follow-up appt in cple weeks to follow-up on anxiety and memory changes-

## 2022-04-12 NOTE — TELEPHONE ENCOUNTER
Patient's daughter called once more  She is requesting ativan rx sent and correct pharmacy is [phone]

## 2022-04-12 NOTE — TELEPHONE ENCOUNTER
Dr Tacho Stockton:    Patient's daughter called asking if you can send in low dose of xanax for patient? Her son fredo just passed away and is experiencing grief and nervousness    Please send to [facility] in [place]

## 2022-04-21 ENCOUNTER — FOLLOW UP (OUTPATIENT)
Dept: URBAN - METROPOLITAN AREA CLINIC 27 | Facility: CLINIC | Age: 87
End: 2022-04-21

## 2022-04-21 DIAGNOSIS — E11.9: ICD-10-CM

## 2022-04-21 DIAGNOSIS — H43.823: ICD-10-CM

## 2022-04-21 DIAGNOSIS — Z96.1: ICD-10-CM

## 2022-04-21 DIAGNOSIS — H31.8: ICD-10-CM

## 2022-04-21 DIAGNOSIS — H35.3231: ICD-10-CM

## 2022-04-21 PROCEDURE — 92134 CPTRZ OPH DX IMG PST SGM RTA: CPT

## 2022-04-21 PROCEDURE — 92014 COMPRE OPH EXAM EST PT 1/>: CPT

## 2022-04-21 ASSESSMENT — TONOMETRY
OD_IOP_MMHG: 19
OS_IOP_MMHG: 19

## 2022-04-21 ASSESSMENT — VISUAL ACUITY
OD_CC: 20/60-
OS_CC: 20/50-2

## 2022-05-09 DIAGNOSIS — E03.9 BORDERLINE HYPOTHYROIDISM: ICD-10-CM

## 2022-05-09 RX ORDER — LEVOTHYROXINE SODIUM 0.03 MG/1
25 TABLET ORAL EVERY MORNING
Qty: 90 TABLET | Refills: 0 | Status: SHIPPED | OUTPATIENT
Start: 2022-05-09 | End: 2022-07-11 | Stop reason: SDUPTHER

## 2022-06-20 ENCOUNTER — APPOINTMENT (OUTPATIENT)
Dept: LAB | Facility: CLINIC | Age: 87
End: 2022-06-20
Payer: MEDICARE

## 2022-06-20 DIAGNOSIS — E11.65 UNCONTROLLED TYPE 2 DIABETES MELLITUS WITH HYPERGLYCEMIA (HCC): ICD-10-CM

## 2022-06-20 DIAGNOSIS — E78.2 MIXED HYPERLIPIDEMIA: ICD-10-CM

## 2022-06-20 DIAGNOSIS — R60.0 LEG EDEMA: ICD-10-CM

## 2022-06-20 DIAGNOSIS — E03.9 BORDERLINE HYPOTHYROIDISM: ICD-10-CM

## 2022-06-20 DIAGNOSIS — R53.83 OTHER FATIGUE: ICD-10-CM

## 2022-06-20 LAB
ALBUMIN SERPL BCP-MCNC: 3.8 G/DL (ref 3.5–5)
ALP SERPL-CCNC: 76 U/L (ref 46–116)
ALT SERPL W P-5'-P-CCNC: 27 U/L (ref 12–78)
ANION GAP SERPL CALCULATED.3IONS-SCNC: 9 MMOL/L (ref 4–13)
AST SERPL W P-5'-P-CCNC: 15 U/L (ref 5–45)
BILIRUB SERPL-MCNC: 0.44 MG/DL (ref 0.2–1)
BUN SERPL-MCNC: 27 MG/DL (ref 5–25)
CALCIUM SERPL-MCNC: 9.5 MG/DL (ref 8.3–10.1)
CHLORIDE SERPL-SCNC: 104 MMOL/L (ref 100–108)
CHOLEST SERPL-MCNC: 244 MG/DL
CO2 SERPL-SCNC: 24 MMOL/L (ref 21–32)
CREAT SERPL-MCNC: 0.8 MG/DL (ref 0.6–1.3)
ERYTHROCYTE [DISTWIDTH] IN BLOOD BY AUTOMATED COUNT: 12.6 % (ref 11.6–15.1)
GFR SERPL CREATININE-BSD FRML MDRD: 65 ML/MIN/1.73SQ M
GLUCOSE P FAST SERPL-MCNC: 240 MG/DL (ref 65–99)
HCT VFR BLD AUTO: 41.3 % (ref 34.8–46.1)
HDLC SERPL-MCNC: 52 MG/DL
HGB BLD-MCNC: 13.4 G/DL (ref 11.5–15.4)
LDLC SERPL CALC-MCNC: 160 MG/DL (ref 0–100)
MCH RBC QN AUTO: 31.1 PG (ref 26.8–34.3)
MCHC RBC AUTO-ENTMCNC: 32.4 G/DL (ref 31.4–37.4)
MCV RBC AUTO: 96 FL (ref 82–98)
PLATELET # BLD AUTO: 256 THOUSANDS/UL (ref 149–390)
PMV BLD AUTO: 10.5 FL (ref 8.9–12.7)
POTASSIUM SERPL-SCNC: 5.1 MMOL/L (ref 3.5–5.3)
PROT SERPL-MCNC: 7.2 G/DL (ref 6.4–8.2)
RBC # BLD AUTO: 4.31 MILLION/UL (ref 3.81–5.12)
SODIUM SERPL-SCNC: 137 MMOL/L (ref 136–145)
TRIGL SERPL-MCNC: 159 MG/DL
TSH SERPL DL<=0.05 MIU/L-ACNC: 3.01 UIU/ML (ref 0.45–4.5)
WBC # BLD AUTO: 7.8 THOUSAND/UL (ref 4.31–10.16)

## 2022-06-20 PROCEDURE — 80053 COMPREHEN METABOLIC PANEL: CPT

## 2022-06-20 PROCEDURE — 80061 LIPID PANEL: CPT

## 2022-06-20 PROCEDURE — 84443 ASSAY THYROID STIM HORMONE: CPT

## 2022-06-20 PROCEDURE — 36415 COLL VENOUS BLD VENIPUNCTURE: CPT

## 2022-06-20 PROCEDURE — 85027 COMPLETE CBC AUTOMATED: CPT

## 2022-06-20 PROCEDURE — 83036 HEMOGLOBIN GLYCOSYLATED A1C: CPT

## 2022-06-21 LAB
EST. AVERAGE GLUCOSE BLD GHB EST-MCNC: 237 MG/DL
HBA1C MFR BLD: 9.9 %

## 2022-06-23 ENCOUNTER — FOLLOW UP (OUTPATIENT)
Dept: URBAN - METROPOLITAN AREA CLINIC 27 | Facility: CLINIC | Age: 87
End: 2022-06-23

## 2022-06-23 DIAGNOSIS — E11.9: ICD-10-CM

## 2022-06-23 DIAGNOSIS — H43.823: ICD-10-CM

## 2022-06-23 DIAGNOSIS — H35.3231: ICD-10-CM

## 2022-06-23 PROCEDURE — 67028 INJECTION EYE DRUG: CPT

## 2022-06-23 PROCEDURE — 92012 INTRM OPH EXAM EST PATIENT: CPT | Mod: 25

## 2022-06-23 PROCEDURE — P EYLEA PFS

## 2022-06-23 PROCEDURE — 92134 CPTRZ OPH DX IMG PST SGM RTA: CPT

## 2022-06-23 ASSESSMENT — VISUAL ACUITY
OD_CC: 20/80-2
OS_CC: 20/60-1

## 2022-06-23 ASSESSMENT — TONOMETRY
OD_IOP_MMHG: 20
OS_IOP_MMHG: 22

## 2022-07-07 ENCOUNTER — PROCEDURE ONLY (OUTPATIENT)
Dept: URBAN - METROPOLITAN AREA CLINIC 27 | Facility: CLINIC | Age: 87
End: 2022-07-07

## 2022-07-07 DIAGNOSIS — H35.3231: ICD-10-CM

## 2022-07-07 PROCEDURE — P EYLEA PFS

## 2022-07-07 PROCEDURE — 67028 INJECTION EYE DRUG: CPT

## 2022-07-07 ASSESSMENT — VISUAL ACUITY: OD_CC: 20/200+1

## 2022-07-07 ASSESSMENT — TONOMETRY: OD_IOP_MMHG: 22

## 2022-07-11 ENCOUNTER — OFFICE VISIT (OUTPATIENT)
Dept: FAMILY MEDICINE CLINIC | Facility: CLINIC | Age: 87
End: 2022-07-11
Payer: MEDICARE

## 2022-07-11 VITALS
BODY MASS INDEX: 27.01 KG/M2 | DIASTOLIC BLOOD PRESSURE: 62 MMHG | TEMPERATURE: 98.5 F | WEIGHT: 134 LBS | HEART RATE: 70 BPM | RESPIRATION RATE: 16 BRPM | HEIGHT: 59 IN | SYSTOLIC BLOOD PRESSURE: 118 MMHG

## 2022-07-11 DIAGNOSIS — E78.2 MIXED HYPERLIPIDEMIA: ICD-10-CM

## 2022-07-11 DIAGNOSIS — E11.65 TYPE 2 DIABETES MELLITUS WITH HYPERGLYCEMIA, WITHOUT LONG-TERM CURRENT USE OF INSULIN (HCC): Primary | ICD-10-CM

## 2022-07-11 DIAGNOSIS — E03.9 HYPOTHYROIDISM, UNSPECIFIED TYPE: ICD-10-CM

## 2022-07-11 DIAGNOSIS — M17.0 BILATERAL PRIMARY OSTEOARTHRITIS OF KNEE: ICD-10-CM

## 2022-07-11 PROCEDURE — 99214 OFFICE O/P EST MOD 30 MIN: CPT | Performed by: FAMILY MEDICINE

## 2022-07-11 RX ORDER — LEVOTHYROXINE SODIUM 0.03 MG/1
25 TABLET ORAL EVERY MORNING
Qty: 90 TABLET | Refills: 1 | Status: SHIPPED | OUTPATIENT
Start: 2022-07-11

## 2022-07-19 ENCOUNTER — TELEPHONE (OUTPATIENT)
Dept: FAMILY MEDICINE CLINIC | Facility: CLINIC | Age: 87
End: 2022-07-19

## 2022-07-19 NOTE — TELEPHONE ENCOUNTER
Daughter Randolph Flores called about the additional medication for her moms A1C that you were going to send to [facility]

## 2022-07-19 NOTE — TELEPHONE ENCOUNTER
I sent in rx on 7/11/2022--the combined tablet-Janumet to take twice daily--this was to replace the metformin

## 2022-07-20 DIAGNOSIS — E11.65 TYPE 2 DIABETES MELLITUS WITH HYPERGLYCEMIA, WITHOUT LONG-TERM CURRENT USE OF INSULIN (HCC): Primary | ICD-10-CM

## 2022-07-24 NOTE — PROGRESS NOTES
Assessment/Plan:    1  Type 2 diabetes mellitus with hyperglycemia, without long-term current use of insulin (HCC)  Assessment & Plan:    Lab Results   Component Value Date    HGBA1C 9 9 (H) 06/20/2022   uncontrolled  Encouraged compliance w diet  Inc activity as tolerated  Add adtl DM med    Addendum:  Januvia and Rybelsus not covered  rx for amaryl sent to pharm  Cont t monitor blood sugars  Rpt hga1c in 3 mths    Orders:  -     Diabetic foot exam; Future    2  Hypothyroidism, unspecified type  -     levothyroxine (Euthyrox) 25 mcg tablet; Take 1 tablet (25 mcg total) by mouth every morning    3  Bilateral primary osteoarthritis of knee    4  Mixed hyperlipidemia  Assessment & Plan:  Low chol diet  Maintain euthryoid state  Declines statin      5  BMI 27 0-27 9,adult        Subjective:      Patient ID: Javed Michael is a 80 y o  female  Chief Complaint   Patient presents with    Results     Labs     Diabetes     Follow up        HPI  In w dtr for follow-up  Interval hx reviewed  Labs--increase in qsa2f=8 9%, lipids elevated, TSH=3 010  Pt compliant w med, +/-compliance w diet, limited activity due to arthritis  BP in range  Eye care utd    The following portions of the patient's history were reviewed and updated as appropriate: allergies, current medications, past family history, past medical history, past social history, past surgical history and problem list     Review of Systems   Constitutional: Negative  Respiratory: Negative  Cardiovascular: Negative  Gastrointestinal: Negative  Endocrine:        DM   Musculoskeletal: Positive for arthralgias  Skin: Negative  Allergic/Immunologic: Positive for environmental allergies  Psychiatric/Behavioral: The patient is nervous/anxious            Current Outpatient Medications   Medication Sig Dispense Refill    ibuprofen (MOTRIN) 200 mg tablet Take by mouth      levothyroxine (Euthyrox) 25 mcg tablet Take 1 tablet (25 mcg total) by mouth every morning 90 tablet 1    Multiple Vitamins-Minerals (PRESERVISION AREDS) CAPS Take by mouth      glimepiride (AMARYL) 2 mg tablet Take 1 tablet (2 mg total) by mouth daily with breakfast 30 tablet 5     No current facility-administered medications for this visit  Objective:    /62 (BP Location: Left arm, Patient Position: Sitting, Cuff Size: Large)   Pulse 70   Temp 98 5 °F (36 9 °C)   Resp 16   Ht 4' 11" (1 499 m)   Wt 60 8 kg (134 lb)   BMI 27 06 kg/m²        Physical Exam  Vitals and nursing note reviewed  Constitutional:       General: She is not in acute distress  Appearance: Normal appearance  HENT:      Head: Normocephalic and atraumatic  Eyes:      General: No scleral icterus  Conjunctiva/sclera: Conjunctivae normal    Cardiovascular:      Rate and Rhythm: Normal rate and regular rhythm  Pulses: no weak pulses          Dorsalis pedis pulses are 2+ on the right side and 2+ on the left side  Posterior tibial pulses are 2+ on the right side and 2+ on the left side  Pulmonary:      Effort: Pulmonary effort is normal  No respiratory distress  Breath sounds: Normal breath sounds  Abdominal:      General: Bowel sounds are normal       Palpations: Abdomen is soft  Tenderness: There is no abdominal tenderness  Musculoskeletal:         General: Tenderness present  Cervical back: Neck supple  Feet:      Right foot:      Skin integrity: No ulcer, skin breakdown, erythema, warmth, callus or dry skin  Left foot:      Skin integrity: No ulcer, skin breakdown, erythema, warmth, callus or dry skin  Skin:     General: Skin is warm and dry  Coloration: Skin is not jaundiced  Findings: No rash  Neurological:      General: No focal deficit present  Mental Status: She is alert and oriented to person, place, and time  Cranial Nerves: No cranial nerve deficit     Psychiatric:         Mood and Affect: Mood normal          Behavior: Behavior normal          Diabetic Foot Exam    Patient's shoes and socks removed  Right Foot/Ankle   Right Foot Inspection  Skin Exam: No dry skin, no warmth, no callus, no erythema, no maceration, no ulcer and no callus  Sensory   Monofilament testing: intact    Vascular  The right DP pulse is 2+  The right PT pulse is 2+  Left Foot/Ankle  Left Foot Inspection  Skin Exam: No dry skin, no warmth, no erythema, no maceration, no ulcer and no callus  Sensory   Monofilament testing: intact    Vascular  The left DP pulse is 2+  The left PT pulse is 2+       Assign Risk Category  No deformity present  No loss of protective sensation  No weak pulses           Reggie Pollock MD

## 2022-07-24 NOTE — ASSESSMENT & PLAN NOTE
Lab Results   Component Value Date    HGBA1C 9 9 (H) 06/20/2022   uncontrolled  Encouraged compliance w diet  Inc activity as tolerated  Add adtl DM med    Addendum:  Januvia and Rybelsus not covered  rx for amaryl sent to pharm  Cont t monitor blood sugars  Rpt hga1c in 3 mths

## 2022-07-26 ENCOUNTER — TELEPHONE (OUTPATIENT)
Dept: FAMILY MEDICINE CLINIC | Facility: CLINIC | Age: 87
End: 2022-07-26

## 2022-07-26 NOTE — TELEPHONE ENCOUNTER
Dr Shyam Sosa    Patient's daughter wants to know if glimepiride is replacing metformin or if she is to take with metformin  Please advise

## 2022-08-25 ENCOUNTER — FOLLOW UP (OUTPATIENT)
Dept: URBAN - METROPOLITAN AREA CLINIC 27 | Facility: CLINIC | Age: 87
End: 2022-08-25

## 2022-08-25 DIAGNOSIS — H43.823: ICD-10-CM

## 2022-08-25 DIAGNOSIS — H35.3231: ICD-10-CM

## 2022-08-25 DIAGNOSIS — E11.9: ICD-10-CM

## 2022-08-25 PROCEDURE — 92134 CPTRZ OPH DX IMG PST SGM RTA: CPT

## 2022-08-25 PROCEDURE — 92012 INTRM OPH EXAM EST PATIENT: CPT

## 2022-08-25 ASSESSMENT — TONOMETRY
OS_IOP_MMHG: 18
OD_IOP_MMHG: 18

## 2022-08-25 ASSESSMENT — VISUAL ACUITY
OD_SC: 20/80-2
OS_SC: 20/50-2

## 2022-10-04 ENCOUNTER — RA CDI HCC (OUTPATIENT)
Dept: OTHER | Facility: HOSPITAL | Age: 87
End: 2022-10-04

## 2022-10-04 NOTE — PROGRESS NOTES
[address]  coding opportunities       Chart reviewed, no opportunity found: CHART REVIEWED, NO OPPORTUNITY FOUND        Patients Insurance     Medicare Insurance: Medicare

## 2022-10-11 ENCOUNTER — OFFICE VISIT (OUTPATIENT)
Dept: FAMILY MEDICINE CLINIC | Facility: CLINIC | Age: 87
End: 2022-10-11

## 2022-10-11 VITALS
SYSTOLIC BLOOD PRESSURE: 136 MMHG | BODY MASS INDEX: 27.78 KG/M2 | TEMPERATURE: 98.3 F | HEIGHT: 59 IN | RESPIRATION RATE: 16 BRPM | WEIGHT: 137.8 LBS | HEART RATE: 70 BPM | DIASTOLIC BLOOD PRESSURE: 68 MMHG

## 2022-10-11 DIAGNOSIS — E03.9 HYPOTHYROIDISM, UNSPECIFIED TYPE: ICD-10-CM

## 2022-10-11 DIAGNOSIS — E11.65 TYPE 2 DIABETES MELLITUS WITH HYPERGLYCEMIA, WITHOUT LONG-TERM CURRENT USE OF INSULIN (HCC): Primary | ICD-10-CM

## 2022-10-11 DIAGNOSIS — E78.2 MIXED HYPERLIPIDEMIA: ICD-10-CM

## 2022-10-11 DIAGNOSIS — M17.0 BILATERAL PRIMARY OSTEOARTHRITIS OF KNEE: ICD-10-CM

## 2022-10-11 LAB — SL AMB POCT HEMOGLOBIN AIC: 8 (ref ?–6.5)

## 2022-10-12 PROBLEM — Z00.00 MEDICARE ANNUAL WELLNESS VISIT, SUBSEQUENT: Status: RESOLVED | Noted: 2020-12-03 | Resolved: 2022-10-12

## 2022-10-22 DIAGNOSIS — E11.65 TYPE 2 DIABETES MELLITUS WITH HYPERGLYCEMIA, WITHOUT LONG-TERM CURRENT USE OF INSULIN (HCC): Primary | ICD-10-CM

## 2022-10-25 DIAGNOSIS — E11.65 TYPE 2 DIABETES MELLITUS WITH HYPERGLYCEMIA, WITHOUT LONG-TERM CURRENT USE OF INSULIN (HCC): ICD-10-CM

## 2022-10-27 ENCOUNTER — FOLLOW UP (OUTPATIENT)
Dept: URBAN - METROPOLITAN AREA CLINIC 27 | Facility: CLINIC | Age: 87
End: 2022-10-27

## 2022-10-27 DIAGNOSIS — H35.3231: ICD-10-CM

## 2022-10-27 DIAGNOSIS — H43.823: ICD-10-CM

## 2022-10-27 DIAGNOSIS — H31.8: ICD-10-CM

## 2022-10-27 DIAGNOSIS — Z96.1: ICD-10-CM

## 2022-10-27 DIAGNOSIS — E11.9: ICD-10-CM

## 2022-10-27 PROCEDURE — 92014 COMPRE OPH EXAM EST PT 1/>: CPT | Mod: 25

## 2022-10-27 PROCEDURE — P EYLEA PFS

## 2022-10-27 PROCEDURE — 92134 CPTRZ OPH DX IMG PST SGM RTA: CPT

## 2022-10-27 PROCEDURE — 67028 INJECTION EYE DRUG: CPT

## 2022-10-27 ASSESSMENT — VISUAL ACUITY
OD_CC: 20/80-2
OS_CC: 20/50

## 2022-10-27 ASSESSMENT — TONOMETRY
OS_IOP_MMHG: 21
OD_IOP_MMHG: 21

## 2022-11-13 NOTE — PROGRESS NOTES
Assessment/Plan:    1  Type 2 diabetes mellitus with hyperglycemia, without long-term current use of insulin (HCC)  Assessment & Plan:    Lab Results   Component Value Date    HGBA1C 8 0 (A) 10/11/2022   +improvement  Encouraged continued compliance w ADA diet and metformin use  Maintain yearly eye exams  Daily foot care    Orders:  -     POCT hemoglobin A1c    2  Hypothyroidism, unspecified type  Assessment & Plan:  Last TSH wnl--cont current mgt      3  Mixed hyperlipidemia  Assessment & Plan:  Maintain euthyroid state, low chol diet      4  Bilateral primary osteoarthritis of knee  Assessment & Plan:  Tylenol/analgesic cream prn      5  BMI 27 0-27 9,adult         Subjective:      Patient ID: Chris Awan is a 80 y o  female  Chief Complaint   Patient presents with   • Follow-up     3month check for dm , pt did not start new medication that you prescribed tc/cma       HPI  In w dtr for follow-up  Interval hx reviewed  Never started either of new DM meds given after last visit  Still only taking the metformin  Did make some lifestyle changes w diet/activity  QpH7Z=4% (down from 9 9%)  No acute c/o  The following portions of the patient's history were reviewed and updated as appropriate: allergies, current medications, past family history, past medical history, past social history, past surgical history and problem list     Review of Systems   Constitutional: Negative  Respiratory: Negative  Cardiovascular: Negative  Gastrointestinal: Negative  Endocrine:        DM   Musculoskeletal: Positive for arthralgias  Skin: Negative  Allergic/Immunologic: Positive for environmental allergies  Psychiatric/Behavioral: The patient is nervous/anxious            Current Outpatient Medications   Medication Sig Dispense Refill   • levothyroxine (Euthyrox) 25 mcg tablet Take 1 tablet (25 mcg total) by mouth every morning 90 tablet 1   • metFORMIN (GLUCOPHAGE) 1000 MG tablet Take 1 tablet (1,000 mg total) by mouth daily with breakfast 90 tablet 3     No current facility-administered medications for this visit  Objective:    /68 (BP Location: Left arm, Patient Position: Standing, Cuff Size: Large)   Pulse 70   Temp 98 3 °F (36 8 °C)   Resp 16   Ht 4' 11" (1 499 m)   Wt 62 5 kg (137 lb 12 8 oz)   BMI 27 83 kg/m²        Physical Exam  Vitals and nursing note reviewed  Constitutional:       General: She is not in acute distress  Appearance: Normal appearance  HENT:      Head: Normocephalic and atraumatic  Eyes:      General: No scleral icterus  Conjunctiva/sclera: Conjunctivae normal    Cardiovascular:      Rate and Rhythm: Normal rate and regular rhythm  Pulses: no weak pulses          Dorsalis pedis pulses are 2+ on the right side and 2+ on the left side  Posterior tibial pulses are 2+ on the right side and 2+ on the left side  Pulmonary:      Effort: Pulmonary effort is normal  No respiratory distress  Breath sounds: Normal breath sounds  Abdominal:      General: Bowel sounds are normal       Palpations: Abdomen is soft  Tenderness: There is no abdominal tenderness  Musculoskeletal:         General: Tenderness present  Cervical back: Neck supple  Right lower leg: No edema  Left lower leg: No edema  Feet:      Right foot:      Skin integrity: No ulcer, skin breakdown, erythema, warmth, callus or dry skin  Left foot:      Skin integrity: No ulcer, skin breakdown, erythema, warmth, callus or dry skin  Skin:     General: Skin is warm and dry  Coloration: Skin is not jaundiced  Findings: No rash  Neurological:      General: No focal deficit present  Mental Status: She is alert and oriented to person, place, and time  Cranial Nerves: No cranial nerve deficit  Psychiatric:         Mood and Affect: Mood normal          Behavior: Behavior normal          Diabetic Foot Exam    Patient's shoes and socks removed      Right Foot/Ankle   Right Foot Inspection  Skin Exam: skin normal and skin intact  No dry skin, no warmth, no callus, no erythema, no maceration, no abnormal color, no pre-ulcer, no ulcer and no callus  Toe Exam: ROM and strength within normal limits  Sensory   Vibration: intact  Monofilament testing: intact    Vascular  Capillary refills: < 3 seconds  The right DP pulse is 2+  The right PT pulse is 2+  Left Foot/Ankle  Left Foot Inspection  Skin Exam: skin normal and skin intact  No dry skin, no warmth, no erythema, no maceration, normal color, no pre-ulcer, no ulcer and no callus  Toe Exam: ROM and strength within normal limits  Sensory   Vibration: intact  Monofilament testing: intact    Vascular  Capillary refills: < 3 seconds  The left DP pulse is 2+  The left PT pulse is 2+       Assign Risk Category  No deformity present  No loss of protective sensation  No weak pulses  Risk: 0        Tana Baumann MD

## 2022-11-13 NOTE — ASSESSMENT & PLAN NOTE
Lab Results   Component Value Date    HGBA1C 8 0 (A) 10/11/2022   +improvement  Encouraged continued compliance w ADA diet and metformin use  Maintain yearly eye exams    Daily foot care

## 2023-01-01 ENCOUNTER — TELEPHONE (OUTPATIENT)
Dept: NEPHROLOGY | Facility: CLINIC | Age: 88
End: 2023-01-01

## 2023-01-01 ENCOUNTER — TELEPHONE (OUTPATIENT)
Dept: HEMATOLOGY ONCOLOGY | Facility: CLINIC | Age: 88
End: 2023-01-01

## 2023-01-01 ENCOUNTER — APPOINTMENT (INPATIENT)
Dept: NON INVASIVE DIAGNOSTICS | Facility: HOSPITAL | Age: 88
DRG: 683 | End: 2023-01-01
Payer: MEDICARE

## 2023-01-01 ENCOUNTER — APPOINTMENT (INPATIENT)
Dept: RADIOLOGY | Facility: HOSPITAL | Age: 88
DRG: 814 | End: 2023-01-01
Payer: MEDICARE

## 2023-01-01 ENCOUNTER — TELEMEDICINE (OUTPATIENT)
Dept: FAMILY MEDICINE CLINIC | Facility: CLINIC | Age: 88
End: 2023-01-01
Payer: MEDICARE

## 2023-01-01 ENCOUNTER — HOSPITAL ENCOUNTER (INPATIENT)
Facility: HOSPITAL | Age: 88
LOS: 4 days | Discharge: HOME HEALTH | DRG: 814 | End: 2023-07-13
Attending: EMERGENCY MEDICINE | Admitting: STUDENT IN AN ORGANIZED HEALTH CARE EDUCATION/TRAINING PROGRAM
Payer: MEDICARE

## 2023-01-01 ENCOUNTER — HOSPITAL ENCOUNTER (INPATIENT)
Facility: HOSPITAL | Age: 88
LOS: 1 days | DRG: 683 | End: 2023-07-22
Attending: EMERGENCY MEDICINE | Admitting: INTERNAL MEDICINE
Payer: MEDICARE

## 2023-01-01 ENCOUNTER — APPOINTMENT (EMERGENCY)
Dept: RADIOLOGY | Facility: HOSPITAL | Age: 88
DRG: 814 | End: 2023-01-01
Payer: MEDICARE

## 2023-01-01 ENCOUNTER — APPOINTMENT (INPATIENT)
Dept: RADIOLOGY | Facility: HOSPITAL | Age: 88
DRG: 683 | End: 2023-01-01
Payer: MEDICARE

## 2023-01-01 ENCOUNTER — TELEPHONE (OUTPATIENT)
Dept: FAMILY MEDICINE CLINIC | Facility: CLINIC | Age: 88
End: 2023-01-01

## 2023-01-01 VITALS
BODY MASS INDEX: 24.44 KG/M2 | SYSTOLIC BLOOD PRESSURE: 126 MMHG | OXYGEN SATURATION: 96 % | HEIGHT: 59 IN | TEMPERATURE: 97.6 F | RESPIRATION RATE: 17 BRPM | HEART RATE: 83 BPM | WEIGHT: 121.2 LBS | DIASTOLIC BLOOD PRESSURE: 73 MMHG

## 2023-01-01 VITALS — DIASTOLIC BLOOD PRESSURE: 57 MMHG | SYSTOLIC BLOOD PRESSURE: 121 MMHG | HEART RATE: 88 BPM | OXYGEN SATURATION: 95 %

## 2023-01-01 VITALS
HEIGHT: 59 IN | DIASTOLIC BLOOD PRESSURE: 46 MMHG | WEIGHT: 121 LBS | TEMPERATURE: 94.5 F | SYSTOLIC BLOOD PRESSURE: 80 MMHG | BODY MASS INDEX: 24.39 KG/M2 | RESPIRATION RATE: 15 BRPM | OXYGEN SATURATION: 92 % | HEART RATE: 67 BPM

## 2023-01-01 DIAGNOSIS — N17.9 ACUTE RENAL FAILURE (ARF) (HCC): ICD-10-CM

## 2023-01-01 DIAGNOSIS — N18.9 ACUTE KIDNEY INJURY SUPERIMPOSED ON CKD: Primary | ICD-10-CM

## 2023-01-01 DIAGNOSIS — E03.2 HYPOTHYROIDISM DUE TO MEDICATION: ICD-10-CM

## 2023-01-01 DIAGNOSIS — S22.060A COMPRESSION FRACTURE OF T8 VERTEBRA (HCC): ICD-10-CM

## 2023-01-01 DIAGNOSIS — E11.65 TYPE 2 DIABETES MELLITUS WITH HYPERGLYCEMIA, WITHOUT LONG-TERM CURRENT USE OF INSULIN (HCC): Primary | ICD-10-CM

## 2023-01-01 DIAGNOSIS — E87.1 HYPONATREMIA: Primary | ICD-10-CM

## 2023-01-01 DIAGNOSIS — N18.4 CHRONIC RENAL DISEASE, STAGE IV (HCC): ICD-10-CM

## 2023-01-01 DIAGNOSIS — M54.9 BACKACHE SYMPTOM: ICD-10-CM

## 2023-01-01 DIAGNOSIS — M25.512 BILATERAL SHOULDER PAIN, UNSPECIFIED CHRONICITY: Primary | ICD-10-CM

## 2023-01-01 DIAGNOSIS — E11.65 TYPE 2 DIABETES MELLITUS WITH HYPERGLYCEMIA, WITHOUT LONG-TERM CURRENT USE OF INSULIN (HCC): ICD-10-CM

## 2023-01-01 DIAGNOSIS — M15.9 PRIMARY OSTEOARTHRITIS INVOLVING MULTIPLE JOINTS: ICD-10-CM

## 2023-01-01 DIAGNOSIS — N17.9 ACUTE KIDNEY INJURY SUPERIMPOSED ON CKD (HCC): ICD-10-CM

## 2023-01-01 DIAGNOSIS — N18.9 ACUTE KIDNEY INJURY SUPERIMPOSED ON CKD (HCC): ICD-10-CM

## 2023-01-01 DIAGNOSIS — B96.89 UTI DUE TO KLEBSIELLA SPECIES: Primary | ICD-10-CM

## 2023-01-01 DIAGNOSIS — Z13.0 SCREENING FOR DEFICIENCY ANEMIA: ICD-10-CM

## 2023-01-01 DIAGNOSIS — I21.4 NSTEMI (NON-ST ELEVATED MYOCARDIAL INFARCTION) (HCC): ICD-10-CM

## 2023-01-01 DIAGNOSIS — K59.00 CONSTIPATION: ICD-10-CM

## 2023-01-01 DIAGNOSIS — E53.8 FOLIC ACID DEFICIENCY: ICD-10-CM

## 2023-01-01 DIAGNOSIS — N39.0 UTI DUE TO KLEBSIELLA SPECIES: Primary | ICD-10-CM

## 2023-01-01 DIAGNOSIS — D64.9 ANEMIA: ICD-10-CM

## 2023-01-01 DIAGNOSIS — M25.511 BILATERAL SHOULDER PAIN, UNSPECIFIED CHRONICITY: Primary | ICD-10-CM

## 2023-01-01 DIAGNOSIS — R76.9 ABNORMAL SERUM IMMUNOELECTROPHORESIS: ICD-10-CM

## 2023-01-01 DIAGNOSIS — N17.9 ACUTE KIDNEY INJURY SUPERIMPOSED ON CKD: Primary | ICD-10-CM

## 2023-01-01 DIAGNOSIS — E03.9 HYPOTHYROIDISM, UNSPECIFIED TYPE: ICD-10-CM

## 2023-01-01 DIAGNOSIS — S22.060S COMPRESSION FRACTURE OF T8 VERTEBRA, SEQUELA: ICD-10-CM

## 2023-01-01 DIAGNOSIS — S22.060A COMPRESSION FRACTURE OF T8 VERTEBRA, INITIAL ENCOUNTER (HCC): ICD-10-CM

## 2023-01-01 DIAGNOSIS — N39.0 RECURRENT UTI: Primary | ICD-10-CM

## 2023-01-01 DIAGNOSIS — D89.89 LIGHT CHAIN DISEASE (HCC): ICD-10-CM

## 2023-01-01 DIAGNOSIS — R33.9 URINARY RETENTION: ICD-10-CM

## 2023-01-01 DIAGNOSIS — E78.2 MIXED HYPERLIPIDEMIA: ICD-10-CM

## 2023-01-01 DIAGNOSIS — N19 RENAL FAILURE: Primary | ICD-10-CM

## 2023-01-01 DIAGNOSIS — S22.000A COMPRESSION FRACTURE OF BODY OF THORACIC VERTEBRA (HCC): ICD-10-CM

## 2023-01-01 LAB
25(OH)D3 SERPL-MCNC: 28.2 NG/ML (ref 30–100)
2HR DELTA HS TROPONIN: -17 NG/L
4HR DELTA HS TROPONIN: -34 NG/L
ABO GROUP BLD BPU: NORMAL
ABO GROUP BLD: NORMAL
ABO GROUP BLD: NORMAL
ALBUMIN SERPL BCP-MCNC: 3.3 G/DL (ref 3.5–5)
ALBUMIN SERPL BCP-MCNC: 3.5 G/DL (ref 3.5–5)
ALBUMIN SERPL BCP-MCNC: 3.6 G/DL (ref 3.5–5)
ALBUMIN SERPL BCP-MCNC: 4.3 G/DL (ref 3.5–5)
ALBUMIN SERPL ELPH-MCNC: 4.26 G/DL (ref 3.2–5.1)
ALBUMIN SERPL ELPH-MCNC: 64.5 % (ref 48–70)
ALP SERPL-CCNC: 39 U/L (ref 34–104)
ALP SERPL-CCNC: 39 U/L (ref 34–104)
ALP SERPL-CCNC: 40 U/L (ref 34–104)
ALP SERPL-CCNC: 45 U/L (ref 34–104)
ALP SERPL-CCNC: 49 U/L (ref 34–104)
ALP SERPL-CCNC: 57 U/L (ref 34–104)
ALPHA1 GLOB SERPL ELPH-MCNC: 0.38 G/DL (ref 0.15–0.47)
ALPHA1 GLOB SERPL ELPH-MCNC: 5.8 % (ref 1.8–7)
ALPHA2 GLOB SERPL ELPH-MCNC: 0.71 G/DL (ref 0.42–1.04)
ALPHA2 GLOB SERPL ELPH-MCNC: 10.8 % (ref 5.9–14.9)
ALT SERPL W P-5'-P-CCNC: 10 U/L (ref 7–52)
ALT SERPL W P-5'-P-CCNC: 12 U/L (ref 7–52)
ALT SERPL W P-5'-P-CCNC: 13 U/L (ref 7–52)
ALT SERPL W P-5'-P-CCNC: 13 U/L (ref 7–52)
ALT SERPL W P-5'-P-CCNC: 14 U/L (ref 7–52)
ALT SERPL W P-5'-P-CCNC: 17 U/L (ref 7–52)
AMORPH URATE CRY URNS QL MICRO: ABNORMAL /HPF
ANA SER QL IA: NEGATIVE
ANION GAP SERPL CALCULATED.3IONS-SCNC: 11 MMOL/L
ANION GAP SERPL CALCULATED.3IONS-SCNC: 11 MMOL/L
ANION GAP SERPL CALCULATED.3IONS-SCNC: 13 MMOL/L
ANION GAP SERPL CALCULATED.3IONS-SCNC: 13 MMOL/L
ANION GAP SERPL CALCULATED.3IONS-SCNC: 14 MMOL/L
ANION GAP SERPL CALCULATED.3IONS-SCNC: 15 MMOL/L
ANION GAP SERPL CALCULATED.3IONS-SCNC: 16 MMOL/L
ANION GAP SERPL CALCULATED.3IONS-SCNC: 20 MMOL/L
AORTIC ROOT: 2.9 CM
APICAL FOUR CHAMBER EJECTION FRACTION: 48 %
AST SERPL W P-5'-P-CCNC: 12 U/L (ref 13–39)
AST SERPL W P-5'-P-CCNC: 13 U/L (ref 13–39)
AST SERPL W P-5'-P-CCNC: 14 U/L (ref 13–39)
AST SERPL W P-5'-P-CCNC: 15 U/L (ref 13–39)
AST SERPL W P-5'-P-CCNC: 19 U/L (ref 13–39)
AST SERPL W P-5'-P-CCNC: 23 U/L (ref 13–39)
ATRIAL RATE: 106 BPM
BACTERIA UR QL AUTO: ABNORMAL /HPF
BACTERIA UR QL AUTO: ABNORMAL /HPF
BASE EX.OXY STD BLDV CALC-SCNC: 36.1 % (ref 60–80)
BASE EXCESS BLDV CALC-SCNC: -11.9 MMOL/L
BASOPHILS # BLD AUTO: 0.01 THOUSANDS/ÂΜL (ref 0–0.1)
BASOPHILS # BLD AUTO: 0.02 THOUSANDS/ÂΜL (ref 0–0.1)
BASOPHILS NFR BLD AUTO: 0 % (ref 0–1)
BASOPHILS NFR BLD AUTO: 0 % (ref 0–1)
BETA GLOB ABNORMAL SERPL ELPH-MCNC: 0.25 G/DL (ref 0.31–0.57)
BETA1 GLOB SERPL ELPH-MCNC: 3.8 % (ref 4.7–7.7)
BETA2 GLOB SERPL ELPH-MCNC: 8.8 % (ref 3.1–7.9)
BETA2+GAMMA GLOB SERPL ELPH-MCNC: 0.58 G/DL (ref 0.2–0.58)
BILIRUB SERPL-MCNC: 0.23 MG/DL (ref 0.2–1)
BILIRUB SERPL-MCNC: 0.24 MG/DL (ref 0.2–1)
BILIRUB SERPL-MCNC: 0.29 MG/DL (ref 0.2–1)
BILIRUB SERPL-MCNC: 0.31 MG/DL (ref 0.2–1)
BILIRUB SERPL-MCNC: 0.33 MG/DL (ref 0.2–1)
BILIRUB SERPL-MCNC: 0.34 MG/DL (ref 0.2–1)
BILIRUB UR QL STRIP: NEGATIVE
BILIRUB UR QL STRIP: NEGATIVE
BLD GP AB SCN SERPL QL: NEGATIVE
BPU ID: NORMAL
BUN SERPL-MCNC: 64 MG/DL (ref 5–25)
BUN SERPL-MCNC: 65 MG/DL (ref 5–25)
BUN SERPL-MCNC: 67 MG/DL (ref 5–25)
BUN SERPL-MCNC: 78 MG/DL (ref 5–25)
BUN SERPL-MCNC: 81 MG/DL (ref 5–25)
BUN SERPL-MCNC: 82 MG/DL (ref 5–25)
BUN SERPL-MCNC: 86 MG/DL (ref 5–25)
BUN SERPL-MCNC: 96 MG/DL (ref 5–25)
C-ANCA TITR SER IF: NORMAL TITER
C3 SERPL-MCNC: 79.5 MG/DL (ref 90–180)
C4 SERPL-MCNC: 42 MG/DL (ref 10–40)
CA-I BLD-SCNC: 1.09 MMOL/L (ref 1.12–1.32)
CALCIUM ALBUM COR SERPL-MCNC: 8.1 MG/DL (ref 8.3–10.1)
CALCIUM SERPL-MCNC: 7.5 MG/DL (ref 8.4–10.2)
CALCIUM SERPL-MCNC: 7.7 MG/DL (ref 8.4–10.2)
CALCIUM SERPL-MCNC: 7.7 MG/DL (ref 8.4–10.2)
CALCIUM SERPL-MCNC: 8.5 MG/DL (ref 8.4–10.2)
CALCIUM SERPL-MCNC: 8.6 MG/DL (ref 8.4–10.2)
CALCIUM SERPL-MCNC: 9 MG/DL (ref 8.4–10.2)
CALCIUM SERPL-MCNC: 9.2 MG/DL (ref 8.4–10.2)
CALCIUM SERPL-MCNC: 9.6 MG/DL (ref 8.4–10.2)
CARDIAC TROPONIN I PNL SERPL HS: 159 NG/L
CARDIAC TROPONIN I PNL SERPL HS: 176 NG/L
CARDIAC TROPONIN I PNL SERPL HS: 193 NG/L
CHLORIDE SERPL-SCNC: 107 MMOL/L (ref 96–108)
CHLORIDE SERPL-SCNC: 107 MMOL/L (ref 96–108)
CHLORIDE SERPL-SCNC: 109 MMOL/L (ref 96–108)
CHLORIDE SERPL-SCNC: 109 MMOL/L (ref 96–108)
CHLORIDE SERPL-SCNC: 111 MMOL/L (ref 96–108)
CHLORIDE SERPL-SCNC: 112 MMOL/L (ref 96–108)
CHLORIDE SERPL-SCNC: 112 MMOL/L (ref 96–108)
CHLORIDE SERPL-SCNC: 88 MMOL/L (ref 96–108)
CK SERPL-CCNC: 71 U/L (ref 26–192)
CLARITY UR: ABNORMAL
CLARITY UR: ABNORMAL
CO2 SERPL-SCNC: 13 MMOL/L (ref 21–32)
CO2 SERPL-SCNC: 13 MMOL/L (ref 21–32)
CO2 SERPL-SCNC: 15 MMOL/L (ref 21–32)
CO2 SERPL-SCNC: 16 MMOL/L (ref 21–32)
CO2 SERPL-SCNC: 18 MMOL/L (ref 21–32)
CO2 SERPL-SCNC: 18 MMOL/L (ref 21–32)
CO2 SERPL-SCNC: 19 MMOL/L (ref 21–32)
CO2 SERPL-SCNC: 20 MMOL/L (ref 21–32)
COLOR UR: ABNORMAL
COLOR UR: YELLOW
CREAT SERPL-MCNC: 4.98 MG/DL (ref 0.6–1.3)
CREAT SERPL-MCNC: 5.11 MG/DL (ref 0.6–1.3)
CREAT SERPL-MCNC: 5.27 MG/DL (ref 0.6–1.3)
CREAT SERPL-MCNC: 5.82 MG/DL (ref 0.6–1.3)
CREAT SERPL-MCNC: 5.91 MG/DL (ref 0.6–1.3)
CREAT SERPL-MCNC: 6.02 MG/DL (ref 0.6–1.3)
CREAT SERPL-MCNC: 6.42 MG/DL (ref 0.6–1.3)
CREAT SERPL-MCNC: 8.78 MG/DL (ref 0.6–1.3)
CREAT UR-MCNC: 48.8 MG/DL
CROSSMATCH: NORMAL
DME PARACHUTE DELIVERY DATE ACTUAL: NORMAL
DME PARACHUTE DELIVERY DATE REQUESTED: NORMAL
DME PARACHUTE DELIVERY DATE REQUESTED: NORMAL
DME PARACHUTE ITEM DESCRIPTION: NORMAL
DME PARACHUTE ITEM DESCRIPTION: NORMAL
DME PARACHUTE ORDER STATUS: NORMAL
DME PARACHUTE ORDER STATUS: NORMAL
DME PARACHUTE SUPPLIER NAME: NORMAL
DME PARACHUTE SUPPLIER NAME: NORMAL
DME PARACHUTE SUPPLIER PHONE: NORMAL
DME PARACHUTE SUPPLIER PHONE: NORMAL
EOSINOPHIL # BLD AUTO: 0.06 THOUSAND/ÂΜL (ref 0–0.61)
EOSINOPHIL # BLD AUTO: 0.15 THOUSAND/ÂΜL (ref 0–0.61)
EOSINOPHIL NFR BLD AUTO: 1 % (ref 0–6)
EOSINOPHIL NFR BLD AUTO: 2 % (ref 0–6)
ERYTHROCYTE [DISTWIDTH] IN BLOOD BY AUTOMATED COUNT: 14.6 % (ref 11.6–15.1)
ERYTHROCYTE [DISTWIDTH] IN BLOOD BY AUTOMATED COUNT: 14.7 % (ref 11.6–15.1)
ERYTHROCYTE [DISTWIDTH] IN BLOOD BY AUTOMATED COUNT: 14.8 % (ref 11.6–15.1)
ERYTHROCYTE [DISTWIDTH] IN BLOOD BY AUTOMATED COUNT: 15.3 % (ref 11.6–15.1)
ERYTHROCYTE [DISTWIDTH] IN BLOOD BY AUTOMATED COUNT: 15.7 % (ref 11.6–15.1)
EST. AVERAGE GLUCOSE BLD GHB EST-MCNC: 157 MG/DL
FERRITIN SERPL-MCNC: 229 NG/ML (ref 11–307)
FINE GRAN CASTS URNS QL MICRO: ABNORMAL /LPF
FOLATE SERPL-MCNC: 4.5 NG/ML
FRACTIONAL SHORTENING: 30 (ref 28–44)
GAMMA GLOB ABNORMAL SERPL ELPH-MCNC: 0.42 G/DL (ref 0.4–1.66)
GAMMA GLOB SERPL ELPH-MCNC: 6.3 % (ref 6.9–22.3)
GBM AB SER IA-ACNC: <0.2 UNITS (ref 0–0.9)
GBM AB SER IA-ACNC: <0.2 UNITS (ref 0–0.9)
GFR SERPL CREATININE-BSD FRML MDRD: 3 ML/MIN/1.73SQ M
GFR SERPL CREATININE-BSD FRML MDRD: 5 ML/MIN/1.73SQ M
GFR SERPL CREATININE-BSD FRML MDRD: 6 ML/MIN/1.73SQ M
GFR SERPL CREATININE-BSD FRML MDRD: 6 ML/MIN/1.73SQ M
GFR SERPL CREATININE-BSD FRML MDRD: 7 ML/MIN/1.73SQ M
GLUCOSE SERPL-MCNC: 100 MG/DL (ref 65–140)
GLUCOSE SERPL-MCNC: 101 MG/DL (ref 65–140)
GLUCOSE SERPL-MCNC: 103 MG/DL (ref 65–140)
GLUCOSE SERPL-MCNC: 108 MG/DL (ref 65–140)
GLUCOSE SERPL-MCNC: 116 MG/DL (ref 65–140)
GLUCOSE SERPL-MCNC: 118 MG/DL (ref 65–140)
GLUCOSE SERPL-MCNC: 125 MG/DL (ref 65–140)
GLUCOSE SERPL-MCNC: 139 MG/DL (ref 65–140)
GLUCOSE SERPL-MCNC: 145 MG/DL (ref 65–140)
GLUCOSE SERPL-MCNC: 155 MG/DL (ref 65–140)
GLUCOSE SERPL-MCNC: 165 MG/DL (ref 65–140)
GLUCOSE SERPL-MCNC: 169 MG/DL (ref 65–140)
GLUCOSE SERPL-MCNC: 172 MG/DL (ref 65–140)
GLUCOSE SERPL-MCNC: 173 MG/DL (ref 65–140)
GLUCOSE SERPL-MCNC: 181 MG/DL (ref 65–140)
GLUCOSE SERPL-MCNC: 206 MG/DL (ref 65–140)
GLUCOSE SERPL-MCNC: 429 MG/DL (ref 65–140)
GLUCOSE SERPL-MCNC: 60 MG/DL (ref 65–140)
GLUCOSE SERPL-MCNC: 80 MG/DL (ref 65–140)
GLUCOSE SERPL-MCNC: 82 MG/DL (ref 65–140)
GLUCOSE SERPL-MCNC: 83 MG/DL (ref 65–140)
GLUCOSE SERPL-MCNC: 84 MG/DL (ref 65–140)
GLUCOSE SERPL-MCNC: 84 MG/DL (ref 65–140)
GLUCOSE SERPL-MCNC: 86 MG/DL (ref 65–140)
GLUCOSE SERPL-MCNC: 92 MG/DL (ref 65–140)
GLUCOSE SERPL-MCNC: 95 MG/DL (ref 65–140)
GLUCOSE SERPL-MCNC: 95 MG/DL (ref 65–140)
GLUCOSE SERPL-MCNC: 99 MG/DL (ref 65–140)
GLUCOSE UR STRIP-MCNC: NEGATIVE MG/DL
GLUCOSE UR STRIP-MCNC: NEGATIVE MG/DL
HBA1C MFR BLD: 7.1 %
HCO3 BLDV-SCNC: 14.5 MMOL/L (ref 24–30)
HCT VFR BLD AUTO: 21.9 % (ref 34.8–46.1)
HCT VFR BLD AUTO: 22.1 % (ref 34.8–46.1)
HCT VFR BLD AUTO: 22.2 % (ref 34.8–46.1)
HCT VFR BLD AUTO: 24 % (ref 34.8–46.1)
HCT VFR BLD AUTO: 27.1 % (ref 34.8–46.1)
HGB BLD-MCNC: 7.2 G/DL (ref 11.5–15.4)
HGB BLD-MCNC: 7.2 G/DL (ref 11.5–15.4)
HGB BLD-MCNC: 7.4 G/DL (ref 11.5–15.4)
HGB BLD-MCNC: 8.1 G/DL (ref 11.5–15.4)
HGB BLD-MCNC: 8.8 G/DL (ref 11.5–15.4)
HGB UR QL STRIP.AUTO: ABNORMAL
HGB UR QL STRIP.AUTO: ABNORMAL
IGG/ALB SER: 1.82 {RATIO} (ref 1.1–1.8)
IMM GRANULOCYTES # BLD AUTO: 0.07 THOUSAND/UL (ref 0–0.2)
IMM GRANULOCYTES # BLD AUTO: 0.08 THOUSAND/UL (ref 0–0.2)
IMM GRANULOCYTES NFR BLD AUTO: 1 % (ref 0–2)
IMM GRANULOCYTES NFR BLD AUTO: 1 % (ref 0–2)
INTERPRETATION UR IFE-IMP: NORMAL
INTERVENTRICULAR SEPTUM IN DIASTOLE (PARASTERNAL SHORT AXIS VIEW): 1.1 CM
INTERVENTRICULAR SEPTUM: 1.1 CM (ref 0.6–1.1)
IRON SATN MFR SERPL: 42 % (ref 15–50)
IRON SERPL-MCNC: 90 UG/DL (ref 50–170)
KAPPA LC FREE SER-MCNC: 32.9 MG/L (ref 3.3–19.4)
KAPPA LC FREE/LAMBDA FREE SER: 0 {RATIO} (ref 0.26–1.65)
KETONES UR STRIP-MCNC: NEGATIVE MG/DL
KETONES UR STRIP-MCNC: NEGATIVE MG/DL
LAAS-AP2: 18 CM2
LAAS-AP4: 16.4 CM2
LAMBDA LC FREE SERPL-MCNC: ABNORMAL MG/L (ref 5.7–26.3)
LEFT ATRIUM SIZE: 3 CM
LEFT ATRIUM VOLUME (MOD BIPLANE): 47 ML
LEFT INTERNAL DIMENSION IN SYSTOLE: 3 CM (ref 2.1–4)
LEFT VENTRICULAR INTERNAL DIMENSION IN DIASTOLE: 4.3 CM (ref 3.5–6)
LEFT VENTRICULAR POSTERIOR WALL IN END DIASTOLE: 1 CM
LEFT VENTRICULAR STROKE VOLUME: 51 ML
LEUKOCYTE ESTERASE UR QL STRIP: ABNORMAL
LEUKOCYTE ESTERASE UR QL STRIP: ABNORMAL
LVSV (TEICH): 51 ML
LYMPHOCYTES # BLD AUTO: 1.07 THOUSANDS/ÂΜL (ref 0.6–4.47)
LYMPHOCYTES # BLD AUTO: 1.95 THOUSANDS/ÂΜL (ref 0.6–4.47)
LYMPHOCYTES NFR BLD AUTO: 18 % (ref 14–44)
LYMPHOCYTES NFR BLD AUTO: 28 % (ref 14–44)
M PROTEIN 1 MFR SERPL ELPH: 5.7 %
M PROTEIN 1 SERPL ELPH-MCNC: 0.38 G/DL
MAGNESIUM SERPL-MCNC: 2.5 MG/DL (ref 1.9–2.7)
MCH RBC QN AUTO: 32.7 PG (ref 26.8–34.3)
MCH RBC QN AUTO: 32.7 PG (ref 26.8–34.3)
MCH RBC QN AUTO: 33.3 PG (ref 26.8–34.3)
MCH RBC QN AUTO: 33.8 PG (ref 26.8–34.3)
MCH RBC QN AUTO: 34 PG (ref 26.8–34.3)
MCHC RBC AUTO-ENTMCNC: 32.4 G/DL (ref 31.4–37.4)
MCHC RBC AUTO-ENTMCNC: 32.5 G/DL (ref 31.4–37.4)
MCHC RBC AUTO-ENTMCNC: 32.6 G/DL (ref 31.4–37.4)
MCHC RBC AUTO-ENTMCNC: 33.8 G/DL (ref 31.4–37.4)
MCHC RBC AUTO-ENTMCNC: 33.8 G/DL (ref 31.4–37.4)
MCV RBC AUTO: 100 FL (ref 82–98)
MCV RBC AUTO: 101 FL (ref 82–98)
MCV RBC AUTO: 102 FL (ref 82–98)
MONOCYTES # BLD AUTO: 0.75 THOUSAND/ÂΜL (ref 0.17–1.22)
MONOCYTES # BLD AUTO: 0.85 THOUSAND/ÂΜL (ref 0.17–1.22)
MONOCYTES NFR BLD AUTO: 11 % (ref 4–12)
MONOCYTES NFR BLD AUTO: 14 % (ref 4–12)
MV E'TISSUE VEL-LAT: 19 CM/S
MV E'TISSUE VEL-SEP: 18 CM/S
MV PEAK E VEL: 133 CM/S
MYELOPEROXIDASE AB SER IA-ACNC: <0.2 UNITS (ref 0–0.9)
MYELOPEROXIDASE AB SER IA-ACNC: <0.2 UNITS (ref 0–0.9)
NEUTROPHILS # BLD AUTO: 3.96 THOUSANDS/ÂΜL (ref 1.85–7.62)
NEUTROPHILS # BLD AUTO: 4.05 THOUSANDS/ÂΜL (ref 1.85–7.62)
NEUTS SEG NFR BLD AUTO: 58 % (ref 43–75)
NEUTS SEG NFR BLD AUTO: 66 % (ref 43–75)
NITRITE UR QL STRIP: NEGATIVE
NITRITE UR QL STRIP: NEGATIVE
NON-SQ EPI CELLS URNS QL MICRO: ABNORMAL /HPF
NON-SQ EPI CELLS URNS QL MICRO: ABNORMAL /HPF
NRBC BLD AUTO-RTO: 0 /100 WBCS
NRBC BLD AUTO-RTO: 0 /100 WBCS
O2 CT BLDV-SCNC: 4.2 ML/DL
P-ANCA ATYPICAL TITR SER IF: NORMAL TITER
P-ANCA TITR SER IF: NORMAL TITER
PCO2 BLDV: 34.7 MM HG (ref 42–50)
PH BLDV: 7.24 [PH] (ref 7.3–7.4)
PH UR STRIP.AUTO: 5 [PH]
PH UR STRIP.AUTO: 6 [PH]
PHOSPHATE SERPL-MCNC: 5.4 MG/DL (ref 2.3–4.1)
PLATELET # BLD AUTO: 137 THOUSANDS/UL (ref 149–390)
PLATELET # BLD AUTO: 146 THOUSANDS/UL (ref 149–390)
PLATELET # BLD AUTO: 164 THOUSANDS/UL (ref 149–390)
PLATELET # BLD AUTO: 171 THOUSANDS/UL (ref 149–390)
PLATELET # BLD AUTO: 184 THOUSANDS/UL (ref 149–390)
PLATELET # BLD AUTO: 189 THOUSANDS/UL (ref 149–390)
PLATELET # BLD AUTO: 208 THOUSANDS/UL (ref 149–390)
PMV BLD AUTO: 9.2 FL (ref 8.9–12.7)
PMV BLD AUTO: 9.4 FL (ref 8.9–12.7)
PMV BLD AUTO: 9.4 FL (ref 8.9–12.7)
PMV BLD AUTO: 9.5 FL (ref 8.9–12.7)
PMV BLD AUTO: 9.6 FL (ref 8.9–12.7)
PMV BLD AUTO: 9.8 FL (ref 8.9–12.7)
PMV BLD AUTO: 9.8 FL (ref 8.9–12.7)
PO2 BLDV: 19.3 MM HG (ref 35–45)
POTASSIUM SERPL-SCNC: 4.8 MMOL/L (ref 3.5–5.3)
POTASSIUM SERPL-SCNC: 4.8 MMOL/L (ref 3.5–5.3)
POTASSIUM SERPL-SCNC: 4.9 MMOL/L (ref 3.5–5.3)
POTASSIUM SERPL-SCNC: 5 MMOL/L (ref 3.5–5.3)
POTASSIUM SERPL-SCNC: 5.1 MMOL/L (ref 3.5–5.3)
POTASSIUM SERPL-SCNC: 5.2 MMOL/L (ref 3.5–5.3)
POTASSIUM SERPL-SCNC: 5.9 MMOL/L (ref 3.5–5.3)
POTASSIUM SERPL-SCNC: 6.3 MMOL/L (ref 3.5–5.3)
PR INTERVAL: 200 MS
PROCALCITONIN SERPL-MCNC: 0.47 NG/ML
PROT PATTERN SERPL ELPH-IMP: ABNORMAL
PROT SERPL-MCNC: 5.3 G/DL (ref 6.4–8.4)
PROT SERPL-MCNC: 5.7 G/DL (ref 6.4–8.4)
PROT SERPL-MCNC: 5.7 G/DL (ref 6.4–8.4)
PROT SERPL-MCNC: 5.9 G/DL (ref 6.4–8.4)
PROT SERPL-MCNC: 6.5 G/DL (ref 6.4–8.4)
PROT SERPL-MCNC: 6.6 G/DL (ref 6.4–8.2)
PROT SERPL-MCNC: 7.2 G/DL (ref 6.4–8.4)
PROT UR STRIP-MCNC: ABNORMAL MG/DL
PROT UR STRIP-MCNC: ABNORMAL MG/DL
PROT UR-MCNC: 399 MG/DL
PROT/CREAT UR: 8.18 MG/G{CREAT} (ref 0–0.1)
PROTEINASE3 AB SER IA-ACNC: <0.2 UNITS (ref 0–0.9)
PROTEINASE3 AB SER IA-ACNC: <0.2 UNITS (ref 0–0.9)
QRS AXIS: 257 DEGREES
QRSD INTERVAL: 134 MS
QT INTERVAL: 388 MS
QTC INTERVAL: 515 MS
RBC # BLD AUTO: 2.16 MILLION/UL (ref 3.81–5.12)
RBC # BLD AUTO: 2.19 MILLION/UL (ref 3.81–5.12)
RBC # BLD AUTO: 2.2 MILLION/UL (ref 3.81–5.12)
RBC # BLD AUTO: 2.38 MILLION/UL (ref 3.81–5.12)
RBC # BLD AUTO: 2.69 MILLION/UL (ref 3.81–5.12)
RBC #/AREA URNS AUTO: ABNORMAL /HPF
RBC #/AREA URNS AUTO: ABNORMAL /HPF
RH BLD: POSITIVE
RH BLD: POSITIVE
RIGHT ATRIUM AREA SYSTOLE A4C: 13.3 CM2
RIGHT VENTRICLE ID DIMENSION: 3.7 CM
SL CV LEFT ATRIUM LENGTH A2C: 4.8 CM
SL CV LV EF: 45
SL CV PED ECHO LEFT VENTRICLE DIASTOLIC VOLUME (MOD BIPLANE) 2D: 85 ML
SL CV PED ECHO LEFT VENTRICLE SYSTOLIC VOLUME (MOD BIPLANE) 2D: 34 ML
SODIUM SERPL-SCNC: 126 MMOL/L (ref 135–147)
SODIUM SERPL-SCNC: 138 MMOL/L (ref 135–147)
SODIUM SERPL-SCNC: 139 MMOL/L (ref 135–147)
SODIUM SERPL-SCNC: 140 MMOL/L (ref 135–147)
SODIUM SERPL-SCNC: 140 MMOL/L (ref 135–147)
SODIUM SERPL-SCNC: 141 MMOL/L (ref 135–147)
SP GR UR STRIP.AUTO: 1.02 (ref 1–1.03)
SP GR UR STRIP.AUTO: 1.02 (ref 1–1.03)
SPECIMEN EXPIRATION DATE: NORMAL
T WAVE AXIS: -1 DEGREES
TIBC SERPL-MCNC: 214 UG/DL (ref 250–450)
TR MAX PG: 35 MMHG
TR PEAK VELOCITY: 2.9 M/S
TRICUSPID ANNULAR PLANE SYSTOLIC EXCURSION: 2.2 CM
TRICUSPID VALVE PEAK REGURGITATION VELOCITY: 2.94 M/S
UNIT DISPENSE STATUS: NORMAL
UNIT PRODUCT CODE: NORMAL
UNIT PRODUCT VOLUME: 350 ML
UNIT RH: NORMAL
UROBILINOGEN UR QL STRIP.AUTO: 0.2 E.U./DL
UROBILINOGEN UR QL STRIP.AUTO: 0.2 E.U./DL
VENTRICULAR RATE: 106 BPM
VIT B12 SERPL-MCNC: 196 PG/ML (ref 180–914)
WBC # BLD AUTO: 4.79 THOUSAND/UL (ref 4.31–10.16)
WBC # BLD AUTO: 5.59 THOUSAND/UL (ref 4.31–10.16)
WBC # BLD AUTO: 6.03 THOUSAND/UL (ref 4.31–10.16)
WBC # BLD AUTO: 6.15 THOUSAND/UL (ref 4.31–10.16)
WBC # BLD AUTO: 6.99 THOUSAND/UL (ref 4.31–10.16)
WBC #/AREA URNS AUTO: ABNORMAL /HPF
WBC #/AREA URNS AUTO: ABNORMAL /HPF

## 2023-01-01 PROCEDURE — 97535 SELF CARE MNGMENT TRAINING: CPT

## 2023-01-01 PROCEDURE — 85027 COMPLETE CBC AUTOMATED: CPT | Performed by: FAMILY MEDICINE

## 2023-01-01 PROCEDURE — 84165 PROTEIN E-PHORESIS SERUM: CPT | Performed by: STUDENT IN AN ORGANIZED HEALTH CARE EDUCATION/TRAINING PROGRAM

## 2023-01-01 PROCEDURE — 84484 ASSAY OF TROPONIN QUANT: CPT | Performed by: INTERNAL MEDICINE

## 2023-01-01 PROCEDURE — 80053 COMPREHEN METABOLIC PANEL: CPT | Performed by: EMERGENCY MEDICINE

## 2023-01-01 PROCEDURE — 81001 URINALYSIS AUTO W/SCOPE: CPT | Performed by: EMERGENCY MEDICINE

## 2023-01-01 PROCEDURE — 80053 COMPREHEN METABOLIC PANEL: CPT | Performed by: STUDENT IN AN ORGANIZED HEALTH CARE EDUCATION/TRAINING PROGRAM

## 2023-01-01 PROCEDURE — 71045 X-RAY EXAM CHEST 1 VIEW: CPT

## 2023-01-01 PROCEDURE — 96360 HYDRATION IV INFUSION INIT: CPT

## 2023-01-01 PROCEDURE — 83520 IMMUNOASSAY QUANT NOS NONAB: CPT | Performed by: STUDENT IN AN ORGANIZED HEALTH CARE EDUCATION/TRAINING PROGRAM

## 2023-01-01 PROCEDURE — 99291 CRITICAL CARE FIRST HOUR: CPT | Performed by: EMERGENCY MEDICINE

## 2023-01-01 PROCEDURE — 99232 SBSQ HOSP IP/OBS MODERATE 35: CPT | Performed by: STUDENT IN AN ORGANIZED HEALTH CARE EDUCATION/TRAINING PROGRAM

## 2023-01-01 PROCEDURE — 81001 URINALYSIS AUTO W/SCOPE: CPT | Performed by: STUDENT IN AN ORGANIZED HEALTH CARE EDUCATION/TRAINING PROGRAM

## 2023-01-01 PROCEDURE — 82550 ASSAY OF CK (CPK): CPT | Performed by: INTERNAL MEDICINE

## 2023-01-01 PROCEDURE — 84165 PROTEIN E-PHORESIS SERUM: CPT | Performed by: PATHOLOGY

## 2023-01-01 PROCEDURE — 86901 BLOOD TYPING SEROLOGIC RH(D): CPT | Performed by: EMERGENCY MEDICINE

## 2023-01-01 PROCEDURE — 87147 CULTURE TYPE IMMUNOLOGIC: CPT | Performed by: EMERGENCY MEDICINE

## 2023-01-01 PROCEDURE — 93005 ELECTROCARDIOGRAM TRACING: CPT

## 2023-01-01 PROCEDURE — 97110 THERAPEUTIC EXERCISES: CPT

## 2023-01-01 PROCEDURE — 36415 COLL VENOUS BLD VENIPUNCTURE: CPT | Performed by: EMERGENCY MEDICINE

## 2023-01-01 PROCEDURE — 85025 COMPLETE CBC W/AUTO DIFF WBC: CPT | Performed by: EMERGENCY MEDICINE

## 2023-01-01 PROCEDURE — NC001 PR NO CHARGE: Performed by: HOSPITALIST

## 2023-01-01 PROCEDURE — 74176 CT ABD & PELVIS W/O CONTRAST: CPT

## 2023-01-01 PROCEDURE — 84484 ASSAY OF TROPONIN QUANT: CPT | Performed by: EMERGENCY MEDICINE

## 2023-01-01 PROCEDURE — 82948 REAGENT STRIP/BLOOD GLUCOSE: CPT

## 2023-01-01 PROCEDURE — 82805 BLOOD GASES W/O2 SATURATION: CPT | Performed by: INTERNAL MEDICINE

## 2023-01-01 PROCEDURE — 86037 ANCA TITER EACH ANTIBODY: CPT | Performed by: STUDENT IN AN ORGANIZED HEALTH CARE EDUCATION/TRAINING PROGRAM

## 2023-01-01 PROCEDURE — 82570 ASSAY OF URINE CREATININE: CPT | Performed by: STUDENT IN AN ORGANIZED HEALTH CARE EDUCATION/TRAINING PROGRAM

## 2023-01-01 PROCEDURE — 84156 ASSAY OF PROTEIN URINE: CPT | Performed by: STUDENT IN AN ORGANIZED HEALTH CARE EDUCATION/TRAINING PROGRAM

## 2023-01-01 PROCEDURE — 83735 ASSAY OF MAGNESIUM: CPT | Performed by: STUDENT IN AN ORGANIZED HEALTH CARE EDUCATION/TRAINING PROGRAM

## 2023-01-01 PROCEDURE — 86900 BLOOD TYPING SEROLOGIC ABO: CPT | Performed by: EMERGENCY MEDICINE

## 2023-01-01 PROCEDURE — 86038 ANTINUCLEAR ANTIBODIES: CPT | Performed by: STUDENT IN AN ORGANIZED HEALTH CARE EDUCATION/TRAINING PROGRAM

## 2023-01-01 PROCEDURE — 86334 IMMUNOFIX E-PHORESIS SERUM: CPT | Performed by: PATHOLOGY

## 2023-01-01 PROCEDURE — 99233 SBSQ HOSP IP/OBS HIGH 50: CPT | Performed by: STUDENT IN AN ORGANIZED HEALTH CARE EDUCATION/TRAINING PROGRAM

## 2023-01-01 PROCEDURE — 83036 HEMOGLOBIN GLYCOSYLATED A1C: CPT | Performed by: STUDENT IN AN ORGANIZED HEALTH CARE EDUCATION/TRAINING PROGRAM

## 2023-01-01 PROCEDURE — 82330 ASSAY OF CALCIUM: CPT | Performed by: INTERNAL MEDICINE

## 2023-01-01 PROCEDURE — 97530 THERAPEUTIC ACTIVITIES: CPT

## 2023-01-01 PROCEDURE — 99223 1ST HOSP IP/OBS HIGH 75: CPT | Performed by: STUDENT IN AN ORGANIZED HEALTH CARE EDUCATION/TRAINING PROGRAM

## 2023-01-01 PROCEDURE — 80048 BASIC METABOLIC PNL TOTAL CA: CPT | Performed by: STUDENT IN AN ORGANIZED HEALTH CARE EDUCATION/TRAINING PROGRAM

## 2023-01-01 PROCEDURE — 99285 EMERGENCY DEPT VISIT HI MDM: CPT

## 2023-01-01 PROCEDURE — 86850 RBC ANTIBODY SCREEN: CPT | Performed by: EMERGENCY MEDICINE

## 2023-01-01 PROCEDURE — 85049 AUTOMATED PLATELET COUNT: CPT | Performed by: STUDENT IN AN ORGANIZED HEALTH CARE EDUCATION/TRAINING PROGRAM

## 2023-01-01 PROCEDURE — 99284 EMERGENCY DEPT VISIT MOD MDM: CPT

## 2023-01-01 PROCEDURE — 82728 ASSAY OF FERRITIN: CPT | Performed by: FAMILY MEDICINE

## 2023-01-01 PROCEDURE — G1004 CDSM NDSC: HCPCS

## 2023-01-01 PROCEDURE — 82607 VITAMIN B-12: CPT | Performed by: STUDENT IN AN ORGANIZED HEALTH CARE EDUCATION/TRAINING PROGRAM

## 2023-01-01 PROCEDURE — 87086 URINE CULTURE/COLONY COUNT: CPT | Performed by: EMERGENCY MEDICINE

## 2023-01-01 PROCEDURE — 99496 TRANSJ CARE MGMT HIGH F2F 7D: CPT | Performed by: FAMILY MEDICINE

## 2023-01-01 PROCEDURE — 99232 SBSQ HOSP IP/OBS MODERATE 35: CPT | Performed by: FAMILY MEDICINE

## 2023-01-01 PROCEDURE — 83550 IRON BINDING TEST: CPT | Performed by: FAMILY MEDICINE

## 2023-01-01 PROCEDURE — 83540 ASSAY OF IRON: CPT | Performed by: FAMILY MEDICINE

## 2023-01-01 PROCEDURE — 85049 AUTOMATED PLATELET COUNT: CPT | Performed by: INTERNAL MEDICINE

## 2023-01-01 PROCEDURE — 85027 COMPLETE CBC AUTOMATED: CPT | Performed by: STUDENT IN AN ORGANIZED HEALTH CARE EDUCATION/TRAINING PROGRAM

## 2023-01-01 PROCEDURE — 86160 COMPLEMENT ANTIGEN: CPT | Performed by: STUDENT IN AN ORGANIZED HEALTH CARE EDUCATION/TRAINING PROGRAM

## 2023-01-01 PROCEDURE — 83521 IG LIGHT CHAINS FREE EACH: CPT | Performed by: STUDENT IN AN ORGANIZED HEALTH CARE EDUCATION/TRAINING PROGRAM

## 2023-01-01 PROCEDURE — 93306 TTE W/DOPPLER COMPLETE: CPT

## 2023-01-01 PROCEDURE — 99223 1ST HOSP IP/OBS HIGH 75: CPT | Performed by: INTERNAL MEDICINE

## 2023-01-01 PROCEDURE — 84145 PROCALCITONIN (PCT): CPT | Performed by: INTERNAL MEDICINE

## 2023-01-01 PROCEDURE — 87040 BLOOD CULTURE FOR BACTERIA: CPT | Performed by: EMERGENCY MEDICINE

## 2023-01-01 PROCEDURE — 82306 VITAMIN D 25 HYDROXY: CPT | Performed by: STUDENT IN AN ORGANIZED HEALTH CARE EDUCATION/TRAINING PROGRAM

## 2023-01-01 PROCEDURE — 97760 ORTHOTIC MGMT&TRAING 1ST ENC: CPT

## 2023-01-01 PROCEDURE — 97167 OT EVAL HIGH COMPLEX 60 MIN: CPT

## 2023-01-01 PROCEDURE — 97163 PT EVAL HIGH COMPLEX 45 MIN: CPT

## 2023-01-01 PROCEDURE — 99232 SBSQ HOSP IP/OBS MODERATE 35: CPT | Performed by: NURSE PRACTITIONER

## 2023-01-01 PROCEDURE — 82746 ASSAY OF FOLIC ACID SERUM: CPT | Performed by: STUDENT IN AN ORGANIZED HEALTH CARE EDUCATION/TRAINING PROGRAM

## 2023-01-01 PROCEDURE — 99239 HOSP IP/OBS DSCHRG MGMT >30: CPT | Performed by: FAMILY MEDICINE

## 2023-01-01 PROCEDURE — 93010 ELECTROCARDIOGRAM REPORT: CPT | Performed by: INTERNAL MEDICINE

## 2023-01-01 PROCEDURE — 99285 EMERGENCY DEPT VISIT HI MDM: CPT | Performed by: EMERGENCY MEDICINE

## 2023-01-01 PROCEDURE — 93306 TTE W/DOPPLER COMPLETE: CPT | Performed by: INTERNAL MEDICINE

## 2023-01-01 PROCEDURE — 86920 COMPATIBILITY TEST SPIN: CPT

## 2023-01-01 PROCEDURE — 86334 IMMUNOFIX E-PHORESIS SERUM: CPT | Performed by: STUDENT IN AN ORGANIZED HEALTH CARE EDUCATION/TRAINING PROGRAM

## 2023-01-01 PROCEDURE — 84100 ASSAY OF PHOSPHORUS: CPT | Performed by: STUDENT IN AN ORGANIZED HEALTH CARE EDUCATION/TRAINING PROGRAM

## 2023-01-01 PROCEDURE — 72072 X-RAY EXAM THORAC SPINE 3VWS: CPT

## 2023-01-01 PROCEDURE — 80048 BASIC METABOLIC PNL TOTAL CA: CPT | Performed by: INTERNAL MEDICINE

## 2023-01-01 RX ORDER — DOCUSATE SODIUM 100 MG/1
100 CAPSULE, LIQUID FILLED ORAL 2 TIMES DAILY
COMMUNITY

## 2023-01-01 RX ORDER — OXYCODONE HYDROCHLORIDE 5 MG/1
2.5 TABLET ORAL EVERY 8 HOURS PRN
Qty: 6 TABLET | Refills: 0 | Status: SHIPPED | OUTPATIENT
Start: 2023-01-01 | End: 2023-01-01

## 2023-01-01 RX ORDER — ONDANSETRON 2 MG/ML
4 INJECTION INTRAMUSCULAR; INTRAVENOUS EVERY 6 HOURS PRN
Status: DISCONTINUED | OUTPATIENT
Start: 2023-01-01 | End: 2023-01-01 | Stop reason: HOSPADM

## 2023-01-01 RX ORDER — LANOLIN ALCOHOL/MO/W.PET/CERES
1 CREAM (GRAM) TOPICAL 2 TIMES DAILY WITH MEALS
Status: DISCONTINUED | OUTPATIENT
Start: 2023-01-01 | End: 2023-01-01 | Stop reason: HOSPADM

## 2023-01-01 RX ORDER — SODIUM BICARBONATE 650 MG/1
650 TABLET ORAL
Qty: 90 TABLET | Refills: 0 | Status: SHIPPED | OUTPATIENT
Start: 2023-01-01 | End: 2023-07-23 | Stop reason: CLARIF

## 2023-01-01 RX ORDER — SODIUM BICARBONATE 650 MG/1
650 TABLET ORAL
Status: DISCONTINUED | OUTPATIENT
Start: 2023-01-01 | End: 2023-01-01 | Stop reason: HOSPADM

## 2023-01-01 RX ORDER — BLOOD-GLUCOSE METER
KIT MISCELLANEOUS
Qty: 1 KIT | Refills: 0 | Status: SHIPPED | OUTPATIENT
Start: 2023-01-01 | End: 2023-07-23 | Stop reason: CLARIF

## 2023-01-01 RX ORDER — LORAZEPAM 2 MG/ML
0.5 INJECTION INTRAMUSCULAR EVERY 4 HOURS PRN
Status: DISCONTINUED | OUTPATIENT
Start: 2023-01-01 | End: 2023-07-23 | Stop reason: HOSPADM

## 2023-01-01 RX ORDER — OMEGA-3S/DHA/EPA/FISH OIL/D3 300MG-1000
400 CAPSULE ORAL DAILY
Status: DISCONTINUED | OUTPATIENT
Start: 2023-01-01 | End: 2023-01-01

## 2023-01-01 RX ORDER — DEXTROSE MONOHYDRATE 25 G/50ML
25 INJECTION, SOLUTION INTRAVENOUS ONCE
Status: COMPLETED | OUTPATIENT
Start: 2023-01-01 | End: 2023-01-01

## 2023-01-01 RX ORDER — LANOLIN ALCOHOL/MO/W.PET/CERES
1 CREAM (GRAM) TOPICAL 2 TIMES DAILY WITH MEALS
Qty: 60 TABLET | Refills: 0 | Status: SHIPPED | OUTPATIENT
Start: 2023-01-01 | End: 2023-07-23 | Stop reason: CLARIF

## 2023-01-01 RX ORDER — METHOCARBAMOL 500 MG/1
250 TABLET, FILM COATED ORAL EVERY 6 HOURS SCHEDULED
Status: DISCONTINUED | OUTPATIENT
Start: 2023-01-01 | End: 2023-01-01 | Stop reason: HOSPADM

## 2023-01-01 RX ORDER — HYDROMORPHONE HCL/PF 1 MG/ML
0.2 SYRINGE (ML) INJECTION EVERY 4 HOURS PRN
Status: DISCONTINUED | OUTPATIENT
Start: 2023-01-01 | End: 2023-01-01 | Stop reason: HOSPADM

## 2023-01-01 RX ORDER — SODIUM CHLORIDE 9 MG/ML
150 INJECTION, SOLUTION INTRAVENOUS CONTINUOUS
Status: DISCONTINUED | OUTPATIENT
Start: 2023-01-01 | End: 2023-01-01

## 2023-01-01 RX ORDER — ALBUMIN (HUMAN) 12.5 G/50ML
25 SOLUTION INTRAVENOUS ONCE
Status: COMPLETED | OUTPATIENT
Start: 2023-01-01 | End: 2023-01-01

## 2023-01-01 RX ORDER — FOLIC ACID 1 MG/1
1 TABLET ORAL DAILY
Qty: 30 TABLET | Refills: 0 | Status: SHIPPED | OUTPATIENT
Start: 2023-01-01 | End: 2023-07-23 | Stop reason: CLARIF

## 2023-01-01 RX ORDER — LIDOCAINE 50 MG/G
1 PATCH TOPICAL DAILY
Status: DISCONTINUED | OUTPATIENT
Start: 2023-01-01 | End: 2023-01-01 | Stop reason: HOSPADM

## 2023-01-01 RX ORDER — CIPROFLOXACIN 250 MG/1
250 TABLET, FILM COATED ORAL EVERY 12 HOURS SCHEDULED
Qty: 6 TABLET | Refills: 0 | Status: SHIPPED | OUTPATIENT
Start: 2023-01-01 | End: 2023-01-01

## 2023-01-01 RX ORDER — LIDOCAINE 50 MG/G
1 PATCH TOPICAL DAILY
Status: DISCONTINUED | OUTPATIENT
Start: 2023-01-01 | End: 2023-01-01

## 2023-01-01 RX ORDER — CEFTRIAXONE 1 G/50ML
1000 INJECTION, SOLUTION INTRAVENOUS ONCE
Status: DISCONTINUED | OUTPATIENT
Start: 2023-07-23 | End: 2023-01-01

## 2023-01-01 RX ORDER — DOCUSATE SODIUM 100 MG/1
100 CAPSULE, LIQUID FILLED ORAL 2 TIMES DAILY
Status: DISCONTINUED | OUTPATIENT
Start: 2023-01-01 | End: 2023-01-01

## 2023-01-01 RX ORDER — GABAPENTIN 100 MG/1
100 CAPSULE ORAL
Status: DISCONTINUED | OUTPATIENT
Start: 2023-01-01 | End: 2023-01-01 | Stop reason: HOSPADM

## 2023-01-01 RX ORDER — METHOCARBAMOL 500 MG/1
250 TABLET, FILM COATED ORAL EVERY 8 HOURS PRN
Qty: 20 TABLET | Refills: 0 | Status: SHIPPED | OUTPATIENT
Start: 2023-01-01 | End: 2023-07-23 | Stop reason: CLARIF

## 2023-01-01 RX ORDER — LEVOTHYROXINE SODIUM 0.03 MG/1
25 TABLET ORAL
Status: DISCONTINUED | OUTPATIENT
Start: 2023-01-01 | End: 2023-01-01

## 2023-01-01 RX ORDER — ONDANSETRON 4 MG/1
4 TABLET, ORALLY DISINTEGRATING ORAL EVERY 6 HOURS PRN
Qty: 20 TABLET | Refills: 0 | Status: SHIPPED | OUTPATIENT
Start: 2023-01-01 | End: 2023-07-23 | Stop reason: CLARIF

## 2023-01-01 RX ORDER — DOCUSATE SODIUM 100 MG/1
100 CAPSULE, LIQUID FILLED ORAL 2 TIMES DAILY
Status: DISCONTINUED | OUTPATIENT
Start: 2023-01-01 | End: 2023-01-01 | Stop reason: HOSPADM

## 2023-01-01 RX ORDER — SULFAMETHOXAZOLE AND TRIMETHOPRIM 800; 160 MG/1; MG/1
1 TABLET ORAL 2 TIMES DAILY
Qty: 6 TABLET | Refills: 0 | Status: SHIPPED | OUTPATIENT
Start: 2023-01-01 | End: 2023-01-01

## 2023-01-01 RX ORDER — MAGNESIUM 200 MG
1000 TABLET ORAL DAILY
Qty: 30 TABLET | Refills: 0 | Status: SHIPPED | OUTPATIENT
Start: 2023-01-01 | End: 2023-07-23 | Stop reason: CLARIF

## 2023-01-01 RX ORDER — INSULIN LISPRO 100 [IU]/ML
1-5 INJECTION, SOLUTION INTRAVENOUS; SUBCUTANEOUS
Status: DISCONTINUED | OUTPATIENT
Start: 2023-01-01 | End: 2023-01-01

## 2023-01-01 RX ORDER — LIDOCAINE 50 MG/G
1 PATCH TOPICAL DAILY
Qty: 30 PATCH | Refills: 0 | Status: SHIPPED | OUTPATIENT
Start: 2023-01-01 | End: 2023-07-23 | Stop reason: CLARIF

## 2023-01-01 RX ORDER — ALBUMIN (HUMAN) 12.5 G/50ML
SOLUTION INTRAVENOUS
Status: COMPLETED
Start: 2023-01-01 | End: 2023-01-01

## 2023-01-01 RX ORDER — MELATONIN
1000 DAILY
COMMUNITY

## 2023-01-01 RX ORDER — SODIUM CHLORIDE 9 MG/ML
75 INJECTION, SOLUTION INTRAVENOUS CONTINUOUS
Status: DISCONTINUED | OUTPATIENT
Start: 2023-01-01 | End: 2023-01-01

## 2023-01-01 RX ORDER — ACETAMINOPHEN 325 MG/1
975 TABLET ORAL EVERY 8 HOURS SCHEDULED
Status: DISCONTINUED | OUTPATIENT
Start: 2023-01-01 | End: 2023-01-01 | Stop reason: HOSPADM

## 2023-01-01 RX ORDER — TORSEMIDE 10 MG/1
10 TABLET ORAL DAILY
Status: DISCONTINUED | OUTPATIENT
Start: 2023-01-01 | End: 2023-01-01 | Stop reason: HOSPADM

## 2023-01-01 RX ORDER — HEPARIN SODIUM 5000 [USP'U]/ML
5000 INJECTION, SOLUTION INTRAVENOUS; SUBCUTANEOUS EVERY 8 HOURS SCHEDULED
Status: DISCONTINUED | OUTPATIENT
Start: 2023-01-01 | End: 2023-01-01

## 2023-01-01 RX ORDER — LANOLIN ALCOHOL/MO/W.PET/CERES
3 CREAM (GRAM) TOPICAL
Qty: 30 TABLET | Refills: 0 | Status: SHIPPED | OUTPATIENT
Start: 2023-01-01 | End: 2023-01-01

## 2023-01-01 RX ORDER — HYDROMORPHONE HCL/PF 1 MG/ML
0.3 SYRINGE (ML) INJECTION EVERY 2 HOUR PRN
Status: DISCONTINUED | OUTPATIENT
Start: 2023-01-01 | End: 2023-07-23 | Stop reason: HOSPADM

## 2023-01-01 RX ORDER — SODIUM BICARBONATE 650 MG/1
650 TABLET ORAL
Status: DISCONTINUED | OUTPATIENT
Start: 2023-01-01 | End: 2023-01-01

## 2023-01-01 RX ORDER — INSULIN LISPRO 100 [IU]/ML
1-5 INJECTION, SOLUTION INTRAVENOUS; SUBCUTANEOUS
Status: DISCONTINUED | OUTPATIENT
Start: 2023-01-01 | End: 2023-01-01 | Stop reason: HOSPADM

## 2023-01-01 RX ORDER — CEFTRIAXONE 1 G/50ML
1000 INJECTION, SOLUTION INTRAVENOUS ONCE
Status: COMPLETED | OUTPATIENT
Start: 2023-01-01 | End: 2023-01-01

## 2023-01-01 RX ORDER — ASPIRIN 81 MG/1
324 TABLET, CHEWABLE ORAL ONCE
Status: COMPLETED | OUTPATIENT
Start: 2023-01-01 | End: 2023-01-01

## 2023-01-01 RX ORDER — ALBUTEROL SULFATE 2.5 MG/3ML
2.5 SOLUTION RESPIRATORY (INHALATION) ONCE
Status: DISCONTINUED | OUTPATIENT
Start: 2023-01-01 | End: 2023-01-01

## 2023-01-01 RX ORDER — LANCETS 33 GAUGE
EACH MISCELLANEOUS
Qty: 100 EACH | Refills: 3 | Status: SHIPPED | OUTPATIENT
Start: 2023-01-01 | End: 2023-07-23 | Stop reason: CLARIF

## 2023-01-01 RX ORDER — METHOCARBAMOL 500 MG/1
500 TABLET, FILM COATED ORAL ONCE
Status: COMPLETED | OUTPATIENT
Start: 2023-01-01 | End: 2023-01-01

## 2023-01-01 RX ORDER — METHOCARBAMOL 500 MG/1
250 TABLET, FILM COATED ORAL EVERY 12 HOURS PRN
Status: DISCONTINUED | OUTPATIENT
Start: 2023-01-01 | End: 2023-01-01

## 2023-01-01 RX ORDER — TORSEMIDE 10 MG/1
10 TABLET ORAL DAILY
Qty: 30 TABLET | Refills: 0 | Status: SHIPPED | OUTPATIENT
Start: 2023-01-01 | End: 2023-07-23 | Stop reason: CLARIF

## 2023-01-01 RX ORDER — FOLIC ACID 1 MG/1
1 TABLET ORAL DAILY
Status: DISCONTINUED | OUTPATIENT
Start: 2023-01-01 | End: 2023-01-01

## 2023-01-01 RX ORDER — DOCUSATE SODIUM 100 MG/1
100 CAPSULE, LIQUID FILLED ORAL 2 TIMES DAILY
Qty: 60 CAPSULE | Refills: 0 | Status: SHIPPED | OUTPATIENT
Start: 2023-01-01 | End: 2023-07-23 | Stop reason: CLARIF

## 2023-01-01 RX ORDER — LEVOTHYROXINE SODIUM 0.03 MG/1
25 TABLET ORAL EVERY MORNING
Status: DISCONTINUED | OUTPATIENT
Start: 2023-01-01 | End: 2023-01-01 | Stop reason: HOSPADM

## 2023-01-01 RX ORDER — NALOXONE HYDROCHLORIDE 4 MG/.1ML
SPRAY NASAL
Qty: 1 EACH | Refills: 0 | Status: SHIPPED | OUTPATIENT
Start: 2023-01-01 | End: 2023-07-23 | Stop reason: CLARIF

## 2023-01-01 RX ORDER — SODIUM CHLORIDE, SODIUM GLUCONATE, SODIUM ACETATE, POTASSIUM CHLORIDE, MAGNESIUM CHLORIDE, SODIUM PHOSPHATE, DIBASIC, AND POTASSIUM PHOSPHATE .53; .5; .37; .037; .03; .012; .00082 G/100ML; G/100ML; G/100ML; G/100ML; G/100ML; G/100ML; G/100ML
75 INJECTION, SOLUTION INTRAVENOUS CONTINUOUS
Status: DISPENSED | OUTPATIENT
Start: 2023-01-01 | End: 2023-01-01

## 2023-01-01 RX ORDER — HYDROMORPHONE HCL/PF 1 MG/ML
0.2 SYRINGE (ML) INJECTION EVERY 4 HOURS PRN
Status: DISCONTINUED | OUTPATIENT
Start: 2023-01-01 | End: 2023-01-01

## 2023-01-01 RX ORDER — METHOCARBAMOL 500 MG/1
500 TABLET, FILM COATED ORAL EVERY 12 HOURS PRN
Status: DISCONTINUED | OUTPATIENT
Start: 2023-01-01 | End: 2023-01-01

## 2023-01-01 RX ORDER — ACETAMINOPHEN 325 MG/1
975 TABLET ORAL EVERY 8 HOURS PRN
Refills: 0
Start: 2023-01-01 | End: 2023-07-23 | Stop reason: CLARIF

## 2023-01-01 RX ORDER — CALCIUM GLUCONATE 20 MG/ML
1 INJECTION, SOLUTION INTRAVENOUS ONCE
Status: DISCONTINUED | OUTPATIENT
Start: 2023-01-01 | End: 2023-01-01

## 2023-01-01 RX ORDER — HEPARIN SODIUM 5000 [USP'U]/ML
5000 INJECTION, SOLUTION INTRAVENOUS; SUBCUTANEOUS EVERY 8 HOURS SCHEDULED
Status: DISCONTINUED | OUTPATIENT
Start: 2023-01-01 | End: 2023-01-01 | Stop reason: HOSPADM

## 2023-01-01 RX ORDER — SODIUM POLYSTYRENE SULFONATE 4.1 MEQ/G
15 POWDER, FOR SUSPENSION ORAL; RECTAL ONCE
Status: DISCONTINUED | OUTPATIENT
Start: 2023-01-01 | End: 2023-01-01

## 2023-01-01 RX ORDER — CALCIUM GLUCONATE 20 MG/ML
1 INJECTION, SOLUTION INTRAVENOUS ONCE
Status: COMPLETED | OUTPATIENT
Start: 2023-01-01 | End: 2023-01-01

## 2023-01-01 RX ORDER — GABAPENTIN 100 MG/1
100 CAPSULE ORAL
Qty: 30 CAPSULE | Refills: 0 | Status: SHIPPED | OUTPATIENT
Start: 2023-01-01 | End: 2023-01-01

## 2023-01-01 RX ORDER — BLOOD SUGAR DIAGNOSTIC
STRIP MISCELLANEOUS
Qty: 100 EACH | Refills: 3 | Status: SHIPPED | OUTPATIENT
Start: 2023-01-01 | End: 2023-07-23 | Stop reason: CLARIF

## 2023-01-01 RX ORDER — ACETAMINOPHEN 325 MG/1
650 TABLET ORAL EVERY 6 HOURS PRN
Status: DISCONTINUED | OUTPATIENT
Start: 2023-01-01 | End: 2023-01-01

## 2023-01-01 RX ORDER — LANOLIN ALCOHOL/MO/W.PET/CERES
3 CREAM (GRAM) TOPICAL
Status: DISCONTINUED | OUTPATIENT
Start: 2023-01-01 | End: 2023-01-01 | Stop reason: HOSPADM

## 2023-01-01 RX ORDER — GLYCOPYRROLATE 0.2 MG/ML
0.1 INJECTION INTRAMUSCULAR; INTRAVENOUS EVERY 4 HOURS PRN
Status: DISCONTINUED | OUTPATIENT
Start: 2023-01-01 | End: 2023-07-23 | Stop reason: HOSPADM

## 2023-01-01 RX ORDER — MELATONIN
1000 DAILY
Status: DISCONTINUED | OUTPATIENT
Start: 2023-01-01 | End: 2023-01-01

## 2023-01-01 RX ORDER — DEXTROSE MONOHYDRATE 25 G/50ML
INJECTION, SOLUTION INTRAVENOUS
Status: COMPLETED
Start: 2023-01-01 | End: 2023-01-01

## 2023-01-01 RX ADMIN — FOLIC ACID 1 MG: 1 TABLET ORAL at 09:13

## 2023-01-01 RX ADMIN — HEPARIN SODIUM 5000 UNITS: 5000 INJECTION INTRAVENOUS; SUBCUTANEOUS at 21:12

## 2023-01-01 RX ADMIN — ACETAMINOPHEN 975 MG: 325 TABLET ORAL at 06:33

## 2023-01-01 RX ADMIN — DEXTROSE MONOHYDRATE 25 ML: 25 INJECTION, SOLUTION INTRAVENOUS at 13:43

## 2023-01-01 RX ADMIN — HYDROMORPHONE HYDROCHLORIDE 0.2 MG: 1 INJECTION, SOLUTION INTRAMUSCULAR; INTRAVENOUS; SUBCUTANEOUS at 20:54

## 2023-01-01 RX ADMIN — ACETAMINOPHEN 975 MG: 325 TABLET ORAL at 21:00

## 2023-01-01 RX ADMIN — CALCIUM GLUCONATE 1 G: 20 INJECTION, SOLUTION INTRAVENOUS at 09:23

## 2023-01-01 RX ADMIN — HYDROMORPHONE HYDROCHLORIDE 0.2 MG: 1 INJECTION, SOLUTION INTRAMUSCULAR; INTRAVENOUS; SUBCUTANEOUS at 19:55

## 2023-01-01 RX ADMIN — LIDOCAINE 1 PATCH: 50 PATCH CUTANEOUS at 09:07

## 2023-01-01 RX ADMIN — DOCUSATE SODIUM 100 MG: 100 CAPSULE, LIQUID FILLED ORAL at 08:52

## 2023-01-01 RX ADMIN — SODIUM BICARBONATE 650 MG TABLET 650 MG: at 17:46

## 2023-01-01 RX ADMIN — ACETAMINOPHEN 975 MG: 325 TABLET ORAL at 05:00

## 2023-01-01 RX ADMIN — HYDROMORPHONE HYDROCHLORIDE 0.3 MG: 1 INJECTION, SOLUTION INTRAMUSCULAR; INTRAVENOUS; SUBCUTANEOUS at 18:42

## 2023-01-01 RX ADMIN — CHOLECALCIFEROL (VITAMIN D3) 10 MCG (400 UNIT) TABLET 400 UNITS: at 09:34

## 2023-01-01 RX ADMIN — LEVOTHYROXINE SODIUM 25 MCG: 25 TABLET ORAL at 09:07

## 2023-01-01 RX ADMIN — GABAPENTIN 100 MG: 100 CAPSULE ORAL at 21:00

## 2023-01-01 RX ADMIN — SODIUM BICARBONATE 650 MG TABLET 650 MG: at 17:43

## 2023-01-01 RX ADMIN — Medication 2.5 MG: at 19:52

## 2023-01-01 RX ADMIN — SODIUM BICARBONATE 650 MG TABLET 650 MG: at 07:40

## 2023-01-01 RX ADMIN — Medication 1 TABLET: at 17:09

## 2023-01-01 RX ADMIN — LIDOCAINE 1 PATCH: 50 PATCH CUTANEOUS at 19:55

## 2023-01-01 RX ADMIN — Medication 1 TABLET: at 17:43

## 2023-01-01 RX ADMIN — LEVOTHYROXINE SODIUM 25 MCG: 25 TABLET ORAL at 08:17

## 2023-01-01 RX ADMIN — Medication 2.5 MG: at 21:43

## 2023-01-01 RX ADMIN — HEPARIN SODIUM 5000 UNITS: 5000 INJECTION INTRAVENOUS; SUBCUTANEOUS at 21:13

## 2023-01-01 RX ADMIN — ACETAMINOPHEN 975 MG: 325 TABLET ORAL at 05:19

## 2023-01-01 RX ADMIN — ACETAMINOPHEN 975 MG: 325 TABLET ORAL at 21:13

## 2023-01-01 RX ADMIN — LIDOCAINE 1 PATCH: 50 PATCH CUTANEOUS at 08:37

## 2023-01-01 RX ADMIN — METHOCARBAMOL 500 MG: 500 TABLET ORAL at 09:36

## 2023-01-01 RX ADMIN — DEXTROSE MONOHYDRATE 25 ML: 25 INJECTION, SOLUTION INTRAVENOUS at 09:27

## 2023-01-01 RX ADMIN — SODIUM BICARBONATE 650 MG TABLET 650 MG: at 09:07

## 2023-01-01 RX ADMIN — DOCUSATE SODIUM 100 MG: 100 CAPSULE, LIQUID FILLED ORAL at 17:09

## 2023-01-01 RX ADMIN — DOCUSATE SODIUM 100 MG: 100 CAPSULE, LIQUID FILLED ORAL at 09:07

## 2023-01-01 RX ADMIN — Medication 1 TABLET: at 07:40

## 2023-01-01 RX ADMIN — ASPIRIN 81 MG CHEWABLE TABLET 324 MG: 81 TABLET CHEWABLE at 07:12

## 2023-01-01 RX ADMIN — Medication 2.5 MG: at 07:43

## 2023-01-01 RX ADMIN — LIDOCAINE 1 PATCH: 50 PATCH CUTANEOUS at 08:53

## 2023-01-01 RX ADMIN — HEPARIN SODIUM 5000 UNITS: 5000 INJECTION INTRAVENOUS; SUBCUTANEOUS at 06:33

## 2023-01-01 RX ADMIN — Medication 2.5 MG: at 08:52

## 2023-01-01 RX ADMIN — Medication 2.5 MG: at 10:17

## 2023-01-01 RX ADMIN — METHOCARBAMOL 250 MG: 500 TABLET ORAL at 05:19

## 2023-01-01 RX ADMIN — LIDOCAINE 1 PATCH: 50 PATCH CUTANEOUS at 09:08

## 2023-01-01 RX ADMIN — DEXTROSE MONOHYDRATE 25 ML: 25 INJECTION, SOLUTION INTRAVENOUS at 18:07

## 2023-01-01 RX ADMIN — Medication 1000 UNITS: at 09:13

## 2023-01-01 RX ADMIN — ACETAMINOPHEN 975 MG: 325 TABLET ORAL at 13:30

## 2023-01-01 RX ADMIN — ACETAMINOPHEN 975 MG: 325 TABLET ORAL at 06:23

## 2023-01-01 RX ADMIN — ACETAMINOPHEN 975 MG: 325 TABLET ORAL at 21:12

## 2023-01-01 RX ADMIN — HEPARIN SODIUM 5000 UNITS: 5000 INJECTION INTRAVENOUS; SUBCUTANEOUS at 09:14

## 2023-01-01 RX ADMIN — GABAPENTIN 100 MG: 100 CAPSULE ORAL at 21:12

## 2023-01-01 RX ADMIN — Medication 1 TABLET: at 07:43

## 2023-01-01 RX ADMIN — INSULIN LISPRO 1 UNITS: 100 INJECTION, SOLUTION INTRAVENOUS; SUBCUTANEOUS at 11:59

## 2023-01-01 RX ADMIN — Medication 2.5 MG: at 15:54

## 2023-01-01 RX ADMIN — LORAZEPAM 0.5 MG: 2 INJECTION INTRAMUSCULAR; INTRAVENOUS at 19:06

## 2023-01-01 RX ADMIN — ONDANSETRON 4 MG: 2 INJECTION INTRAMUSCULAR; INTRAVENOUS at 11:07

## 2023-01-01 RX ADMIN — SODIUM CHLORIDE 75 ML/HR: 0.9 INJECTION, SOLUTION INTRAVENOUS at 09:14

## 2023-01-01 RX ADMIN — SODIUM CHLORIDE, SODIUM GLUCONATE, SODIUM ACETATE, POTASSIUM CHLORIDE AND MAGNESIUM CHLORIDE 100 ML/HR: 526; 502; 368; 37; 30 INJECTION, SOLUTION INTRAVENOUS at 23:23

## 2023-01-01 RX ADMIN — ACETAMINOPHEN 650 MG: 325 TABLET ORAL at 19:25

## 2023-01-01 RX ADMIN — ACETAMINOPHEN 975 MG: 325 TABLET ORAL at 13:57

## 2023-01-01 RX ADMIN — SODIUM BICARBONATE 50 MEQ: 84 INJECTION INTRAVENOUS at 18:06

## 2023-01-01 RX ADMIN — Medication 1 TABLET: at 17:46

## 2023-01-01 RX ADMIN — INSULIN HUMAN 10 UNITS: 100 INJECTION, SOLUTION PARENTERAL at 09:27

## 2023-01-01 RX ADMIN — DOCUSATE SODIUM 100 MG: 100 CAPSULE, LIQUID FILLED ORAL at 21:37

## 2023-01-01 RX ADMIN — LIDOCAINE 1 PATCH: 50 PATCH CUTANEOUS at 08:17

## 2023-01-01 RX ADMIN — SODIUM BICARBONATE 650 MG TABLET 650 MG: at 17:09

## 2023-01-01 RX ADMIN — DOCUSATE SODIUM 100 MG: 100 CAPSULE, LIQUID FILLED ORAL at 17:43

## 2023-01-01 RX ADMIN — CEFTRIAXONE 1000 MG: 1 INJECTION, SOLUTION INTRAVENOUS at 07:59

## 2023-01-01 RX ADMIN — TORSEMIDE 10 MG: 10 TABLET ORAL at 08:52

## 2023-01-01 RX ADMIN — METHOCARBAMOL 250 MG: 500 TABLET ORAL at 17:43

## 2023-01-01 RX ADMIN — INSULIN LISPRO 1 UNITS: 100 INJECTION, SOLUTION INTRAVENOUS; SUBCUTANEOUS at 17:00

## 2023-01-01 RX ADMIN — SODIUM BICARBONATE 650 MG TABLET 650 MG: at 11:41

## 2023-01-01 RX ADMIN — GLYCOPYRROLATE 0.1 MG: 0.2 INJECTION, SOLUTION INTRAMUSCULAR; INTRAVENOUS at 18:42

## 2023-01-01 RX ADMIN — SODIUM CHLORIDE 150 ML/HR: 0.9 INJECTION, SOLUTION INTRAVENOUS at 16:10

## 2023-01-01 RX ADMIN — METHOCARBAMOL 250 MG: 500 TABLET ORAL at 11:41

## 2023-01-01 RX ADMIN — HEPARIN SODIUM 5000 UNITS: 5000 INJECTION INTRAVENOUS; SUBCUTANEOUS at 13:31

## 2023-01-01 RX ADMIN — SODIUM BICARBONATE 650 MG TABLET 650 MG: at 09:34

## 2023-01-01 RX ADMIN — ALBUMIN (HUMAN) 25 G: 0.25 INJECTION, SOLUTION INTRAVENOUS at 13:42

## 2023-01-01 RX ADMIN — HEPARIN SODIUM 5000 UNITS: 5000 INJECTION INTRAVENOUS; SUBCUTANEOUS at 13:58

## 2023-01-01 RX ADMIN — DOCUSATE SODIUM 100 MG: 100 CAPSULE, LIQUID FILLED ORAL at 17:46

## 2023-01-01 RX ADMIN — ALBUMIN (HUMAN) 25 G: 12.5 SOLUTION INTRAVENOUS at 13:42

## 2023-01-01 RX ADMIN — LEVOTHYROXINE SODIUM 25 MCG: 25 TABLET ORAL at 08:52

## 2023-01-01 RX ADMIN — DOCUSATE SODIUM 100 MG: 100 CAPSULE, LIQUID FILLED ORAL at 08:17

## 2023-01-01 RX ADMIN — INSULIN HUMAN 10 UNITS: 100 INJECTION, SOLUTION PARENTERAL at 18:07

## 2023-01-01 RX ADMIN — SODIUM CHLORIDE 500 ML: 0.9 INJECTION, SOLUTION INTRAVENOUS at 07:12

## 2023-01-01 RX ADMIN — SODIUM BICARBONATE 100 ML/HR: 84 INJECTION, SOLUTION INTRAVENOUS at 18:55

## 2023-01-01 RX ADMIN — HEPARIN SODIUM 5000 UNITS: 5000 INJECTION INTRAVENOUS; SUBCUTANEOUS at 05:01

## 2023-01-01 RX ADMIN — GABAPENTIN 100 MG: 100 CAPSULE ORAL at 21:13

## 2023-01-01 RX ADMIN — HEPARIN SODIUM 5000 UNITS: 5000 INJECTION INTRAVENOUS; SUBCUTANEOUS at 05:19

## 2023-01-01 RX ADMIN — SODIUM BICARBONATE: 84 INJECTION, SOLUTION INTRAVENOUS at 12:48

## 2023-01-01 RX ADMIN — Medication 2.5 MG: at 11:20

## 2023-01-01 RX ADMIN — GABAPENTIN 100 MG: 100 CAPSULE ORAL at 21:37

## 2023-01-01 RX ADMIN — SODIUM CHLORIDE, SODIUM GLUCONATE, SODIUM ACETATE, POTASSIUM CHLORIDE AND MAGNESIUM CHLORIDE 100 ML/HR: 526; 502; 368; 37; 30 INJECTION, SOLUTION INTRAVENOUS at 06:33

## 2023-01-01 RX ADMIN — Medication 2.5 MG: at 13:31

## 2023-01-01 RX ADMIN — ACETAMINOPHEN 975 MG: 325 TABLET ORAL at 14:56

## 2023-01-01 RX ADMIN — Medication 2.5 MG: at 16:03

## 2023-01-01 RX ADMIN — HEPARIN SODIUM 5000 UNITS: 5000 INJECTION INTRAVENOUS; SUBCUTANEOUS at 06:23

## 2023-01-01 RX ADMIN — LEVOTHYROXINE SODIUM 25 MCG: 25 TABLET ORAL at 08:37

## 2023-01-01 RX ADMIN — DOCUSATE SODIUM 100 MG: 100 CAPSULE, LIQUID FILLED ORAL at 09:13

## 2023-01-01 RX ADMIN — LEVOTHYROXINE SODIUM 25 MCG: 25 TABLET ORAL at 09:13

## 2023-01-01 RX ADMIN — HEPARIN SODIUM 5000 UNITS: 5000 INJECTION INTRAVENOUS; SUBCUTANEOUS at 21:37

## 2023-01-01 RX ADMIN — SODIUM BICARBONATE 650 MG TABLET 650 MG: at 07:43

## 2023-01-01 RX ADMIN — HEPARIN SODIUM 5000 UNITS: 5000 INJECTION INTRAVENOUS; SUBCUTANEOUS at 14:57

## 2023-01-01 RX ADMIN — Medication 1 TABLET: at 06:34

## 2023-01-03 ENCOUNTER — TELEPHONE (OUTPATIENT)
Dept: FAMILY MEDICINE CLINIC | Facility: CLINIC | Age: 88
End: 2023-01-03

## 2023-01-03 NOTE — TELEPHONE ENCOUNTER
Dr Howe Sheila    Patient"s daughter says metformin 1000 mg was increased from 1 tanb to 1 1/12 tab daily at last visit  She has none left and pharmacy will not refill before 1/7  Please send new rx to [phone]

## 2023-01-26 DIAGNOSIS — E03.9 HYPOTHYROIDISM, UNSPECIFIED TYPE: ICD-10-CM

## 2023-01-26 RX ORDER — LEVOTHYROXINE SODIUM 0.03 MG/1
TABLET ORAL
Qty: 90 TABLET | Refills: 0 | Status: SHIPPED | OUTPATIENT
Start: 2023-01-26

## 2023-02-20 ENCOUNTER — RA CDI HCC (OUTPATIENT)
Dept: OTHER | Facility: HOSPITAL | Age: 88
End: 2023-02-20

## 2023-03-24 ENCOUNTER — TELEPHONE (OUTPATIENT)
Dept: FAMILY MEDICINE CLINIC | Facility: CLINIC | Age: 88
End: 2023-03-24

## 2023-03-24 NOTE — TELEPHONE ENCOUNTER
Dr George Stokes from lab called stating that patient went for labwork this morning with her daughter  Her daughter thought that she was supposed to get labwork done today? Please call daughter and let her know if she was supposed to have labs done  She is aware that you are out of office today

## 2023-03-24 NOTE — TELEPHONE ENCOUNTER
I hadn't ordered any specifically for today but she is due for hga1c so I will put that in chart along with basic metabolic panel to check renal function

## 2023-03-24 NOTE — TELEPHONE ENCOUNTER
Called and spoke with pt  I told her you did put orders in chart  She said she will go on Monday and get BW done

## 2023-03-29 NOTE — TELEPHONE ENCOUNTER
Please mail pt lab orders placed 3/29/23 or inform pt lab orders in chart and can just let [facility] lab know when she goes--thanks

## 2023-04-24 ENCOUNTER — APPOINTMENT (OUTPATIENT)
Dept: LAB | Facility: CLINIC | Age: 88
End: 2023-04-24

## 2023-04-24 DIAGNOSIS — E03.9 HYPOTHYROIDISM, UNSPECIFIED TYPE: ICD-10-CM

## 2023-04-24 DIAGNOSIS — Z13.0 SCREENING FOR DEFICIENCY ANEMIA: ICD-10-CM

## 2023-04-24 DIAGNOSIS — E11.65 TYPE 2 DIABETES MELLITUS WITH HYPERGLYCEMIA, WITHOUT LONG-TERM CURRENT USE OF INSULIN (HCC): ICD-10-CM

## 2023-04-24 DIAGNOSIS — E78.2 MIXED HYPERLIPIDEMIA: ICD-10-CM

## 2023-04-24 LAB
ALBUMIN SERPL BCP-MCNC: 3.8 G/DL (ref 3.5–5)
ALP SERPL-CCNC: 71 U/L (ref 46–116)
ALT SERPL W P-5'-P-CCNC: 19 U/L (ref 12–78)
ANION GAP SERPL CALCULATED.3IONS-SCNC: 6 MMOL/L (ref 4–13)
AST SERPL W P-5'-P-CCNC: 11 U/L (ref 5–45)
BILIRUB SERPL-MCNC: 0.34 MG/DL (ref 0.2–1)
BUN SERPL-MCNC: 40 MG/DL (ref 5–25)
CALCIUM SERPL-MCNC: 10 MG/DL (ref 8.3–10.1)
CHLORIDE SERPL-SCNC: 110 MMOL/L (ref 96–108)
CHOLEST SERPL-MCNC: 206 MG/DL
CO2 SERPL-SCNC: 21 MMOL/L (ref 21–32)
CREAT SERPL-MCNC: 1.84 MG/DL (ref 0.6–1.3)
ERYTHROCYTE [DISTWIDTH] IN BLOOD BY AUTOMATED COUNT: 13.5 % (ref 11.6–15.1)
GFR SERPL CREATININE-BSD FRML MDRD: 23 ML/MIN/1.73SQ M
GLUCOSE P FAST SERPL-MCNC: 236 MG/DL (ref 65–99)
HCT VFR BLD AUTO: 32 % (ref 34.8–46.1)
HDLC SERPL-MCNC: 40 MG/DL
HGB BLD-MCNC: 10.6 G/DL (ref 11.5–15.4)
LDLC SERPL CALC-MCNC: 124 MG/DL (ref 0–100)
MCH RBC QN AUTO: 31.8 PG (ref 26.8–34.3)
MCHC RBC AUTO-ENTMCNC: 33.1 G/DL (ref 31.4–37.4)
MCV RBC AUTO: 96 FL (ref 82–98)
PLATELET # BLD AUTO: 191 THOUSANDS/UL (ref 149–390)
PMV BLD AUTO: 10 FL (ref 8.9–12.7)
POTASSIUM SERPL-SCNC: 5 MMOL/L (ref 3.5–5.3)
PROT SERPL-MCNC: 7 G/DL (ref 6.4–8.4)
RBC # BLD AUTO: 3.33 MILLION/UL (ref 3.81–5.12)
SODIUM SERPL-SCNC: 137 MMOL/L (ref 135–147)
TRIGL SERPL-MCNC: 210 MG/DL
TSH SERPL DL<=0.05 MIU/L-ACNC: 4.31 UIU/ML (ref 0.45–4.5)
WBC # BLD AUTO: 6.57 THOUSAND/UL (ref 4.31–10.16)

## 2023-04-25 ENCOUNTER — OFFICE VISIT (OUTPATIENT)
Dept: FAMILY MEDICINE CLINIC | Facility: CLINIC | Age: 88
End: 2023-04-25

## 2023-04-25 VITALS
DIASTOLIC BLOOD PRESSURE: 68 MMHG | WEIGHT: 134.4 LBS | HEIGHT: 59 IN | TEMPERATURE: 97.9 F | HEART RATE: 64 BPM | RESPIRATION RATE: 16 BRPM | BODY MASS INDEX: 27.09 KG/M2 | SYSTOLIC BLOOD PRESSURE: 138 MMHG

## 2023-04-25 DIAGNOSIS — N18.4 CHRONIC RENAL DISEASE, STAGE IV (HCC): ICD-10-CM

## 2023-04-25 DIAGNOSIS — Z00.00 MEDICARE ANNUAL WELLNESS VISIT, SUBSEQUENT: ICD-10-CM

## 2023-04-25 DIAGNOSIS — E11.65 TYPE 2 DIABETES MELLITUS WITH HYPERGLYCEMIA, WITHOUT LONG-TERM CURRENT USE OF INSULIN (HCC): Primary | ICD-10-CM

## 2023-04-25 DIAGNOSIS — E03.9 HYPOTHYROIDISM, UNSPECIFIED TYPE: ICD-10-CM

## 2023-04-25 LAB
EST. AVERAGE GLUCOSE BLD GHB EST-MCNC: 240 MG/DL
HBA1C MFR BLD: 10 %
LIPASE SERPL-CCNC: 550 U/L (ref 73–393)
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
## 2023-04-25 NOTE — PATIENT INSTRUCTIONS
Medicare Preventive Visit Patient Instructions  Thank you for completing your Welcome to Medicare Visit or Medicare Annual Wellness Visit today  Your next wellness visit will be due in one year (4/25/2024)  The screening/preventive services that you may require over the next 5-10 years are detailed below  Some tests may not apply to you based off risk factors and/or age  Screening tests ordered at today's visit but not completed yet may show as past due  Also, please note that scanned in results may not display below  Preventive Screenings:  Service Recommendations Previous Testing/Comments   Colorectal Cancer Screening  * Colonoscopy    * Fecal Occult Blood Test (FOBT)/Fecal Immunochemical Test (FIT)  * Fecal DNA/Cologuard Test  * Flexible Sigmoidoscopy Age: 39-70 years old   Colonoscopy: every 10 years (may be performed more frequently if at higher risk)  OR  FOBT/FIT: every 1 year  OR  Cologuard: every 3 years  OR  Sigmoidoscopy: every 5 years  Screening may be recommended earlier than age 39 if at higher risk for colorectal cancer  Also, an individualized decision between you and your healthcare provider will decide whether screening between the ages of 74-80 would be appropriate  Colonoscopy: Not on file  FOBT/FIT: Not on file  Cologuard: Not on file  Sigmoidoscopy: Not on file    Screening Not Indicated     Breast Cancer Screening Age: 36 years old  Frequency: every 1-2 years  Not required if history of left and right mastectomy Mammogram: Not on file        Cervical Cancer Screening Between the ages of 21-29, pap smear recommended once every 3 years  Between the ages of 33-67, can perform pap smear with HPV co-testing every 5 years     Recommendations may differ for women with a history of total hysterectomy, cervical cancer, or abnormal pap smears in past  Pap Smear: Not on file    Screening Not Indicated   Hepatitis C Screening Once for adults born between 1945 and 1965  More frequently in patients at high risk for Hepatitis C Hep C Antibody: Not on file        Diabetes Screening 1-2 times per year if you're at risk for diabetes or have pre-diabetes Fasting glucose: 236 mg/dL (4/24/2023)  A1C: 10 0 % (4/24/2023)  Screening Not Indicated  History Diabetes   Cholesterol Screening Once every 5 years if you don't have a lipid disorder  May order more often based on risk factors  Lipid panel: 04/24/2023    Screening Not Indicated  History Lipid Disorder     Other Preventive Screenings Covered by Medicare:  1  Abdominal Aortic Aneurysm (AAA) Screening: covered once if your at risk  You're considered to be at risk if you have a family history of AAA  2  Lung Cancer Screening: covers low dose CT scan once per year if you meet all of the following conditions: (1) Age 50-69; (2) No signs or symptoms of lung cancer; (3) Current smoker or have quit smoking within the last 15 years; (4) You have a tobacco smoking history of at least 20 pack years (packs per day multiplied by number of years you smoked); (5) You get a written order from a healthcare provider  3  Glaucoma Screening: covered annually if you're considered high risk: (1) You have diabetes OR (2) Family history of glaucoma OR (3)  aged 48 and older OR (3)  American aged 72 and older  3  Osteoporosis Screening: covered every 2 years if you meet one of the following conditions: (1) You're estrogen deficient and at risk for osteoporosis based off medical history and other findings; (2) Have a vertebral abnormality; (3) On glucocorticoid therapy for more than 3 months; (4) Have primary hyperparathyroidism; (5) On osteoporosis medications and need to assess response to drug therapy  · Last bone density test (DXA Scan): Not on file  5  HIV Screening: covered annually if you're between the age of 12-76  Also covered annually if you are younger than 13 and older than 72 with risk factors for HIV infection   For pregnant patients, it is covered up to 3 times per pregnancy  Immunizations:  Immunization Recommendations   Influenza Vaccine Annual influenza vaccination during flu season is recommended for all persons aged >= 6 months who do not have contraindications   Pneumococcal Vaccine   * Pneumococcal conjugate vaccine = PCV13 (Prevnar 13), PCV15 (Vaxneuvance), PCV20 (Prevnar 20)  * Pneumococcal polysaccharide vaccine = PPSV23 (Pneumovax) Adults 25-60 years old: 1-3 doses may be recommended based on certain risk factors  Adults 72 years old: 1-2 doses may be recommended based off what pneumonia vaccine you previously received   Hepatitis B Vaccine 3 dose series if at intermediate or high risk (ex: diabetes, end stage renal disease, liver disease)   Tetanus (Td) Vaccine - COST NOT COVERED BY MEDICARE PART B Following completion of primary series, a booster dose should be given every 10 years to maintain immunity against tetanus  Td may also be given as tetanus wound prophylaxis  Tdap Vaccine - COST NOT COVERED BY MEDICARE PART B Recommended at least once for all adults  For pregnant patients, recommended with each pregnancy  Shingles Vaccine (Shingrix) - COST NOT COVERED BY MEDICARE PART B  2 shot series recommended in those aged 48 and above     Health Maintenance Due:  There are no preventive care reminders to display for this patient  Immunizations Due:      Topic Date Due   • COVID-19 Vaccine (4 - Booster for Moderna series) 01/07/2022     Advance Directives   What are advance directives? Advance directives are legal documents that state your wishes and plans for medical care  These plans are made ahead of time in case you lose your ability to make decisions for yourself  Advance directives can apply to any medical decision, such as the treatments you want, and if you want to donate organs  What are the types of advance directives? There are many types of advance directives, and each state has rules about how to use them   You may choose a combination of any of the following:  · Living will: This is a written record of the treatment you want  You can also choose which treatments you do not want, which to limit, and which to stop at a certain time  This includes surgery, medicine, IV fluid, and tube feedings  · Durable power of  for healthcare [facility]): This is a written record that states who you want to make healthcare choices for you when you are unable to make them for yourself  This person, called a proxy, is usually a family member or a friend  You may choose more than 1 proxy  · Do not resuscitate (DNR) order:  A DNR order is used in case your heart stops beating or you stop breathing  It is a request not to have certain forms of treatment, such as CPR  A DNR order may be included in other types of advance directives  · Medical directive: This covers the care that you want if you are in a coma, near death, or unable to make decisions for yourself  You can list the treatments you want for each condition  Treatment may include pain medicine, surgery, blood transfusions, dialysis, IV or tube feedings, and a ventilator (breathing machine)  · Values history: This document has questions about your views, beliefs, and how you feel and think about life  This information can help others choose the care that you would choose  Why are advance directives important? An advance directive helps you control your care  Although spoken wishes may be used, it is better to have your wishes written down  Spoken wishes can be misunderstood, or not followed  Treatments may be given even if you do not want them  An advance directive may make it easier for your family to make difficult choices about your care  Urinary Incontinence   Urinary incontinence (UI)  is when you lose control of your bladder  UI develops because your bladder cannot store or empty urine properly   The 3 most common types of UI are stress incontinence, urge incontinence, or both   Medicines:   · May be given to help strengthen your bladder control  Report any side effects of medication to your healthcare provider  Do pelvic muscle exercises often:  Your pelvic muscles help you stop urinating  Squeeze these muscles tight for 5 seconds, then relax for 5 seconds  Gradually work up to squeezing for 10 seconds  Do 3 sets of 15 repetitions a day, or as directed  This will help strengthen your pelvic muscles and improve bladder control  Train your bladder:  Go to the bathroom at set times, such as every 2 hours, even if you do not feel the urge to go  You can also try to hold your urine when you feel the urge to go  For example, hold your urine for 5 minutes when you feel the urge to go  As that becomes easier, hold your urine for 10 minutes  Self-care:   · Keep a UI record  Write down how often you leak urine and how much you leak  Make a note of what you were doing when you leaked urine  · Drink liquids as directed  You may need to limit the amount of liquid you drink to help control your urine leakage  Do not drink any liquid right before you go to bed  Limit or do not have drinks that contain caffeine or alcohol  · Prevent constipation  Eat a variety of high-fiber foods  Good examples are high-fiber cereals, beans, vegetables, and whole-grain breads  Walking is the best way to trigger your intestines to have a bowel movement  · Exercise regularly and maintain a healthy weight  Weight loss and exercise will decrease pressure on your bladder and help you control your leakage  · Use a catheter as directed  to help empty your bladder  A catheter is a tiny, plastic tube that is put into your bladder to drain your urine  · Go to behavior therapy as directed  Behavior therapy may be used to help you learn to control your urge to urinate      Weight Management   Why it is important to manage your weight:  Being overweight increases your risk of health conditions such as heart disease, high blood pressure, type 2 diabetes, and certain types of cancer  It can also increase your risk for osteoarthritis, sleep apnea, and other respiratory problems  Aim for a slow, steady weight loss  Even a small amount of weight loss can lower your risk of health problems  How to lose weight safely:  A safe and healthy way to lose weight is to eat fewer calories and get regular exercise  You can lose up about 1 pound a week by decreasing the number of calories you eat by 500 calories each day  Healthy meal plan for weight management:  A healthy meal plan includes a variety of foods, contains fewer calories, and helps you stay healthy  A healthy meal plan includes the following:  · Eat whole-grain foods more often  A healthy meal plan should contain fiber  Fiber is the part of grains, fruits, and vegetables that is not broken down by your body  Whole-grain foods are healthy and provide extra fiber in your diet  Some examples of whole-grain foods are whole-wheat breads and pastas, oatmeal, brown rice, and bulgur  · Eat a variety of vegetables every day  Include dark, leafy greens such as spinach, kale, geovanna greens, and mustard greens  Eat yellow and orange vegetables such as carrots, sweet potatoes, and winter squash  · Eat a variety of fruits every day  Choose fresh or canned fruit (canned in its own juice or light syrup) instead of juice  Fruit juice has very little or no fiber  · Eat low-fat dairy foods  Drink fat-free (skim) milk or 1% milk  Eat fat-free yogurt and low-fat cottage cheese  Try low-fat cheeses such as mozzarella and other reduced-fat cheeses  · Choose meat and other protein foods that are low in fat  Choose beans or other legumes such as split peas or lentils  Choose fish, skinless poultry (chicken or turkey), or lean cuts of red meat (beef or pork)  Before you cook meat or poultry, cut off any visible fat  · Use less fat and oil  Try baking foods instead of frying them  Add less fat, such as margarine, sour cream, regular salad dressing and mayonnaise to foods  Eat fewer high-fat foods  Some examples of high-fat foods include french fries, doughnuts, ice cream, and cakes  · Eat fewer sweets  Limit foods and drinks that are high in sugar  This includes candy, cookies, regular soda, and sweetened drinks  Exercise:  Exercise at least 30 minutes per day on most days of the week  Some examples of exercise include walking, biking, dancing, and swimming  You can also fit in more physical activity by taking the stairs instead of the elevator or parking farther away from stores  Ask your healthcare provider about the best exercise plan for you  © Copyright [employer] 2018 Information is for End User's use only and may not be sold, redistributed or otherwise used for commercial purposes   All illustrations and images included in CareNotes® are the copyrighted property of [employer]  or [address] [place] [address]

## 2023-04-26 ENCOUNTER — TELEPHONE (OUTPATIENT)
Dept: FAMILY MEDICINE CLINIC | Facility: CLINIC | Age: 88
End: 2023-04-26

## 2023-04-26 NOTE — TELEPHONE ENCOUNTER
Spoke with pt daughter states [facility] cost $325 pt was on metformin  Requesting labs be drawn again on Friday for sugar and kidneys and requesting UA pt could not produce at office visit yesterday

## 2023-04-26 NOTE — TELEPHONE ENCOUNTER
Patient left message on machine asking that something else be called in for her as it will cost her $345  Can you call in something different for her  Alysha Frank, November 3rd, 1931  I'm calling in reference to having a new prescription subscribed to me and it's $345  I cannot afford that  This is for Doctor Reilly Aguirre and I would appreciate if someone would call me back [phone]  Thank you so much

## 2023-05-03 ENCOUNTER — LAB (OUTPATIENT)
Dept: LAB | Facility: CLINIC | Age: 88
End: 2023-05-03

## 2023-05-03 DIAGNOSIS — E11.65 TYPE 2 DIABETES MELLITUS WITH HYPERGLYCEMIA, WITHOUT LONG-TERM CURRENT USE OF INSULIN (HCC): ICD-10-CM

## 2023-05-03 LAB
ANION GAP SERPL CALCULATED.3IONS-SCNC: 4 MMOL/L (ref 4–13)
BACTERIA UR QL AUTO: ABNORMAL /HPF
BILIRUB UR QL STRIP: NEGATIVE
BUN SERPL-MCNC: 47 MG/DL (ref 5–25)
CALCIUM SERPL-MCNC: 9.8 MG/DL (ref 8.3–10.1)
CHLORIDE SERPL-SCNC: 110 MMOL/L (ref 96–108)
CLARITY UR: ABNORMAL
CO2 SERPL-SCNC: 23 MMOL/L (ref 21–32)
COLOR UR: ABNORMAL
CREAT SERPL-MCNC: 2.03 MG/DL (ref 0.6–1.3)
GFR SERPL CREATININE-BSD FRML MDRD: 20 ML/MIN/1.73SQ M
GLUCOSE P FAST SERPL-MCNC: 189 MG/DL (ref 65–99)
GLUCOSE UR STRIP-MCNC: NEGATIVE MG/DL
HGB UR QL STRIP.AUTO: ABNORMAL
KETONES UR STRIP-MCNC: NEGATIVE MG/DL
LEUKOCYTE ESTERASE UR QL STRIP: ABNORMAL
NITRITE UR QL STRIP: POSITIVE
NON-SQ EPI CELLS URNS QL MICRO: ABNORMAL /HPF
PH UR STRIP.AUTO: 6 [PH]
POTASSIUM SERPL-SCNC: 4.5 MMOL/L (ref 3.5–5.3)
PROT UR STRIP-MCNC: ABNORMAL MG/DL
RBC #/AREA URNS AUTO: ABNORMAL /HPF
SODIUM SERPL-SCNC: 137 MMOL/L (ref 135–147)
SP GR UR STRIP.AUTO: 1.01 (ref 1–1.03)
UROBILINOGEN UR STRIP-ACNC: <2 MG/DL
WBC #/AREA URNS AUTO: ABNORMAL /HPF
WBC CLUMPS # UR AUTO: PRESENT /UL

## 2023-05-05 ENCOUNTER — TELEPHONE (OUTPATIENT)
Dept: FAMILY MEDICINE CLINIC | Facility: CLINIC | Age: 88
End: 2023-05-05

## 2023-05-05 LAB — BACTERIA UR CULT: ABNORMAL

## 2023-05-05 NOTE — TELEPHONE ENCOUNTER
Patient called and left message on machine asking for a call back with her results  Please advise  Thank you  Gerhardt Dam November 3rd, 1931  I want to find the results of my blood work that Doctor Kena Wasserman had ordered and the urinalysis  It was two days ago  My phone number is [phone]  Looking forward to hearing from you  Thank you   Bye

## 2023-05-15 DIAGNOSIS — E03.9 HYPOTHYROIDISM, UNSPECIFIED TYPE: ICD-10-CM

## 2023-05-15 RX ORDER — LEVOTHYROXINE SODIUM 0.03 MG/1
TABLET ORAL
Qty: 90 TABLET | Refills: 0 | Status: SHIPPED | OUTPATIENT
Start: 2023-05-15

## 2023-05-16 ENCOUNTER — TELEPHONE (OUTPATIENT)
Dept: FAMILY MEDICINE CLINIC | Facility: CLINIC | Age: 88
End: 2023-05-16

## 2023-05-16 NOTE — TELEPHONE ENCOUNTER
Dr María Herman:    Spoke with patient's daughter and she stated that patient started ABX yesterday  She will bring her mom to lab on Monday for labwork    Please place an order in patient's chart

## 2023-05-18 ENCOUNTER — RA CDI HCC (OUTPATIENT)
Dept: OTHER | Facility: HOSPITAL | Age: 88
End: 2023-05-18

## 2023-05-18 NOTE — PROGRESS NOTES
E11 22  [facility]  coding opportunities          Chart Reviewed number of suggestions sent to Provider: 1     Patients Insurance     Medicare Insurance: [employer]

## 2023-05-19 ENCOUNTER — FOLLOW UP (OUTPATIENT)
Dept: URBAN - METROPOLITAN AREA CLINIC 27 | Facility: CLINIC | Age: 88
End: 2023-05-19

## 2023-05-19 DIAGNOSIS — E11.9: ICD-10-CM

## 2023-05-19 DIAGNOSIS — H31.8: ICD-10-CM

## 2023-05-19 DIAGNOSIS — Z96.1: ICD-10-CM

## 2023-05-19 DIAGNOSIS — H35.3231: ICD-10-CM

## 2023-05-19 DIAGNOSIS — H43.823: ICD-10-CM

## 2023-05-19 PROCEDURE — 92014 COMPRE OPH EXAM EST PT 1/>: CPT | Mod: 25

## 2023-05-19 PROCEDURE — P EYLEA PFS

## 2023-05-19 PROCEDURE — 67028 INJECTION EYE DRUG: CPT

## 2023-05-19 PROCEDURE — 92134 CPTRZ OPH DX IMG PST SGM RTA: CPT

## 2023-05-19 ASSESSMENT — VISUAL ACUITY
OD_PH: 20/80-2
OS_CC: 20/60-1
OD_CC: 20/100+1

## 2023-05-19 ASSESSMENT — TONOMETRY
OS_IOP_MMHG: 18
OD_IOP_MMHG: 21

## 2023-05-23 ENCOUNTER — APPOINTMENT (OUTPATIENT)
Dept: LAB | Facility: CLINIC | Age: 88
End: 2023-05-23

## 2023-05-23 DIAGNOSIS — N39.0 UTI DUE TO KLEBSIELLA SPECIES: ICD-10-CM

## 2023-05-23 DIAGNOSIS — B96.89 UTI DUE TO KLEBSIELLA SPECIES: ICD-10-CM

## 2023-05-24 LAB — BACTERIA UR CULT: NORMAL

## 2023-06-02 ENCOUNTER — PROCEDURE ONLY (OUTPATIENT)
Dept: URBAN - METROPOLITAN AREA CLINIC 27 | Facility: CLINIC | Age: 88
End: 2023-06-02

## 2023-06-02 DIAGNOSIS — H35.3231: ICD-10-CM

## 2023-06-02 PROCEDURE — 67028 INJECTION EYE DRUG: CPT

## 2023-06-02 PROCEDURE — P EYLEA PFS

## 2023-06-02 ASSESSMENT — VISUAL ACUITY: OS_CC: 20/60-2

## 2023-06-02 ASSESSMENT — TONOMETRY: OS_IOP_MMHG: 20

## 2023-06-08 ENCOUNTER — APPOINTMENT (OUTPATIENT)
Dept: LAB | Facility: CLINIC | Age: 88
End: 2023-06-08
Payer: MEDICARE

## 2023-06-08 DIAGNOSIS — N18.4 CHRONIC RENAL DISEASE, STAGE IV (HCC): ICD-10-CM

## 2023-06-08 LAB
ANION GAP SERPL CALCULATED.3IONS-SCNC: 4 MMOL/L (ref 4–13)
BUN SERPL-MCNC: 53 MG/DL (ref 5–25)
CALCIUM SERPL-MCNC: 9.4 MG/DL (ref 8.3–10.1)
CHLORIDE SERPL-SCNC: 111 MMOL/L (ref 96–108)
CO2 SERPL-SCNC: 23 MMOL/L (ref 21–32)
CREAT SERPL-MCNC: 2.8 MG/DL (ref 0.6–1.3)
GFR SERPL CREATININE-BSD FRML MDRD: 14 ML/MIN/1.73SQ M
GLUCOSE P FAST SERPL-MCNC: 155 MG/DL (ref 65–99)
POTASSIUM SERPL-SCNC: 5.2 MMOL/L (ref 3.5–5.3)
SODIUM SERPL-SCNC: 138 MMOL/L (ref 135–147)

## 2023-06-08 PROCEDURE — 80048 BASIC METABOLIC PNL TOTAL CA: CPT

## 2023-06-08 PROCEDURE — 36415 COLL VENOUS BLD VENIPUNCTURE: CPT

## 2023-06-09 ENCOUNTER — TELEMEDICINE (OUTPATIENT)
Dept: FAMILY MEDICINE CLINIC | Facility: CLINIC | Age: 88
End: 2023-06-09
Payer: MEDICARE

## 2023-06-09 DIAGNOSIS — N18.9 ACUTE KIDNEY INJURY SUPERIMPOSED ON CKD (HCC): Primary | ICD-10-CM

## 2023-06-09 DIAGNOSIS — N17.9 ACUTE KIDNEY INJURY SUPERIMPOSED ON CKD (HCC): Primary | ICD-10-CM

## 2023-06-09 DIAGNOSIS — E11.65 TYPE 2 DIABETES MELLITUS WITH HYPERGLYCEMIA, WITHOUT LONG-TERM CURRENT USE OF INSULIN (HCC): ICD-10-CM

## 2023-06-09 PROCEDURE — 99442 PR PHYS/QHP TELEPHONE EVALUATION 11-20 MIN: CPT | Performed by: FAMILY MEDICINE
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
## 2023-06-14 ENCOUNTER — APPOINTMENT (OUTPATIENT)
Dept: LAB | Facility: CLINIC | Age: 88
End: 2023-06-14
Payer: COMMERCIAL

## 2023-06-14 DIAGNOSIS — E11.65 TYPE 2 DIABETES MELLITUS WITH HYPERGLYCEMIA, WITHOUT LONG-TERM CURRENT USE OF INSULIN (HCC): ICD-10-CM

## 2023-06-14 DIAGNOSIS — N17.9 ACUTE KIDNEY INJURY SUPERIMPOSED ON CKD (HCC): ICD-10-CM

## 2023-06-14 DIAGNOSIS — N18.4 CHRONIC RENAL DISEASE, STAGE IV (HCC): ICD-10-CM

## 2023-06-14 DIAGNOSIS — N18.9 ACUTE KIDNEY INJURY SUPERIMPOSED ON CKD (HCC): ICD-10-CM

## 2023-06-14 LAB
ANION GAP SERPL CALCULATED.3IONS-SCNC: 6 MMOL/L (ref 4–13)
BACTERIA UR QL AUTO: ABNORMAL /HPF
BILIRUB UR QL STRIP: NEGATIVE
BUN SERPL-MCNC: 44 MG/DL (ref 5–25)
CALCIUM SERPL-MCNC: 10 MG/DL (ref 8.3–10.1)
CHLORIDE SERPL-SCNC: 115 MMOL/L (ref 96–108)
CLARITY UR: ABNORMAL
CO2 SERPL-SCNC: 21 MMOL/L (ref 21–32)
COLOR UR: ABNORMAL
CREAT SERPL-MCNC: 2.5 MG/DL (ref 0.6–1.3)
CREAT UR-MCNC: 48.9 MG/DL
GFR SERPL CREATININE-BSD FRML MDRD: 16 ML/MIN/1.73SQ M
GLUCOSE P FAST SERPL-MCNC: 168 MG/DL (ref 65–99)
GLUCOSE UR STRIP-MCNC: NEGATIVE MG/DL
HGB UR QL STRIP.AUTO: ABNORMAL
KETONES UR STRIP-MCNC: NEGATIVE MG/DL
LEUKOCYTE ESTERASE UR QL STRIP: ABNORMAL
MICROALBUMIN UR-MCNC: 722 MG/L (ref 0–20)
MICROALBUMIN/CREAT 24H UR: 1476 MG/G CREATININE (ref 0–30)
MUCOUS THREADS UR QL AUTO: ABNORMAL
NITRITE UR QL STRIP: POSITIVE
NON-SQ EPI CELLS URNS QL MICRO: ABNORMAL /HPF
PH UR STRIP.AUTO: 5.5 [PH]
POTASSIUM SERPL-SCNC: 4.5 MMOL/L (ref 3.5–5.3)
PROT UR STRIP-MCNC: ABNORMAL MG/DL
RBC #/AREA URNS AUTO: ABNORMAL /HPF
SODIUM SERPL-SCNC: 142 MMOL/L (ref 135–147)
SP GR UR STRIP.AUTO: 1.01 (ref 1–1.03)
UROBILINOGEN UR STRIP-ACNC: <2 MG/DL
WBC #/AREA URNS AUTO: ABNORMAL /HPF
WBC CLUMPS # UR AUTO: PRESENT /UL

## 2023-06-14 PROCEDURE — 80048 BASIC METABOLIC PNL TOTAL CA: CPT

## 2023-06-14 PROCEDURE — 87186 SC STD MICRODIL/AGAR DIL: CPT

## 2023-06-14 PROCEDURE — 87086 URINE CULTURE/COLONY COUNT: CPT

## 2023-06-14 PROCEDURE — 87077 CULTURE AEROBIC IDENTIFY: CPT

## 2023-06-14 PROCEDURE — 81001 URINALYSIS AUTO W/SCOPE: CPT | Performed by: FAMILY MEDICINE

## 2023-06-14 PROCEDURE — 82043 UR ALBUMIN QUANTITATIVE: CPT

## 2023-06-14 PROCEDURE — 82570 ASSAY OF URINE CREATININE: CPT

## 2023-06-14 PROCEDURE — 36415 COLL VENOUS BLD VENIPUNCTURE: CPT

## 2023-06-16 LAB
BACTERIA UR CULT: ABNORMAL
BACTERIA UR CULT: ABNORMAL

## 2023-06-20 ENCOUNTER — HOSPITAL ENCOUNTER (OUTPATIENT)
Dept: RADIOLOGY | Facility: HOSPITAL | Age: 88
Discharge: HOME | End: 2023-06-20
Attending: FAMILY MEDICINE
Payer: MEDICARE

## 2023-06-20 DIAGNOSIS — N17.9 ACUTE KIDNEY INJURY SUPERIMPOSED ON CKD (HCC): ICD-10-CM

## 2023-06-20 DIAGNOSIS — N18.9 ACUTE KIDNEY INJURY SUPERIMPOSED ON CKD (HCC): ICD-10-CM

## 2023-06-20 PROCEDURE — 76775 US EXAM ABDO BACK WALL LIM: CPT

## 2023-06-24 ENCOUNTER — TELEPHONE (OUTPATIENT)
Dept: FAMILY MEDICINE CLINIC | Facility: CLINIC | Age: 88
End: 2023-06-24

## 2023-06-24 NOTE — TELEPHONE ENCOUNTER
[facility]  Hi, this is Slick Mendez, our [employer]  My mother is Marianne Leger, patient of Doctor Ruperto Prado  Her birthday is November 3rd 1931 and you could reach me at this number  I know [phone]  I'm just calling  I would like to set up a my chart, Wyn Card my chart on my phone since she doesn't have Internet or you know, she doesn't have an e-mail  So I just did take her to [facility] for a scan of liver and kidney and I just wanted to know the results  I haven't heard anything from Doctor Ruperto Prado as of yet  I'm looking forward to getting back to you  If you need my e-mail, it is B nimbo ONE [email or web address] so I'd like to set that up so I can get her results on my phone  Thank you so much   Byisis

## 2023-06-26 ENCOUNTER — TELEPHONE (OUTPATIENT)
Dept: FAMILY MEDICINE CLINIC | Facility: CLINIC | Age: 88
End: 2023-06-26

## 2023-06-26 NOTE — TELEPHONE ENCOUNTER
Spoke w dtr--thinks musculoskeletal pain r/t sleeping in chair--pain has since subsided and dtrs were able to help pt up--feeling better after moving around  Recent U/S kidney/bladder wnl except for 9mm renal cyst   Needs follow-up BMP-lab order placed to have done this week

## 2023-06-26 NOTE — TELEPHONE ENCOUNTER
Dr Elvin Mckeon:    Please see message below:      Hi, this is Makenzie Part on [employer]  I'm calling about my mother, Kyle Zacarias, November 1931  She's Doctor Karen's patient  I just need to tell her that my mom's back is really hurting her and she is in bed and she says that she cannot sit up or stand up  I need to talk to her  Haven't heard of results from that  So I do need someone in your office to contact me as soon as possible  Thank you so much, [phone]  Thank you

## 2023-06-30 NOTE — TELEPHONE ENCOUNTER
Please inform rx sent to pharm but if cont or if other sxs--eg chest heaviness/pain, shortness of breath, gi distress then recommend eval at [facility] or ED--thanks

## 2023-06-30 NOTE — TELEPHONE ENCOUNTER
Dr Joshua Sandoval:    Patient's daughter called stating that patient is still experiencing pain in her shoulder area  She's asking if you can send Rx to pharmacy for either muscle relaxer or pain? Please send to [facility] in [place]

## 2023-07-06 NOTE — TELEPHONE ENCOUNTER
Please inform Ibuprofen can affect the BP and her kidney function-better to use tylenol arthritis and arrange for PT--there is [facility] which comes to the home--we can send info to them to call her to help arrange--pleaes forward this back to me after informing daughter so we can get set up--thanks

## 2023-07-06 NOTE — TELEPHONE ENCOUNTER
Pt daughter called stating that pt did not do well on the tramadol it knocked her out all day, can this be changed to ibuprofen. She is also wondering if pt can get home PT.  Pt daughter also thinks pt has a UTI can you put order in for UA reflex to culture that pt can collect and bring to lab

## 2023-07-07 NOTE — TELEPHONE ENCOUNTER
Actually name is [facility] (not [facility])--please call #[phone] to see what they need from us and pt to get evaluation set-up---thanks  Pt having difficulty w back pain and generalized weakness-

## 2023-07-07 NOTE — TELEPHONE ENCOUNTER
Pt's daughter advised to give patient tylenol arthritis. Please forward info to [address].

## 2023-07-09 PROBLEM — E87.5 HYPERKALEMIA: Status: ACTIVE | Noted: 2023-07-09

## 2023-07-09 PROBLEM — N17.9 ACUTE RENAL FAILURE (ARF) (HCC): Status: ACTIVE | Noted: 2023-07-09

## 2023-07-09 PROBLEM — M54.6 ACUTE MIDLINE THORACIC BACK PAIN: Status: ACTIVE | Noted: 2023-07-09

## 2023-07-09 NOTE — ASSESSMENT & PLAN NOTE
Potassium 6.3,    · EKG-pending  · ED bicarb/calcium gluconate/albuterol/insulin/d50  · Telemetry in place  · Nephrology consulted
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
## 2023-07-09 NOTE — ASSESSMENT & PLAN NOTE
Lab Results   Component Value Date    HGBA1C 10.0 (H) 04/24/2023       No results for input(s): "POCGLU" in the last 72 hours.     Blood Sugar Average: Last 72 hrs:  Uncontrolled,    · SSI, transition to basal and prandial  · [id number]  · Carb controlled diet

## 2023-07-09 NOTE — ASSESSMENT & PLAN NOTE
Denied history of osteoporosis    · No point tenderness  · Vitamin D pending  · Lidocaine patch  · As needed Tylenol
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
## 2023-07-09 NOTE — ASSESSMENT & PLAN NOTE
History of CKD,    · Creatinine 6.42  · BUN- 86  · Ultrasound renal-No hydronephrosis. Tiny cyst upper pole of the right kidney.   · ARF panel with complement pending  · UOP  · Trend labs  · Nephrology consulted
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
## 2023-07-10 PROBLEM — E87.5 HYPERKALEMIA: Status: RESOLVED | Noted: 2023-01-01 | Resolved: 2023-01-01

## 2023-07-10 PROBLEM — S22.060A COMPRESSION FRACTURE OF T8 VERTEBRA (HCC): Status: ACTIVE | Noted: 2023-01-01

## 2023-07-10 NOTE — QUICK NOTE
I spoke with Corky Locus (patiemnt's daughter).  Family updated       Leigh Levi MD  Nephrology Attending

## 2023-07-10 NOTE — CONSULTS
e-Consult (IPC)     Inpatient consult to Neurosurgery  Consult performed by: Hussain Colindres PA-C  Consult ordered by: Mikki Michelle MD           Contacted by Gracie. Alexei Marquez 80 y.o. female MRN: [id number]  Unit/Bed#: 913 [facility] 421-01 Encounter: [id number]    Reason for Consult    Per provider report, patient presents with acute onset back pain x1 week. No known history of trauma. No bilateral lower extremity pain, numbness, tingling, or weakness. No bowel or bladder dysfunction. . Available past medical history,social history, surgical history, medication list, drug allergies and review of systems were reviewed. /69   Pulse 88   Temp 98 °F (36.7 °C) (Tympanic)   Resp 16   Ht 4' 11" (1.499 m)   Wt 55 kg (121 lb 3.2 oz)   SpO2 99%   BMI 24.48 kg/m²      Clinical exam per provider report, no midline tenderness, BLE weakness, or sensory deficits. Imaging personally reviewed. CT abdomen/pelvis with contrast, 7/9/23: There is a compression fracture of the T8 vertebra. This is age indeterminant, although not present in 2013. There is partial visualization of a probable compression fracture of T7 as well    Assessment and Recommendations    1. Continue to monitor neurological exam  2. Recommend CT thoracic spine without contrast for further evaluation of mid thoracic fractures  3. TLSO brace as needed for pain control  4. Also recommend upright thoracolumbar x-ray  5. Pain control per primary team  6. Mobilize with PT/OT  7. No transfer indicated at this time  8. NSG will review imaging once completed    All questions answered. Provider is in agreement with the course of action. 11-20 minutes, >50% of the total time devoted to medical consultative verbal/EMR discussion between providers. Written report will be generated in the EMR.

## 2023-07-10 NOTE — PHYSICAL THERAPY NOTE
PHYSICAL THERAPY EVALUATION/TREATMENT     07/10/23 6630   Note Type   Note type Evaluation;Orthotic Management/Training   Type of Brace   Brace Applied Rinard Jeremiah TLSO   Bracing Recommendations Recommendation (comment)  (Brace to be worn when out of bed for pain control)   Education   Education Provided Yes;Family or social support of family present for education by provider   End of Consult   Nurse Communication Nurse aware of consult, application of brace   Pain Assessment   Pain Assessment Tool 0-10   Pain Score No Pain   Restrictions/Precautions   Braces or Orthoses TLSO   Other Precautions Chair Alarm; Bed Alarm; Fall Risk;Pain;Spinal precautions   Home Living   Type of [address] [place] [address] Two level; Able to live on main level with bedroom/bathroom;Stairs to enter with rails  (2 stairs to enter)   Home Equipment Walker;Cane   Additional Comments Patient using roller walker at times at home otherwise holding onto furniture as per family prior to admission   Prior Function   Level of [place] Independent with ADLs; Independent with functional mobility; Needs assistance with IADLS   Lives With Alone   Receives Help From Family   Comments Patient states being independent with ADLs able to get in and out of a bathtub for bathing and doing her own cooking prior to admission. Family is completing shopping and transportation   General   Additional Pertinent History Chart reviewed, patient admitted with back pain, renal failure patient now presents with new finding of T7 and T8 compression fractures and ordered for a TLSO. Patient has been very independent and living at home alone with assist of family on a daily basis prior to admission.   Patient tolerated brace fitting well and patient and daughter both educated in use, donning, doffing, wear time   Family/Caregiver Present Yes  (Daughter present)   Cognition   Overall Cognitive Status WFL   Arousal/Participation Cooperative   Orientation Level Oriented X4   Following Commands Follows all commands and directions without difficulty   Subjective   Subjective Patient states no pain at this time   RLE Assessment   RLE Assessment   (Range of motion within functional limits, strength 3+/5)   LLE Assessment   LLE Assessment   (Range of motion within functional limits, strength 3+/5)   Coordination   Movements are Fluid and Coordinated 0   Coordination and Movement Description Decreased balance and coordination with transfers and gait activity   Bed Mobility   Supine to Sit 4  Minimal assistance   Additional items Assist x 1;LE management   Additional Comments Patient sitting in bedside chair at end of session with brace donned   Transfers   Sit to Stand 4  Minimal assistance   Additional items Assist x 1;Verbal cues   Stand to Sit 4  Minimal assistance   Additional items Assist x 1;Verbal cues   Ambulation/Elevation   Gait Assistance 3  Moderate assist   Additional items Assist x 1;Verbal cues; Tactile cues   Assistive Device Straight cane   Distance 20 feet with change in direction requiring bilateral upper extremity support for balance. Antalgic gait patterning noted with shortened stride length slow gait pattern   Balance   Static Sitting Fair   Dynamic Sitting Fair -   Static Standing Fair -   Dynamic Standing Poor +   Ambulatory Poor +   Activity Tolerance   Activity Tolerance Patient limited by fatigue;Treatment limited secondary to medical complications (Comment)   Nurse Made Aware Yes   Assessment   Prognosis Good   Problem List Decreased strength;Decreased range of motion;Decreased endurance; Impaired balance;Decreased mobility; Decreased coordination;Pain;Orthopedic restrictions   Assessment Patient seen for Physical Therapy evaluation. Patient admitted with <principal problem not specified>. Comorbidities affecting patient's physical performance include: .   Personal factors affecting patient at time of initial evaluation include: ambulating with assistive device, inability to ambulate household distances, inability to navigate community distances, inability to navigate level surfaces without external assistance, inability to perform dynamic tasks in community, limited home support, inability to perform physical activity, inability to perform ADLS and inability to perform IADLS . Prior to admission, patient was independent with functional mobility with walker, independent with ADLS, requiring assist for IADLS, ambulating household distance, ambulating community distances and home with family assist.  Please find objective findings from Physical Therapy assessment regarding body systems outlined above with impairments and limitations including weakness, decreased ROM, impaired balance, decreased endurance, impaired coordination, gait deviations, pain, decreased activity tolerance, decreased functional mobility tolerance and fall risk. The Barthel Index was used as a functional outcome tool presenting with a score of Barthel Index Score: 50 today indicating marked limitations of functional mobility and ADLS. Patient's clinical presentation is currently unstable/unpredictable as seen in patient's presentation of vital sign response, changing level of pain, increased fall risk, new onset of impairment of functional mobility, decreased endurance and new onset of weakness. Pt would benefit from continued Physical Therapy treatment to address deficits as defined above and maximize level of functional mobility. As demonstrated by objective findings, the assigned level of complexity for this evaluation is high. The patient's AM-[facility] Basic Mobility Inpatient Short Form Raw Score is 14. A Raw score of less than or equal to 16 suggests the patient may benefit from discharge to post-acute rehabilitation services although patient has good family support and with improved pain control will be able to return home with home therapy services and 24-hour supervised living.  Please also refer to the recommendation of the Physical Therapist for safe discharge planning. Goals   Patient Goals To go home and remain independent   STG Expiration Date 07/17/23   Short Term Goal #1 Transfers and gait with roller walker with supervision   Short Term Goal #2 Gait endurance to functional household distances   LTG Expiration Date 07/24/23   Long Term Goal #1 Transfers and gait with roller walker independently for functional community distances   Long Term Goal #2 Demonstrate 100% compliance with use of TLSO at all times when out of bed for pain control as needed   Plan   Treatment/Interventions ADL retraining;Functional transfer training;LE strengthening/ROM; Therapeutic exercise; Endurance training;Patient/family training;Equipment eval/education; Bed mobility;Gait training; Compensatory technique education   PT Frequency Other (Comment)  (5 times per week)   Recommendation   PT Discharge Recommendation Home with home health rehabilitation   Additional Comments Patient's daughter stating that family will be able to stay with patient and provide assist and supervision as needed   AM-PAC Basic Mobility Inpatient   Turning in Flat Bed Without Bedrails 3   Lying on Back to Sitting on Edge of Flat Bed Without Bedrails 3   Moving Bed to Chair 2   Standing Up From Chair Using Arms 3   Walk in Room 2   Climb 3-5 Stairs With Railing 1   Basic Mobility Inpatient Raw Score 14   Basic Mobility Standardized Score 35.55   Highest Level Of Mobility   -HLM Goal 4: Move to chair/commode   -HLM Achieved 7: Walk 25 feet or more   Barthel Index   Feeding 10   Bathing 0   Grooming Score 0   Dressing Score 5   Bladder Score 10   Bowels Score 10   Toilet Use Score 5   Transfers (Bed/Chair) Score 10   Mobility (Level Surface) Score 0   Stairs Score 0   Barthel Index Score 50   Additional Treatment Session   Start Time 1325   End Time 1340   Treatment Assessment S: Patient without pain at this time O: Bilateral lower extremity exercise completed as listed below.   Gait training with roller walker with min assist for 10 feet while TLSO donned A: Patient tolerating use of TLSO well also tolerated use of roller walker with improved gait balance and endurance and will benefit from continued physical therapy with progression as tolerated   Exercises   Hip Flexion Sitting;5 reps;Bilateral   Hip Abduction Sitting;10 reps;Bilateral   Knee AROM Long Arc Quad Sitting;10 reps;Bilateral   Balance training  Sidestepping and backward walking completed for balance coordination   Licensure   [place] License Number  Deedee Avendano, PT 4 0 QA [id number]

## 2023-07-10 NOTE — QUICK NOTE
Repeat labs reviewed. Serum potassium remains within normal limits. Due to low total and ionized calcium, we will discontinue sodium bicarbonate drip to avoid further worsening of ionized calcium. For now, switch to Isolyte at 100 cc/h. Trend BMP.

## 2023-07-10 NOTE — PROGRESS NOTES
Detail Level: Detailed NEPHROLOGY PROGRESS NOTE   [facility] 80 y.o. female MRN: [id number]  Unit/Bed#: [address] [place] [address] Encounter: [id number]  Reason for Consult: JOSE    ASSESSMENT AND PLAN:  81 yo woman with PMH of UTI, urinary incontinence,  hypothyroidism, diabetes p/w back pain. Nephrology is consulted for management of JOSE    PLAN:    #Non-Oliguric KDIGO JOSE stage 3 (severe)  • Etiology: Possible secondary to ATN vs proliferative glomerulonephritis (MGRS vs post-infection GN vs complement dysregulation related GN)   • Baseline creatinine: 0.8 mg/dL  • Current creatinine: 5.82 mg/dL trending down  • Peak creatinine: 6.02 mg/dL  • UA: Hematuria, leukocyturia, bacteriuria  • UACr: 1.4 g/g  • Renal imaging : No hydronephrosis  • Treatment:  • UPCR  • No urgent indication of dialysis at this time  • Patient does not want to proceed with dialysis (discussed with Dr. Carmelo Zambrano and myself)  • Maintain MAP:  Over 65 mmHg if possible/avoid hypoperfusion:  Hold parameters on blood pressure medications  • Avoid nephrotoxic agents such as NSAIDs, and IV contrast if possible. Avoid opioids   • Adjust medications to GFR  • GN work-up:  o C3 low (possible postinfection, DDD, MGRS)  o ANCA, anti-GBM, TAMIKO, SPEP, kappa lambda ratio pending  o UPCR    #Acid-base Disorder  • serum HCO3 16 mmol/L  • A  • Non-anion gap metabolic acidosis  • Secondary to JOSE  • Start sodium bicarb 650 mg twice daily    #Volume status/hypertension:  • Volume: Euvolemic  • Blood pressure: Normotensive /69mm,  Goal<140/90  • Recommend:  • Decrease IV fluids to 75 mils per hour, plan to discontinue tonight  • Enforce fluid intake    #Anemia:  • Current hemoglobin: 7.2 mg/dL  • Treatment:  • Transfuse for hemoglobin less than 7.0 per primary service      #Hypocalcemia  · Ionized calcium   · 1 at goal    #Borderline hyperphosphatemia   · Phosphorus 5.4 mg/dL, goal <5.5   · Low phosphorus diet      SUBJECTIVE:  Patient seen and examined at bedside.  No chest pain, shortness of breath, nausea, vomiting, abdominal pain or diarrhea.      OBJECTIVE:  Current Weight: Weight - Scale: 55 kg (121 lb 3.2 oz)  Vitals:    07/10/23 0750   BP: 122/69   Pulse:    Resp:    Temp:    SpO2:        Intake/Output Summary (Last 24 hours) at 7/10/2023 0913  Last data filed at 7/10/2023 4750  Gross per 24 hour   Intake 1332.5 ml   Output 500 ml   Net 832.5 ml     Wt Readings from Last 3 Encounters:   07/09/23 55 kg (121 lb 3.2 oz)   04/25/23 61 kg (134 lb 6.4 oz)   10/11/22 62.5 kg (137 lb 12.8 oz)     Temp Readings from Last 3 Encounters:   07/10/23 97.6 °F (36.4 °C)   04/25/23 97.9 °F (36.6 °C)   10/11/22 98.3 °F (36.8 °C)     BP Readings from Last 3 Encounters:   07/10/23 122/69   04/25/23 138/68   10/11/22 136/68     Pulse Readings from Last 3 Encounters:   07/10/23 88   04/25/23 64   10/11/22 70        General:  no acute distress at this time  Skin:  No acute rash  Eyes:  No scleral icterus and noninjected  ENT:  mucous membranes moist  Neck:  no carotid bruits  Chest:  Clear to auscultation percussion, good respiratory effort, no use of accessory respiratory muscles  CVS:  Regular rate and rhythm without rub   Abdomen:  soft and nontender   Extremities: no significant lower extremity edema  Neuro:  No gross focality  Psych:  Alert , cooperative     Medications:    Current Facility-Administered Medications:   •  acetaminophen (TYLENOL) tablet 650 mg, 650 mg, Oral, Q6H PRN, Ryanne Wang MD, 650 mg at 07/09/23 1925  •  acetaminophen (TYLENOL) tablet 975 mg, 975 mg, Oral, Q8H [facility], CLARITA Diaz, 975 mg at 07/10/23 9795  •  cholecalciferol (VITAMIN D3) tablet 400 Units, 400 Units, Oral, Daily, Ryanne Wang MD  •  docusate sodium (COLACE) capsule 100 mg, 100 mg, Oral, BID, La Galvan, RTINP, 100 mg at 07/09/23 2137  •  gabapentin (NEURONTIN) capsule 100 mg, 100 mg, Oral, HS, La Galvan CRNP, 100 mg at 07/09/23 2137  •  heparin (porcine) subcutaneous injection 5,000 Units, 5,000 Units, Subcutaneous, Q8H [address], 5,000 Units at 07/10/23 1269 **AND** [COMPLETED] Platelet count, , , Once, Harrison Cali MD  •  HYDROmorphone (DILAUDID) injection 0.2 mg, 0.2 mg, Intravenous, Q4H PRN, CLARITA Diaz, 0.2 mg at 07/09/23 1955  •  insulin lispro (HumaLOG) 100 units/mL subcutaneous injection 1-5 Units, 1-5 Units, Subcutaneous, TID AC **AND** Fingerstick Glucose (POCT), , , TID AC, Harrison Cali MD  •  levothyroxine tablet 25 mcg, 25 mcg, Oral, QAM, Harrison Cali MD, 25 mcg at 07/10/23 0837  •  lidocaine (LIDODERM) 5 % patch 1 patch, 1 patch, Topical, Daily, CLARITA Diaz, 1 patch at 07/10/23 0837  •  multi-electrolyte (PLASMALYTE-A/ISOLYTE-S PH 7.4) IV solution, 75 mL/hr, Intravenous, Continuous, Cony Suárez MD, Last Rate: 75 mL/hr at 07/10/23 0831, 75 mL/hr at 07/10/23 0831  •  ondansetron (ZOFRAN) injection 4 mg, 4 mg, Intravenous, Q6H PRN, Harrison Cali MD  •  oxyCODONE (ROXICODONE) split tablet 2.5 mg, 2.5 mg, Oral, Q8H PRN, CLARITA Diaz, 2.5 mg at 07/09/23 2143  •  sodium bicarbonate tablet 650 mg, 650 mg, Oral, BID after meals, Cony Suárez MD    Laboratory Results:  Results from last 7 days   Lab Units 07/10/23  0536 07/09/23  2155 07/09/23  1734 07/09/23  1608   WBC Thousand/uL 4.79  --   --  6.99   HEMOGLOBIN g/dL 7.2*  --   --  8.8*   HEMATOCRIT % 22.1*  --   --  27.1*   PLATELETS Thousands/uL 137*  --  164 189   SODIUM mmol/L 141 140 140 138   POTASSIUM mmol/L 4.8 5.0 5.2 6.3*   CHLORIDE mmol/L 112* 111* 112* 109*   CO2 mmol/L 16* 15* 13* 13*   BUN mg/dL 78* 81* 82* 86*   CREATININE mg/dL 5.82* 6.02* 5.91* 6.42*   CALCIUM mg/dL 7.5* 7.7* 7.7* 8.5   MAGNESIUM mg/dL 2.5  --   --   --    PHOSPHORUS mg/dL 5.4*  --   --   --        CT renal stone study abdomen pelvis without contrast   Final Result by Case Berg MD (07/09 9841)      1. No evidence of hydronephrosis or urinary tract calculus. 2. No acute inflammatory changes in the abdomen or pelvis   3. Compression fracture of the T8, and partial visualization of probable T7 compression fracture. Age indeterminant, but not present in 2013               Workstation performed: [id number]             Portions of the record may have been created with voice recognition software. Occasional wrong word or "sound a like" substitutions may have occurred due to the inherent limitations of voice recognition software. Read the chart carefully and recognize, using context, where substitutions have occurred.

## 2023-07-10 NOTE — PLAN OF CARE
Problem: MOBILITY - ADULT  Goal: Maintain or return to baseline ADL function  Description: INTERVENTIONS:  -  Assess patient's ability to carry out ADLs; assess patient's baseline for ADL function and identify physical deficits which impact ability to perform ADLs (bathing, care of mouth/teeth, toileting, grooming, dressing, etc.)  - Assess/evaluate cause of self-care deficits   - Assess range of motion  - Assess patient's mobility; develop plan if impaired  - Assess patient's need for assistive devices and provide as appropriate  - Encourage maximum independence but intervene and supervise when necessary  - Involve family in performance of ADLs  - Assess for home care needs following discharge   - Consider OT consult to assist with ADL evaluation and planning for discharge  - Provide patient education as appropriate  Outcome: Progressing     Problem: PAIN - ADULT  Goal: Verbalizes/displays adequate comfort level or baseline comfort level  Description: Interventions:  - Encourage patient to monitor pain and request assistance  - Assess pain using appropriate pain scale  - Administer analgesics based on type and severity of pain and evaluate response  - Implement non-pharmacological measures as appropriate and evaluate response  - Consider cultural and social influences on pain and pain management  - Notify physician/advanced practitioner if interventions unsuccessful or patient reports new pain  Outcome: Progressing     Problem: METABOLIC, FLUID AND ELECTROLYTES - ADULT  Goal: Electrolytes maintained within normal limits  Description: INTERVENTIONS:  - Monitor labs and assess patient for signs and symptoms of electrolyte imbalances  - Administer electrolyte replacement as ordered  - Monitor response to electrolyte replacements, including repeat lab results as appropriate  - Instruct patient on fluid and nutrition as appropriate  Outcome: Progressing

## 2023-07-11 ENCOUNTER — TELEPHONE (OUTPATIENT)
Dept: NEUROSURGERY | Facility: CLINIC | Age: 88
End: 2023-07-11

## 2023-07-11 NOTE — TELEPHONE ENCOUNTER
07/14/2023-CALLED PT, SPOKE TO SIDDHARTHA (PT'S DAUGHTER), CONFIRMED 07/28/2023 APT IN [facility] AND TO COMPLETE XRAY PRIOR TO APT.      07/13/2023-PT STILL IN [facility]    07/11/2023-PT STILL IN [facility].  07/28/2023 APT IN [facility] W/THORACIC SPINE XRAY      Pepe Rock PA-C  Please schedule 2 week f/u with Xr. My note says PRN but she now has increased back pain so will do 2 week f/u to check on her.

## 2023-07-11 NOTE — PROGRESS NOTES
[address]  Progress Note  Name: Yee Topete  MRN: [id number]  Unit/Bed#: [address] [address] Date of Admission: 7/9/2023   Date of Service: 7/10/2023  Hospital Day: 1    Assessment/Plan   * Acute renal failure (ARF) ([address])  Assessment & Plan  History of CKD,    · Creatinine 6.42 >5 0.82  · BUN- 86 >78  · Ultrasound renal-No hydronephrosis. Tiny cyst upper pole of the right kidney. · ARF panel with complement pending  · UOP  · Trend labs  · Nephrology recs:  · GN work-up:  ? C3 low (possible postinfection, DDD, MGRS)  ? ANCA, anti-GBM, TAMIKO, SPEP, kappa lambda ratio pending  ?  UPCR      Hyperkalemia-resolved as of 7/10/2023  Assessment & Plan  Potassium 6.3 > 4.8    · EKG-pending  · ED bicarb/calcium gluconate/albuterol/insulin/d50  · Telemetry in place  · Nephrology consulted      Compression fracture of T8 vertebra (HCC)  Assessment & Plan  Denied history of osteoporosis    · No point tenderness  · Vitamin D-slight decrease, replenish vitamin D/C  · Lidocaine patch  · As needed Tylenol  · Neurosurgery recs:  · CT thoracic spine without contrast-pending read  · CXR upright thoracic spine-pending   ·  brace as needed for pain    Hypothyroidism  Assessment & Plan  Chronic,       · TSH/free T4 pending  · continue home levothyroxine 25 mcg    Insomnia  Assessment & Plan  · Melatonin as needed    Type 2 diabetes mellitus with hyperglycemia, without long-term current use of insulin [facility])  Assessment & Plan  Lab Results   Component Value Date    HGBA1C 10.0 (H) 04/24/2023       Recent Labs     07/10/23  0726 07/10/23  1117 07/10/23  1608 07/10/23  2032   POCGLU 100 206* 172* 181*       Blood Sugar Average: Last 72 hrs:  (P) 166.4Uncontrolled,    · SSI, transition to basal and prandial  · [id number]  · Carb controlled diet               VTE Pharmacologic Prophylaxis:   Pharmacologic: Heparin  Mechanical VTE Prophylaxis in Place: Yes    Patient Centered Rounds:  I performed bedside rounds with nursing staff today. Discussions with Specialists or Other Care Team Provider: Nephrology, neurosurgery    Education and Discussions with Family / Patient: Both daughters at bedside    Time Spent for Care:  45 minutes This time was spent on one or more of the following: performing physical exam; counseling and coordination of care; obtaining or reviewing history; documenting in the medical record; reviewing/ordering tests, medications or procedures; communicating with other healthcare professionals and discussing with patient's family/caregivers. Current Length of Stay: 1 day(s)    Current Patient Status: Inpatient   Certification Statement: Clinical course and specialist recommendations    Discharge Plan: Anticipate discharge in 24-48 hrs to discharge location to be determined pending rehab evaluations. Code Status: Level 1 - Full Code      Subjective:   Patient seen and examined at bedside both daughters present, reported no back pain, that she want to leave the hospital denied any symptoms of weakness chest pain palpitations or abdominal pain. Patient's daughters and patient informed of plan of care, nephrology's recs for discharge and neurosurgery's recommendations. Objective:     Vitals:   Temp (24hrs), Av.8 °F (36.6 °C), Min:97.4 °F (36.3 °C), Max:98.7 °F (37.1 °C)    Temp:  [97.4 °F (36.3 °C)-98.7 °F (37.1 °C)] 97.5 °F (36.4 °C)  HR:  [63-91] 91  Resp:  [16-18] 18  BP: ()/(48-72) 142/70  SpO2:  [98 %-100 %] 98 %  Body mass index is 24.48 kg/m². Input and Output Summary (last 24 hours): Intake/Output Summary (Last 24 hours) at 7/10/2023 2201  Last data filed at 7/10/2023 1508  Gross per 24 hour   Intake 1380 ml   Output 1150 ml   Net 230 ml       Physical Exam:     Physical Exam  Vitals reviewed. Constitutional:       General: She is not in acute distress. Appearance: Normal appearance. HENT:      Head: Atraumatic. Nose: No congestion.    Eyes:      General: No scleral icterus. Pupils: Pupils are equal, round, and reactive to light. Cardiovascular:      Rate and Rhythm: Normal rate. Heart sounds: No murmur heard. Pulmonary:      Effort: No respiratory distress. Breath sounds: No wheezing, rhonchi or rales. Abdominal:      Palpations: Abdomen is soft. Tenderness: There is no abdominal tenderness. There is no guarding. Musculoskeletal:         General: No swelling or deformity. Normal range of motion. Cervical back: No tenderness or bony tenderness. Thoracic back: No spasms, tenderness or bony tenderness. Lumbar back: No tenderness or bony tenderness. Right lower leg: No edema. Left lower leg: No edema. Comments: No paraspinal tenderness   Feet:      Right foot:      Skin integrity: No callus. Skin:     General: Skin is warm. Capillary Refill: Capillary refill takes less than 2 seconds. Findings: No lesion or rash. Neurological:      Mental Status: She is oriented to person, place, and time. Cranial Nerves: No cranial nerve deficit. Coordination: Coordination normal.   Psychiatric:         Mood and Affect: Mood normal.         Thought Content: Thought content normal.         Additional Data:     Labs:    Results from last 7 days   Lab Units 07/10/23  0536 07/09/23  1734 07/09/23  1608   WBC Thousand/uL 4.79  --  6.99   HEMOGLOBIN g/dL 7.2*  --  8.8*   HEMATOCRIT % 22.1*  --  27.1*   PLATELETS Thousands/uL 137*   < > 189   NEUTROS PCT %  --   --  58   LYMPHS PCT %  --   --  28   MONOS PCT %  --   --  11   EOS PCT %  --   --  2    < > = values in this interval not displayed. Results from last 7 days   Lab Units 07/10/23  0536   POTASSIUM mmol/L 4.8   CHLORIDE mmol/L 112*   CO2 mmol/L 16*   BUN mg/dL 78*   CREATININE mg/dL 5.82*   CALCIUM mg/dL 7.5*   ALK PHOS U/L 39   ALT U/L 10   AST U/L 12*           * I Have Reviewed All Lab Data Listed Above. * Additional Pertinent Lab Tests Reviewed:  No correct      Imaging:  Imaging Reports Reviewed Today Include: CT      Recent Cultures (last 7 days):           Last 24 Hours Medication List:   Current Facility-Administered Medications   Medication Dose Route Frequency Provider Last Rate   • acetaminophen  650 mg Oral Q6H PRN Rula Biggs MD     • acetaminophen  975 mg Oral Q8H [address] CLARITA Diaz     • calcium carbonate-vitamin D  1 tablet Oral BID With Meals Rula Biggs MD     • docusate sodium  100 mg Oral BID CLARITA Diaz     • gabapentin  100 mg Oral HS CLARITA Diaz     • heparin (porcine)  5,000 Units Subcutaneous Q8H [address] Rula Biggs MD     • HYDROmorphone  0.2 mg Intravenous Q4H PRN CLARITA Diaz     • insulin lispro  1-5 Units Subcutaneous TID AC Rula Biggs MD     • levothyroxine  25 mcg Oral QAM Rula Biggs MD     • lidocaine  1 patch Topical Daily CLARITA Diaz     • ondansetron  4 mg Intravenous Q6H PRN Rula Biggs MD     • oxyCODONE  2.5 mg Oral Q8H PRN CLARITA Diaz     • sodium bicarbonate  650 mg Oral BID after meals Masoud Gipson MD            Today, Patient Was Seen By: Rula Biggs MD    ** Please Note: "This note has been constructed using a voice recognition system. Therefore there may be syntax, spelling, and/or grammatical errors.  Please call if you have any questions. "**

## 2023-07-11 NOTE — ASSESSMENT & PLAN NOTE
History of CKD,    · Creatinine 6.42 >5 0.82  · BUN- 86 >78  · Ultrasound renal-No hydronephrosis. Tiny cyst upper pole of the right kidney. · ARF panel with complement pending  · UOP  · Trend labs  · Nephrology recs:  · GN work-up:  ? C3 low (possible postinfection, DDD, MGRS)  ? ANCA, anti-GBM, TAMIKO, SPEP, kappa lambda ratio pending  ?  UPCR

## 2023-07-11 NOTE — PLAN OF CARE
Problem: MOBILITY - ADULT  Goal: Maintain or return to baseline ADL function  Description: INTERVENTIONS:  -  Assess patient's ability to carry out ADLs; assess patient's baseline for ADL function and identify physical deficits which impact ability to perform ADLs (bathing, care of mouth/teeth, toileting, grooming, dressing, etc.)  - Assess/evaluate cause of self-care deficits   - Assess range of motion  - Assess patient's mobility; develop plan if impaired  - Assess patient's need for assistive devices and provide as appropriate  - Encourage maximum independence but intervene and supervise when necessary  - Involve family in performance of ADLs  - Assess for home care needs following discharge   - Consider OT consult to assist with ADL evaluation and planning for discharge  - Provide patient education as appropriate  Outcome: Progressing  Goal: Maintains/Returns to pre admission functional level  Description: INTERVENTIONS:  - Perform BMAT or MOVE assessment daily.   - Set and communicate daily mobility goal to care team and patient/family/caregiver.    - Collaborate with rehabilitation services on mobility goals  - Out of bed for toileting  - Record patient progress and toleration of activity level   Outcome: Progressing     Problem: Prexisting or High Potential for Compromised Skin Integrity  Goal: Skin integrity is maintained or improved  Description: INTERVENTIONS:  - Identify patients at risk for skin breakdown  - Assess and monitor skin integrity  - Assess and monitor nutrition and hydration status    - Turn and reposition patient  - Assist with mobility/ambulation    Outcome: Progressing     Problem: PAIN - ADULT  Goal: Verbalizes/displays adequate comfort level or baseline comfort level  Description: Interventions:  - Encourage patient to monitor pain and request assistance  - Assess pain using appropriate pain scale  - Administer analgesics based on type and severity of pain and evaluate response  - Implement non-pharmacological measures as appropriate and evaluate response  - Consider cultural and social influences on pain and pain management  - Notify physician/advanced practitioner if interventions unsuccessful or patient reports new pain  Outcome: Progressing

## 2023-07-11 NOTE — ASSESSMENT & PLAN NOTE
Denied history of osteoporosis    · No point tenderness  · Vitamin D-slight decrease, replenish vitamin D/C  · Lidocaine patch  · As needed Tylenol  · Neurosurgery recs:  · CT thoracic spine without contrast-pending read  · CXR upright thoracic spine-pending   · PRATEEK brace as needed for pain

## 2023-07-11 NOTE — PLAN OF CARE
Problem: MOBILITY - ADULT  Goal: Maintain or return to baseline ADL function  Description: INTERVENTIONS:  -  Assess patient's ability to carry out ADLs; assess patient's baseline for ADL function and identify physical deficits which impact ability to perform ADLs (bathing, care of mouth/teeth, toileting, grooming, dressing, etc.)  - Assess/evaluate cause of self-care deficits   - Assess range of motion  - Assess patient's mobility; develop plan if impaired  - Assess patient's need for assistive devices and provide as appropriate  - Encourage maximum independence but intervene and supervise when necessary  - Involve family in performance of ADLs  - Assess for home care needs following discharge   - Consider OT consult to assist with ADL evaluation and planning for discharge  - Provide patient education as appropriate  Outcome: Progressing     Problem: Prexisting or High Potential for Compromised Skin Integrity  Goal: Skin integrity is maintained or improved  Description: INTERVENTIONS:  - Identify patients at risk for skin breakdown  - Assess and monitor skin integrity  - Assess and monitor nutrition and hydration status    - Turn and reposition patient  - Assist with mobility/ambulation    Outcome: Progressing     Problem: PAIN - ADULT  Goal: Verbalizes/displays adequate comfort level or baseline comfort level  Description: Interventions:  - Encourage patient to monitor pain and request assistance  - Assess pain using appropriate pain scale  - Administer analgesics based on type and severity of pain and evaluate response  - Implement non-pharmacological measures as appropriate and evaluate response  - Consider cultural and social influences on pain and pain management  - Notify physician/advanced practitioner if interventions unsuccessful or patient reports new pain  Outcome: Progressing     Problem: Nutrition/Hydration-ADULT  Goal: Nutrient/Hydration intake appropriate for improving, restoring or maintaining nutritional needs  Description: Monitor and assess patient's nutrition/hydration status for malnutrition. Collaborate with interdisciplinary team and initiate plan and interventions as ordered. Monitor patient's weight and dietary intake as ordered or per policy. Utilize nutrition screening tool and intervene as necessary. Determine patient's food preferences and provide high-protein, high-caloric foods as appropriate.      INTERVENTIONS:  - Monitor oral intake, urinary output, labs, and treatment plans  - Assess nutrition and hydration status and recommend course of action  - Evaluate amount of meals eaten  - Assist patient with eating if necessary   - Allow adequate time for meals  - Recommend/ encourage appropriate diets, oral nutritional supplements, and vitamin/mineral supplements  - Order, calculate, and assess calorie counts as needed  - Recommend, monitor, and adjust tube feedings and TPN/PPN based on assessed needs  - Assess need for intravenous fluids  - Provide specific nutrition/hydration education as appropriate  - Include patient/family/caregiver in decisions related to nutrition  Outcome: Progressing

## 2023-07-11 NOTE — PROGRESS NOTES
Upright thoracic Xr personally reviewed, showing stable appearance of T8 fracture. Additionally, T7 fracture is now better visualized with at least 50% loss of height. All age indeterminate.      Plan:  · Given lack of back pain and point tenderness, recommend TLSO brace PRN for pain control  · Continue to monitor neurological exam  · Pain control per primary team  · Follow up in our office on an as needed basis  · Call with questions or concerns

## 2023-07-11 NOTE — ASSESSMENT & PLAN NOTE
Potassium 6.3 > 4.8    · EKG-pending  · ED bicarb/calcium gluconate/albuterol/insulin/d50  · Telemetry in place  · Nephrology consulted

## 2023-07-11 NOTE — ASSESSMENT & PLAN NOTE
Lab Results   Component Value Date    HGBA1C 10.0 (H) 04/24/2023       Recent Labs     07/10/23  0726 07/10/23  1117 07/10/23  1608 07/10/23  2032   POCGLU 100 206* 172* 181*       Blood Sugar Average: Last 72 hrs:  (P) 166.4Uncontrolled,    · SSI, transition to basal and prandial  · [id number]  · Carb controlled diet

## 2023-07-11 NOTE — DISCHARGE INSTR - AVS FIRST PAGE
Neurosurgery discharge instructions following spine fracture:     TLSO brace to be worn when out of bed of head of bed greater than 45 degrees. May place brace on while sitting on edge of bed. May be removed for showering. No bending, twisting or heavy lifting. No strenuous activities. No Driving. Follow-up as scheduled in two weeks with repeat spine x-rays to be completed 2-3 days prior to visit. Prescription has been entered electronically. **Please notify MD immediately if you have increased back or leg pain. New numbness, tingling, weakness in your legs and/or bowel/bladder incontinence**      Take Tylenol scheduled for the next week and as the pain continues to improve you can use it on a as needed basis    Hold oxycodone, Robaxin if patient starts getting lethargic or sedated    Your folate level was low and your vitamin B-12 level was low normal.  You have been started on supplementation which you can  over-the-counter.   If sublingual vitamin B12 is not available, feel free to take pill instead

## 2023-07-12 NOTE — PLAN OF CARE
Problem: MOBILITY - ADULT  Goal: Maintain or return to baseline ADL function  Description: INTERVENTIONS:  -  Assess patient's ability to carry out ADLs; assess patient's baseline for ADL function and identify physical deficits which impact ability to perform ADLs (bathing, care of mouth/teeth, toileting, grooming, dressing, etc.)  - Assess/evaluate cause of self-care deficits   - Assess range of motion  - Assess patient's mobility; develop plan if impaired  - Assess patient's need for assistive devices and provide as appropriate  - Encourage maximum independence but intervene and supervise when necessary  - Involve family in performance of ADLs  - Assess for home care needs following discharge   - Consider OT consult to assist with ADL evaluation and planning for discharge  - Provide patient education as appropriate  Outcome: Progressing  Goal: Maintains/Returns to pre admission functional level  Description: INTERVENTIONS:  - Perform BMAT or MOVE assessment daily.   - Set and communicate daily mobility goal to care team and patient/family/caregiver.    - Collaborate with rehabilitation services on mobility goals  - Out of bed for toileting  - Record patient progress and toleration of activity level   Outcome: Progressing     Problem: Prexisting or High Potential for Compromised Skin Integrity  Goal: Skin integrity is maintained or improved  Description: INTERVENTIONS:  - Identify patients at risk for skin breakdown  - Assess and monitor skin integrity  - Assess and monitor nutrition and hydration status    - Turn and reposition patient  - Assist with mobility/ambulation    Outcome: Progressing     Problem: PAIN - ADULT  Goal: Verbalizes/displays adequate comfort level or baseline comfort level  Description: Interventions:  - Encourage patient to monitor pain and request assistance  - Assess pain using appropriate pain scale  - Administer analgesics based on type and severity of pain and evaluate response  - Implement non-pharmacological measures as appropriate and evaluate response  - Consider cultural and social influences on pain and pain management  - Notify physician/advanced practitioner if interventions unsuccessful or patient reports new pain  Outcome: Progressing     Problem: METABOLIC, FLUID AND ELECTROLYTES - ADULT  Goal: Electrolytes maintained within normal limits  Description: INTERVENTIONS:  - Monitor labs and assess patient for signs and symptoms of electrolyte imbalances  - Administer electrolyte replacement as ordered  - Monitor response to electrolyte replacements, including repeat lab results as appropriate  - Instruct patient on fluid and nutrition as appropriate  Outcome: Progressing     Problem: Nutrition/Hydration-ADULT  Goal: Nutrient/Hydration intake appropriate for improving, restoring or maintaining nutritional needs  Description: Monitor and assess patient's nutrition/hydration status for malnutrition. Collaborate with interdisciplinary team and initiate plan and interventions as ordered. Monitor patient's weight and dietary intake as ordered or per policy. Utilize nutrition screening tool and intervene as necessary. Determine patient's food preferences and provide high-protein, high-caloric foods as appropriate.      INTERVENTIONS:  - Monitor oral intake, urinary output, labs, and treatment plans  - Assess nutrition and hydration status and recommend course of action  - Evaluate amount of meals eaten  - Assist patient with eating if necessary   - Allow adequate time for meals  - Recommend/ encourage appropriate diets, oral nutritional supplements, and vitamin/mineral supplements  - Order, calculate, and assess calorie counts as needed  - Recommend, monitor, and adjust tube feedings and TPN/PPN based on assessed needs  - Assess need for intravenous fluids  - Provide specific nutrition/hydration education as appropriate  - Include patient/family/caregiver in decisions related to nutrition  Outcome: Progressing

## 2023-07-12 NOTE — ASSESSMENT & PLAN NOTE
Possibly due to osteoporosis  · Vitamin D-slight decreased, started on vitamin D/C  · Lidocaine patch, scheduled Tylenol and oxycodone prn  · D/W Neurosurgery again today who reviewed Xray and will continue with TLSO brace prn OOB  · F/U with neurosurgery prn

## 2023-07-12 NOTE — PHYSICAL THERAPY NOTE
PT TREATMENT     07/12/23 9480   Note Type   Note Type Treatment   Type of Brace   Brace [employer] TLSO   Patient Position When Brace Applied Seated   Education   Education Provided Yes to pt's daughter, see below   Pain Assessment   Pain Assessment Tool 0-10   Pain Score 8  (Pt had pain meds prior to PT)   Pain Location/Orientation Location: Back   Restrictions/Precautions   Braces or Orthoses TLSO   Other Precautions Pain; Fall Risk;Bed Alarm; Chair Alarm   General   Chart Reviewed Yes   Cognition   Overall Cognitive Status WFL   Arousal/Participation Cooperative   Following Commands Follows one step commands with increased time or repetition   Subjective   Subjective "My back hurts"   Transfers   Sit to Stand 3  Moderate assistance   Stand to Sit 4  Minimal assistance   Stand pivot 3  Moderate assistance   Additional items Increased time required;Verbal cues  (walker)   Ambulation/Elevation   Gait pattern   (flexed posture)   Gait Assistance 4  Minimal assist   Assistive Device Rolling walker   Distance 2x15 feet   Balance   Static Sitting Fair   Static Standing Fair -   Activity Tolerance   Activity Tolerance Patient limited by pain; Patient limited by fatigue   Assessment   Prognosis Good   Problem List Decreased strength;Decreased range of motion; Impaired balance;Decreased mobility; Decreased endurance;Pain;Orthopedic restrictions   Assessment Pt demonstrates improved tolerance to activity today after getting pain meds prior to PT. Pt noted to be more comfortable in general when sitting in the chair and was able to ambulate to the bathroom and back with a walker. Pt asked to remove the brace in the chair which this PT did. Plan to continue PT and encourage brace use if tolerated. The patient's AM-PAC Basic Mobility Inpatient Short Form Raw Score is 13.  A Raw score of less than or equal to 16 suggests the patient may benefit from discharge to post-acute rehabilitation services however pt's family can care for pt upon DC so recommend home PT with 24 hour assist.       Plan   Treatment/Interventions Functional transfer training;LE strengthening/ROM; Elevations; Therapeutic exercise; Endurance training;Gait training;Bed mobility   Progress Progressing toward goals   PT Frequency Other (Comment)  (5x/week)   Recommendation   PT Discharge Recommendation   (24 hour assist)   Equipment Recommended   (Pt has a walker)   AM-PAC Basic Mobility Inpatient   Turning in Flat Bed Without Bedrails 3   Lying on Back to Sitting on Edge of Flat Bed Without Bedrails 3   Moving Bed to Chair 2   Standing Up From Chair Using Arms 2   Walk in Room 2   Climb 3-5 Stairs With Railing 1   Basic Mobility Inpatient Raw Score 13   Basic Mobility Standardized Score 33.99   Highest Level Of Mobility   -HL Goal 4: Move to chair/commode   JH-HLM Achieved 7: Walk 25 feet or more   End of Consult   End of Consult Comments Pt's daughter educated that the brace is for comfort and not absolutely necessary. Pt's daughter educated that the purpose of the brace is to promote thoracic extension so if  patient does not want to wear the brace patient should try to sit upright and avoid bending forward. Patient's daughter instructed in how to don and doff the brace and how to adjust it so it fit snugly.    Licensure   [place] License Number  Nisreen LEWIS [id number]

## 2023-07-12 NOTE — CASE MANAGEMENT
Case Management Assessment & Discharge Planning Note    Patient name Sagrario Dupree  Location [address] MRN [id number]  : 11/3/1931 Date 2023       Current Admission Date: 2023  Current Admission Diagnosis:Acute renal failure (ARF) [facility])   Patient Active Problem List    Diagnosis Date Noted   • Acute renal failure (ARF) ([address]) 2023   • Compression fracture of T8 vertebra ([address]) 2023   • Acute kidney injury superimposed on CKD ([address]) 2023   • Chronic renal disease, stage IV ([address]) 2023   • Other fatigue 01/15/2022   • Leg edema 01/15/2022   • Hypothyroidism due to medication 2021   • Benign essential microscopic hematuria 2021   • BMI 31.0-31.9,adult 2021   • Uncontrolled type 2 diabetes mellitus with hyperglycemia ([address]) 2021   • Abnormal urinalysis 2020   • Leg swelling 2020   • Need for vaccination with 13-polyvalent pneumococcal conjugate vaccine 2020   • Primary osteoarthritis of left ankle 2020   • Bilateral primary osteoarthritis of knee 2020   • Hypothyroidism 2020   • BMI 27.0-27.9,adult 2020   • Type 2 diabetes mellitus with hyperglycemia, without long-term current use of insulin ([address]) 2016   • Insomnia 2016   • Hyperlipidemia 2015   • Abnormal blood sugar 2015   • Pain in joint 07/10/2015      LOS (days): 3  Geometric Mean LOS (GMLOS) (days): 5.10  Days to GMLOS:2.1     OBJECTIVE:    Risk of Unplanned Readmission Score: 16.36         Current admission status: Inpatient       Preferred Pharmacy:   [facility] [address] [facility] [address] [place] [address]  Phone: [phone] Fax: [phone]    Primary Care Provider: Dev Wade MD    Primary Insurance: MEDICARE  Secondary Insurance: BLUE CROSS    ASSESSMENT:  Jeanette Proxies    There are no active Health Care Proxies on file.        Readmission Root Cause  30 Day Readmission: No    Patient Information  Admitted from[de-identified] Home  Mental Status: Alert  During Assessment patient was accompanied by: Daughter  Assessment information provided by[de-identified] Daughter  Primary Caregiver: Family  Caregiver's Name[de-identified] Daughter Carey Reaves Relationship to Patient[de-identified] Family Member  Caregiver's Telephone Number[de-identified] [phone]  Support Systems: Daughter  [place] of Residence: [address] [place] [address] do you live in?: [address] entry access options. Select all that apply.: Stairs  Number of steps to enter home. : 1  Do the steps have railings?: No  Type of Current Residence: [place]  In the last 12 months, was there a time when you were not able to pay the mortgage or rent on time?: No  In the last 12 months, how many places have you lived?: 1  In the last 12 months, was there a time when you did not have a steady place to sleep or slept in a shelter (including now)?: No  Homeless/housing insecurity resource given?: N/A  Living Arrangements: Lives Alone (Lives alone but two daughters live very close by, visit frequently day and night.  One [employer]ina Kinds is able to stay there whenever needed.)    Activities of Daily Living Prior to Admission  Functional Status: Assistance  Completes ADLs independently?: No  Level of ADL dependence: Assistance  Ambulates independently?: No  Level of ambulatory dependence: Assistance  Does patient use assisted devices?: Yes  Assisted Devices (DME) used: Veleta Janelle  Does patient currently own DME?: Yes  What DME does the patient currently own?: Dillontamara Luis  Does patient have a history of Outpatient Therapy (PT/OT)?: No  Does the patient have a history of Short-Term Rehab?: No  Does patient have a history of HHC?: No  Does patient currently have [address]?: No         Patient Information Continued  Income Source: Pension/[employer]  Within the past 12 months, you worried that your food would run out before you got the money to buy more.: Never true  Within the past 12 months, the food you bought just didn't last and you didn't have money to get more.: Never true  Food insecurity resource given?: N/A  Does patient receive dialysis treatments?: No  Does patient have a history of substance abuse?: No  Does patient have a history of Mental Health Diagnosis?: No         Means of Transportation  Means of Transport to Appts[de-identified] Family transport  In the past 12 months, has lack of transportation kept you from medical appointments or from getting medications?: No  In the past 12 months, has lack of transportation kept you from meetings, work, or from getting things needed for daily living?: No  Was application for public transport provided?: N/A        DISCHARGE DETAILS:    Discharge planning discussed with[de-identified] Daughter Tamica Bach of Choice: Yes  Comments - Freedom of Choice: Daughter consented [facility] referrals in [address] [place] [address]. CM contacted family/caregiver?: Yes  Were Treatment Team discharge recommendations reviewed with patient/caregiver?: Yes  Did patient/caregiver verbalize understanding of patient care needs?: Yes  Were patient/caregiver advised of the risks associated with not following Treatment Team discharge recommendations?: Yes    Contacts  Patient Contacts: Daughter Allie Fat  Relationship to Patient[de-identified] Family  Contact Method: In Person  Reason/Outcome: Continuity of Care, Emergency Contact, Discharge [address]         Is the patient interested in [facility] at discharge?: Yes (Patient's daughter stated they would be interested in nursing services for patient's blood to be taken as needed instead of having to go to a lab. CM made daughter aware there is no recommendation for nursing, only PT/OT.)    DME Referral Provided  Referral made for DME?: No    Other Referral/Resources/Interventions Provided:  Referral Comments: [place] referrals for [facility] for PT/OT sent in [place].          Treatment Team Recommendation: Home with [address]  Discharge Destination Plan[de-identified] Home with [address] at Discharge : Family

## 2023-07-12 NOTE — ASSESSMENT & PLAN NOTE
Baseline creat 0.8. Creatinine 6.42 >5 0.82  · Ultrasound renal-No hydronephrosis. Tiny cyst upper pole of the right kidney. 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   · Patient not keen on HD or biopsy and daughter aware of this as well    Results from last 7 days   Lab Units 07/11/23  1042 07/10/23  0536 07/09/23  2155 07/09/23  1734 07/09/23  1608   BUN mg/dL 64* 78* 81* 82* 86*   CREATININE mg/dL 5.27* 5.82* 6.02* 5.91* 6.42*

## 2023-07-12 NOTE — PROGRESS NOTES
[address] [facility]  Progress Note  Name: Attila Morton  MRN: [id number]  Unit/Bed#: [address] [facility] [address] I Date of Admission: 7/9/2023   Date of Service: 7/11/2023 I Hospital Day: 2    Assessment/Plan   * Acute renal failure (ARF) (HCC)  Assessment & Plan  Baseline creat 0.8. Creatinine 6.42 >5 0.82  · Ultrasound renal-No hydronephrosis. Tiny cyst upper pole of the right kidney. 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 · Patient not keen on HD or biopsy and daughter aware of this as well    Results from last 7 days   Lab Units 07/11/23  1042 07/10/23  0536 07/09/23  2155 07/09/23  1734 07/09/23  1608   BUN mg/dL 64* 78* 81* 82* 86*   CREATININE mg/dL 5.27* 5.82* 6.02* 5.91* 6.42*         Compression fracture of T8 vertebra ([facility])  Assessment & Plan  Possibly due to osteoporosis  · Vitamin D-slight decreased, started on vitamin D/C  · Lidocaine patch, scheduled Tylenol and oxycodone prn  · D/W Neurosurgery again today who reviewed Xray and will continue with TLSO brace prn OOB  · F/U with neurosurgery prn    Hypothyroidism  Assessment & Plan  Chronic,   · continue home levothyroxine 25 mcg    Insomnia  Assessment & Plan  · Melatonin as needed ordered    Type 2 diabetes mellitus with hyperglycemia, without long-term current use of insulin [facility])  Assessment & Plan  Lab Results   Component Value Date    HGBA1C 7.1 (H) 07/09/2023       Recent Labs     07/11/23  0727 07/11/23  1114 07/11/23  1554 07/11/23 2011   POCGLU 101 108 125 118     · Continue SSI    Hyperkalemia-resolved as of 7/10/2023  Assessment & Plan  Potassium 6.3 > 4.8  · ED bicarb/calcium gluconate/albuterol/insulin/d50  · Nephrology consulted           VTE Pharmacologic Prophylaxis:   Heparin    Patient Centered Rounds: I performed bedside rounds with nursing staff today.   Discussions with Specialists or Other Care Team Provider: yes - neurosurgery, renal    Education and Discussions with Family / Patient: Updated  (daughter) at bedside. Total Time Spent on Date of Encounter in care of patient: 35 minutes This time was spent on one or more of the following: performing physical exam; counseling and coordination of care; obtaining or reviewing history; documenting in the medical record; reviewing/ordering tests, medications or procedures; communicating with other healthcare professionals and discussing with patient's family/caregivers. Current Length of Stay: 2 day(s)  Current Patient Status: Inpatient   Certification Statement: The patient will continue to require additional inpatient hospital stay due to JOSE requiring heme/onc evaluation, compression fractures  Discharge Plan: Anticipate discharge in 24-48 hrs to home with home services. Code Status: Level 1 - Full Code    Subjective:     Patient reports back pain and decreased po intake    Objective:     Vitals:   Temp (24hrs), Av.6 °F (36.4 °C), Min:97.4 °F (36.3 °C), Max:97.7 °F (36.5 °C)    Temp:  [97.4 °F (36.3 °C)-97.7 °F (36.5 °C)] 97.6 °F (36.4 °C)  HR:  [85-97] 97  Resp:  [17-18] 18  BP: (113-147)/(55-85) 147/85  SpO2:  [97 %-100 %] 97 %  Body mass index is 24.48 kg/m². Input and Output Summary (last 24 hours): Intake/Output Summary (Last 24 hours) at 2023  Last data filed at 2023 1824  Gross per 24 hour   Intake 270 ml   Output 750 ml   Net -480 ml       Physical Exam:   Physical Exam  Constitutional:       General: She is not in acute distress. Appearance: She is not toxic-appearing. HENT:      Head: Normocephalic and atraumatic. Eyes:      General: No scleral icterus. Cardiovascular:      Rate and Rhythm: Normal rate and regular rhythm. Pulmonary:      Effort: Pulmonary effort is normal. No respiratory distress. Breath sounds: Normal breath sounds. No wheezing or rales. Abdominal:      General: Bowel sounds are normal. There is no distension. Palpations: Abdomen is soft. Tenderness: There is no abdominal tenderness. Musculoskeletal:      Comments: No spinal tenderness elicited. Paraspinal muscle tenderness noted along the thoracic region on the right   Neurological:      Mental Status: She is alert. Additional Data:     Labs:  Results from last 7 days   Lab Units 07/10/23  0536 07/09/23  1734 07/09/23  1608   WBC Thousand/uL 4.79  --  6.99   HEMOGLOBIN g/dL 7.2*  --  8.8*   HEMATOCRIT % 22.1*  --  27.1*   PLATELETS Thousands/uL 137*   < > 189   NEUTROS PCT %  --   --  58   LYMPHS PCT %  --   --  28   MONOS PCT %  --   --  11   EOS PCT %  --   --  2    < > = values in this interval not displayed.      Results from last 7 days   Lab Units 07/11/23  1042   SODIUM mmol/L 139   POTASSIUM mmol/L 5.1   CHLORIDE mmol/L 109*   CO2 mmol/L 19*   BUN mg/dL 64*   CREATININE mg/dL 5.27*   ANION GAP mmol/L 11   CALCIUM mg/dL 9.0   ALBUMIN g/dL 3.6   TOTAL BILIRUBIN mg/dL 0.24   ALK PHOS U/L 45   ALT U/L 12   AST U/L 13   GLUCOSE RANDOM mg/dL 95         Results from last 7 days   Lab Units 07/11/23  1554 07/11/23  1114 07/11/23  0727 07/10/23  2032 07/10/23  1608 07/10/23  1117 07/10/23  0726 07/09/23  1802   POC GLUCOSE mg/dl 125 108 101 181* 172* 206* 100 173*     Results from last 7 days   Lab Units 07/09/23  1734   HEMOGLOBIN A1C % 7.1*           Lines/Drains:  Invasive Devices     Peripheral Intravenous Line  Duration           Peripheral IV 07/09/23 Right Antecubital 2 days                      Imaging: Reviewed radiology reports from this admission including: xray(s)    Recent Cultures (last 7 days):         Last 24 Hours Medication List:   Current Facility-Administered Medications   Medication Dose Route Frequency Provider Last Rate   • acetaminophen  650 mg Oral Q6H PRN Steve Akhtar MD     • acetaminophen  975 mg Oral Q8H [facility] CLARITA Diaz     • calcium carbonate-vitamin D  1 tablet Oral BID With Meals Steve Akhtar MD     • docusate sodium  100 mg Oral BID CLARITA Diaz     • gabapentin  100 mg Oral HS CLARITA Diaz     • heparin (porcine)  5,000 Units Subcutaneous [facility] Yesenia Blakely MD     • insulin lispro  1-5 Units Subcutaneous TID AC Yesenia Blakely MD     • levothyroxine  25 mcg Oral QAM Yesenia Blakely MD     • lidocaine  1 patch Topical Daily CLARITA Diaz     • ondansetron  4 mg Intravenous Q6H PRN Yesenia Blakely MD     • oxyCODONE  2.5 mg Oral Q6H PRN Mariah Alfaro DO     • sodium bicarbonate  650 mg Oral BID after meals Dejon Najera MD          Today, Patient Was Seen By: Kiersten Osuna DO    **Please Note: This note may have been constructed using a voice recognition system. **

## 2023-07-12 NOTE — ASSESSMENT & PLAN NOTE
Possibly due to osteoporosis  · Vitamin D-slight decreased, started on vitamin D/C  · Lidocaine patch, scheduled Tylenol and oxycodone prn.  Pain improved with addition of robaxin  · D/W Neurosurgery on 7/11 who reviewed Xray and will continue with TLSO brace prn OOB  · F/U with neurosurgery prn

## 2023-07-12 NOTE — ASSESSMENT & PLAN NOTE
Lab Results   Component Value Date    HGBA1C 7.1 (H) 07/09/2023       Recent Labs     07/11/23 2011 07/12/23  0718 07/12/23  1109 07/12/23  1631   POCGLU 118 86 103 145*     · Continue SSI.

## 2023-07-12 NOTE — OCCUPATIONAL THERAPY NOTE
Occupational Therapy Evaluation/Treatment       07/12/23 1410   Note Type   Note type Evaluation   Pain Assessment   Pain Assessment Tool 0-10   Pain Score 8   Pain Location/Orientation Location: Back   Restrictions/Precautions   Braces or Orthoses TLSO   Other Precautions Chair Alarm; Bed Alarm; Fall Risk;Spinal precautions   Home Living   Type of [address] Two level; Able to live on main level with bedroom/bathroom  ([address])   Home Equipment Cane;Walker   Additional Comments Patient using roller walker at times at home otherwise holding onto furniture as per family prior to admission   Prior Function   Level of [place] Independent with ADLs; Independent with functional mobility; Needs assistance with IADLS   Lives With Alone   Receives Help From Family   IADLs Family/Friend/Other provides transportation; Family/Friend/Other provides meals; Family/Friend/Other provides medication management   Comments pts daughter reports patient has been staying with her for the last few weeks, daughter reports assisting patient with dressing   General   Family/Caregiver Present Yes  (daughter)   Additional General Comments pt given pain meds by nurse prior to session   ADL   Eating Assistance 5  Supervision/Setup   Grooming Assistance 5  Supervision/Setup   UB Bathing Assistance 4  Minimal Assistance    [address] 3  Moderate Assistance   UB [address] 4  Minimal Assistance   LB [address] Final 3  Moderate [address]  3  Moderate Assistance   Transfers   Sit to Stand 3  Moderate assistance   Additional items Assist x 1;Verbal cues   Stand to Sit 3  Moderate assistance   Additional items Assist x 1;Verbal cues   Additional Comments max assist to jose miguel TLSO brace   Functional Mobility   Functional Mobility 3  Moderate assistance   Additional Comments to bathroom with RW and TLSO brace   Balance   Static Sitting Fair -   Dynamic Sitting Fair -   Static Standing Poor + Dynamic Standing Poor +   Activity Tolerance   Activity Tolerance Patient limited by fatigue;Patient limited by pain   Nurse Made Aware yes, Verenice   RUE Assessment   RUE Assessment WFL   LUE Assessment   LUE Assessment WFL   Cognition   Overall Cognitive Status WFL   Arousal/Participation Cooperative   Attention Within functional limits   Orientation Level Oriented X4   Following Commands Follows one step commands with increased time or repetition   Assessment   Limitation Decreased ADL status; Decreased UE strength;Decreased Safe judgement during ADL;Decreased endurance;Decreased high-level ADLs; Decreased self-care trans  (decreased balance and mobility)   Prognosis Good   Assessment Patient evaluated by Occupational Therapy. Patient admitted with Acute renal failure (ARF) ([address]). The patients occupational profile, medical and therapy history includes a extensive additional review of physical, cognitive, or psychosocial history related to current functional performance. Comorbidities affecting functional mobility and ADLS include: diabetes and compression fracture. Prior to admission, patient was independent with functional mobility with walker, requiring assist for ADLS and requiring assist for IADLS. The evaluation identifies the following performance deficits: weakness, impaired balance, decreased endurance, increased fall risk, new onset of impairment of functional mobility, decreased ADLS, decreased IADLS, pain, decreased activity tolerance, decreased safety awareness, impaired judgement and decreased strength, that result in activity limitations and/or participation restrictions. This evaluation requires clinical decision making of high complexity, because the patient presents with comorbidites that affect occupational performance and required significant modification of tasks or assistance with consideration of multiple treatment options.   The Barthel Index was used as a functional outcome tool presenting with a score of Barthel Index Score: 35, indicating marked limitations of functional mobility and ADLS. The patient's raw score on the -PAC Daily Activity Inpatient Short Form is 16. A raw score of less than 19 suggests the patient may benefit from discharge to post-acute rehabilitation services. Please refer to the recommendation of the Occupational Therapist for safe discharge planning. Patient will benefit from skilled Occupational Therapy services to address above deficits and facilitate a safe return to prior level of function. Goals   Patient Goals to go home   STG Time Frame   (1-7 days)   Short Term Goal  Goals established to promote Patient Goals: to go home:  Eating: independent; Grooming: independent seated; Bathing: min assist; Upper Body Dressing supervision; Lower Body Dressing: min assist; Toileting: min assist; Patient will increase ambulatory standard toilet transfer to min assist with rolling walker to increase performance and safety with ADLS and functional mobility; Patient will increase standing tolerance to 3 minutes during ADL task to decrease assistance level and decrease fall risk; Patient will increase bed mobility to supervision in preparation for ADLS and transfers; Patient will increase functional mobility to and from bathroom with rolling walker with min assist to increase performance with ADLS and to use a toilet; Patient will tolerate 5 minutes of UE ROM/strengthening to increase general activity tolerance and performance in ADLS/IADLS; Patient will improve functional activity tolerance to 10 minutes of sustained functional tasks to increase participation in basic self-care and decrease assistance level; Patient will increase dynamic standing balance to fair- to improve postural stability and decrease fall risk during standing ADLS and transfers. LTG Time Frame   (8-14 days)   Long Term Goal Bathing: supervision; Upper Body Dressing independent;  Lower Body Dressing: supervision; Toileting: supervision; Patient will increase ambulatory standard toilet transfer to supervision with rolling walker to increase performance and safety with ADLS and functional mobility; Patient will increase standing tolerance to 6 minutes during ADL task to decrease assistance level and decrease fall risk; Patient will increase bed mobility to independent in preparation for ADLS and transfers; Patient will increase functional mobility to and from bathroom with rolling walker with supervision to increase performance with ADLS and to use a toilet; Patient will tolerate 10 minutes of UE ROM/strengthening to increase general activity tolerance and performance in ADLS/IADLS; Patient will improve functional activity tolerance to 20 minutes of sustained functional tasks to increase participation in basic self-care and decrease assistance level; Patient will increase dynamic standing balance to fair to improve postural stability and decrease fall risk during standing ADLS and transfers. Pt will score >/= 20/24 on AM-PAC Daily Activity Inpatient scale to promote safe independence with ADLs and functional mobility; Pt will score >/= 65/100 on Barthel Index in order to decrease caregiver assistance needed and increase ability to perform ADLs and functional mobility. Plan   Treatment Interventions ADL retraining;Functional transfer training;UE strengthening/ROM; Endurance training;Patient/family training;Equipment evaluation/education; Activityengagement; Compensatory technique education   Goal Expiration Date 07/26/23   OT Frequency 3-5x/wk   Recommendation   OT Discharge Recommendation Post acute rehabilitation services   Additional Comments  patients daughter plans to take patient home with 24 hour care   [facility]   Lower Body Dressing 2   Bathing 2   Toileting 2   Upper Body Dressing 3   Grooming 3   Eating 4   Daily Activity Raw Score 16   Daily Activity Standardized Score (Calc for Raw Score >=11) 35.96   AM-PAC Applied Cognition Inpatient   Following a Speech/Presentation 3   Understanding Ordinary Conversation 4   Taking Medications 3   Remembering Where Things Are Placed or Put Away 3   Remembering List of 4-5 Errands 3   Taking Care of Complicated Tasks 2   Applied Cognition Raw Score 18   Applied Cognition Standardized Score 38.07   Barthel Index   Feeding 10   Bathing 0   Grooming Score 0   Dressing Score 5   Bladder Score 5   Bowels Score 5   Toilet Use Score 5   Transfers (Bed/Chair) Score 5   Mobility (Level Surface) Score 0   Stairs Score 0   Barthel Index Score 35   Additional Treatment Session   Start Time 1400   End Time 1410   Treatment Assessment s: 8/10 back pain O: Completed standard toilet transfer with mod assist x 2, hygiene for BM max assist in stance, hygiene for urination supervision seated. Functional mobility from bathroom with RW with mod/min assist.  Stand to sit min assist A: Patient limited by pain despite pain medicine. Patient requires mod/max assist for LB ADLS. Patient is generally weak and deconditioned.  P: [id number]  [facility] [id number] [facility] OTR/L [id number]

## 2023-07-12 NOTE — ASSESSMENT & PLAN NOTE
Lab Results   Component Value Date    HGBA1C 7.1 (H) 07/09/2023       Recent Labs     07/11/23  0727 07/11/23  1114 07/11/23  1554 07/11/23 2011   POCGLU 101 108 125 118     · Continue SSI

## 2023-07-12 NOTE — ASSESSMENT & PLAN NOTE
Baseline creat 0.8. Creatinine 6.42 >5 0.82  · Ultrasound renal-No hydronephrosis. Tiny cyst upper pole of the right kidney. · C3 low, TAMIKO, ANCA neg. 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 · Patient not keen on HD or biopsy and daughter aware of this as well  · Immunofixation with free lambda chain. Spoke with heme-onc and will need outpatient follow-up for further evaluation if patient agreeable.   This was discussed with patient's daughters as well    Results from last 7 days   Lab Units 07/12/23  0536 07/11/23  1042 07/10/23  0536 07/09/23  2155 07/09/23  1734 07/09/23  1608   BUN mg/dL 65* 64* 78* 81* 82* 86*   CREATININE mg/dL 5.11* 5.27* 5.82* 6.02* 5.91* 6.42*

## 2023-07-12 NOTE — PLAN OF CARE
Problem: MOBILITY - ADULT  Goal: Maintain or return to baseline ADL function  Description: INTERVENTIONS:  -  Assess patient's ability to carry out ADLs; assess patient's baseline for ADL function and identify physical deficits which impact ability to perform ADLs (bathing, care of mouth/teeth, toileting, grooming, dressing, etc.)  - Assess/evaluate cause of self-care deficits   - Assess range of motion  - Assess patient's mobility; develop plan if impaired  - Assess patient's need for assistive devices and provide as appropriate  - Encourage maximum independence but intervene and supervise when necessary  - Involve family in performance of ADLs  - Assess for home care needs following discharge   - Consider OT consult to assist with ADL evaluation and planning for discharge  - Provide patient education as appropriate  Outcome: Progressing  Goal: Maintains/Returns to pre admission functional level  Description: INTERVENTIONS:  - Perform BMAT or MOVE assessment daily.   - Set and communicate daily mobility goal to care team and patient/family/caregiver.    - Collaborate with rehabilitation services on mobility goals  - Out of bed for toileting  - Record patient progress and toleration of activity level   Outcome: Progressing     Problem: Prexisting or High Potential for Compromised Skin Integrity  Goal: Skin integrity is maintained or improved  Description: INTERVENTIONS:  - Identify patients at risk for skin breakdown  - Assess and monitor skin integrity  - Assess and monitor nutrition and hydration status    - Turn and reposition patient  - Assist with mobility/ambulation    Outcome: Progressing     Problem: PAIN - ADULT  Goal: Verbalizes/displays adequate comfort level or baseline comfort level  Description: Interventions:  - Encourage patient to monitor pain and request assistance  - Assess pain using appropriate pain scale  - Administer analgesics based on type and severity of pain and evaluate response  - Implement non-pharmacological measures as appropriate and evaluate response  - Consider cultural and social influences on pain and pain management  - Notify physician/advanced practitioner if interventions unsuccessful or patient reports new pain  Outcome: Progressing     Problem: METABOLIC, FLUID AND ELECTROLYTES - ADULT  Goal: Electrolytes maintained within normal limits  Description: INTERVENTIONS:  - Monitor labs and assess patient for signs and symptoms of electrolyte imbalances  - Administer electrolyte replacement as ordered  - Monitor response to electrolyte replacements, including repeat lab results as appropriate  - Instruct patient on fluid and nutrition as appropriate  Outcome: Progressing     Problem: Nutrition/Hydration-ADULT  Goal: Nutrient/Hydration intake appropriate for improving, restoring or maintaining nutritional needs  Description: Monitor and assess patient's nutrition/hydration status for malnutrition. Collaborate with interdisciplinary team and initiate plan and interventions as ordered. Monitor patient's weight and dietary intake as ordered or per policy. Utilize nutrition screening tool and intervene as necessary. Determine patient's food preferences and provide high-protein, high-caloric foods as appropriate.      INTERVENTIONS:  - Monitor oral intake, urinary output, labs, and treatment plans  - Assess nutrition and hydration status and recommend course of action  - Evaluate amount of meals eaten  - Assist patient with eating if necessary   - Allow adequate time for meals  - Recommend/ encourage appropriate diets, oral nutritional supplements, and vitamin/mineral supplements  - Order, calculate, and assess calorie counts as needed  - Assess need for intravenous fluids  - Provide specific nutrition/hydration education as appropriate  - Include patient/family/caregiver in decisions related to nutrition  Outcome: Progressing

## 2023-07-12 NOTE — PROGRESS NOTES
[id number] [facility]  Progress Note  Name: Cary Espinal  MRN: [id number]  Unit/Bed#: 913 [facility] [address] I Date of Admission: 7/9/2023   Date of Service: 7/12/2023 I Hospital Day: 3    Assessment/Plan   * Acute renal failure (ARF) ([address])  Assessment & Plan  Baseline creat 0.8. Creatinine 6.42 >5 0.82  · Ultrasound renal-No hydronephrosis. Tiny cyst upper pole of the right kidney. · C3 low, TAMIKO, ANCA neg. 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 · Patient not keen on HD or biopsy and daughter aware of this as well  · Immunofixation with free lambda chain. Spoke with heme-onc and will need outpatient follow-up for further evaluation if patient agreeable. This was discussed with patient's daughters as well    Results from last 7 days   Lab Units 07/12/23  0536 07/11/23  1042 07/10/23  0536 07/09/23  2155 07/09/23  1734 07/09/23  1608   BUN mg/dL 65* 64* 78* 81* 82* 86*   CREATININE mg/dL 5.11* 5.27* 5.82* 6.02* 5.91* 6.42*         Compression fracture of T8 vertebra (HCC)  Assessment & Plan  Possibly due to osteoporosis  · Vitamin D-slight decreased, started on vitamin D/C  · Lidocaine patch, scheduled Tylenol and oxycodone prn. Pain improved with addition of robaxin  · D/W Neurosurgery on 7/11 who reviewed Xray and will continue with TLSO brace prn OOB  · F/U with neurosurgery prn    Hypothyroidism  Assessment & Plan  Chronic,   · continue home levothyroxine 25 mcg. Insomnia  Assessment & Plan  · Melatonin as needed ordered. Type 2 diabetes mellitus with hyperglycemia, without long-term current use of insulin [facility])  Assessment & Plan  Lab Results   Component Value Date    HGBA1C 7.1 (H) 07/09/2023       Recent Labs     07/11/23 2011 07/12/23  0718 07/12/23  1109 07/12/23  1631   POCGLU 118 86 103 145*     · Continue SSI.     Hyperkalemia-resolved as of 7/10/2023  Assessment & Plan  Potassium 6.3 > 4.8  · ED bicarb/calcium gluconate/albuterol/insulin/d50  · Nephrology consulted             VTE Pharmacologic Prophylaxis:   heparin    Patient Centered Rounds: I performed bedside rounds with nursing staff today. Discussions with Specialists or Other Care Team Provider: yes     Education and Discussions with Family / Patient: Updated  (daughter) at bedside. Total Time Spent on Date of Encounter in care of patient: 40 min This time was spent on one or more of the following: performing physical exam; counseling and coordination of care; obtaining or reviewing history; documenting in the medical record; reviewing/ordering tests, medications or procedures; communicating with other healthcare professionals and discussing with patient's family/caregivers. Current Length of Stay: 3 day(s)  Current Patient Status: Inpatient   Certification Statement: The patient will continue to require additional inpatient hospital stay due to Tractable back pain requiring adjustment of medications and monitoring overnight. JOSE  Discharge Plan: Anticipate discharge tomorrow to home with home services. Code Status: Level 1 - Full Code    Subjective:     Patient continues to report back pain although it did not improve after receiving muscle relaxer. Discussed with patient and daughters that she is utilizing on average 1 dose of oxycodone per day so rather than increase the dose of the medication, she can utilize more than just 1 dose a day for pain management    Objective:     Vitals:   Temp (24hrs), Av.6 °F (36.4 °C), Min:97.4 °F (36.3 °C), Max:97.8 °F (36.6 °C)    Temp:  [97.4 °F (36.3 °C)-97.8 °F (36.6 °C)] 97.8 °F (36.6 °C)  HR:  [89-97] 91  Resp:  [17-18] 17  BP: ()/(69-85) 100/69  SpO2:  [91 %-97 %] 91 %  Body mass index is 24.48 kg/m². Input and Output Summary (last 24 hours):      Intake/Output Summary (Last 24 hours) at 2023 1706  Last data filed at 2023 0823  Gross per 24 hour   Intake 50 ml   Output 850 ml   Net -800 ml       Physical Exam:   Physical Exam  Constitutional:       General: She is not in acute distress. HENT:      Head: Normocephalic and atraumatic. Eyes:      General: No scleral icterus. Cardiovascular:      Rate and Rhythm: Normal rate and regular rhythm. Pulmonary:      Effort: Pulmonary effort is normal. No respiratory distress. Breath sounds: Normal breath sounds. No wheezing or rales. Abdominal:      General: Bowel sounds are normal. There is no distension. Palpations: Abdomen is soft. Tenderness: There is no abdominal tenderness. Musculoskeletal:      Right lower leg: No edema. Left lower leg: No edema. Neurological:      Mental Status: She is alert. Additional Data:     Labs:  Results from last 7 days   Lab Units 07/12/23  0536 07/09/23  1734 07/09/23  1608   WBC Thousand/uL 5.59   < > 6.99   HEMOGLOBIN g/dL 7.4*   < > 8.8*   HEMATOCRIT % 21.9*   < > 27.1*   PLATELETS Thousands/uL 146*   < > 189   NEUTROS PCT %  --   --  58   LYMPHS PCT %  --   --  28   MONOS PCT %  --   --  11   EOS PCT %  --   --  2    < > = values in this interval not displayed.      Results from last 7 days   Lab Units 07/12/23  0536   SODIUM mmol/L 138   POTASSIUM mmol/L 4.9   CHLORIDE mmol/L 107   CO2 mmol/L 18*   BUN mg/dL 65*   CREATININE mg/dL 5.11*   ANION GAP mmol/L 13   CALCIUM mg/dL 9.2   ALBUMIN g/dL 3.5   TOTAL BILIRUBIN mg/dL 0.31   ALK PHOS U/L 40   ALT U/L 13   AST U/L 14   GLUCOSE RANDOM mg/dL 83         Results from last 7 days   Lab Units 07/12/23  1631 07/12/23  1109 07/12/23  0718 07/11/23 2011 07/11/23  1554 07/11/23  1114 07/11/23  0727 07/10/23  2032 07/10/23  1608 07/10/23  1117 07/10/23  0726 07/09/23  1802   POC GLUCOSE mg/dl 145* 103 86 118 125 108 101 181* 172* 206* 100 173*     Results from last 7 days   Lab Units 07/09/23  1734   HEMOGLOBIN A1C % 7.1*           Lines/Drains:  Invasive Devices     Peripheral Intravenous Line  Duration Peripheral IV 07/09/23 Right Antecubital 3 days                      Imaging: No pertinent imaging reviewed. Recent Cultures (last 7 days):         Last 24 Hours Medication List:   Current Facility-Administered Medications   Medication Dose Route Frequency Provider Last Rate   • acetaminophen  975 mg Oral Q8H [address] CLARITA Diaz     • calcium carbonate-vitamin D  1 tablet Oral BID With Meals Edna Quevedo MD     • docusate sodium  100 mg Oral BID CLARITA Diaz     • gabapentin  100 mg Oral HS CLARITA Diaz     • heparin (porcine)  5,000 Units Subcutaneous Q8H MD Alejandra     • insulin lispro  1-5 Units Subcutaneous TID AC Edna Quevedo MD     • levothyroxine  25 mcg Oral QAM Edna Quevedo MD     • lidocaine  1 patch Topical Daily CLARITA Diaz     • melatonin  3 mg Oral HS PRN Mariah Mcar, DO     • methocarbamol  500 mg Oral Q12H PRN Mariah Alfaro, DO     • ondansetron  4 mg Intravenous Q6H PRN Edna Quevedo MD     • oxyCODONE  2.5 mg Oral Q6H PRN Mariah Alfaro, DO     • sodium bicarbonate  650 mg Oral TID after meals Erin Holguin MD     • torsemide  10 mg Oral Daily Erin Holguin MD          Today, Patient Was Seen By: Francisco Jacobson DO    **Please Note: This note may have been constructed using a voice recognition system. **

## 2023-07-12 NOTE — CASE MANAGEMENT
Case Management Discharge Planning Note    Patient name Anca Moura  Location [address]-* MRN [id number]  : 11/3/1931 Date 2023       Current Admission Date: 2023  Current Admission Diagnosis:Acute renal failure (ARF) [facility])   Patient Active Problem List    Diagnosis Date Noted   • Acute renal failure (ARF) ([address]) 2023   • Compression fracture of T8 vertebra ([address]) 2023   • Acute kidney injury superimposed on CKD ([address]) 2023   • Chronic renal disease, stage IV ([address]) 2023   • Other fatigue 01/15/2022   • Leg edema 01/15/2022   • Hypothyroidism due to medication 2021   • Benign essential microscopic hematuria 2021   • BMI 31.0-31.9,adult 2021   • Uncontrolled type 2 diabetes mellitus with hyperglycemia ([address]) 2021   • Abnormal urinalysis 2020   • Leg swelling 2020   • Need for vaccination with 13-polyvalent pneumococcal conjugate vaccine 2020   • Primary osteoarthritis of left ankle 2020   • Bilateral primary osteoarthritis of knee 2020   • Hypothyroidism 2020   • BMI 27.0-27.9,adult 2020   • Type 2 diabetes mellitus with hyperglycemia, without long-term current use of insulin ([address]) 2016   • Insomnia 2016   • Hyperlipidemia 2015   • Abnormal blood sugar 2015   • Pain in joint 07/10/2015      LOS (days): 3  Geometric Mean LOS (GMLOS) (days): 5.10  Days to GMLOS:2.1     OBJECTIVE:  Risk of Unplanned Readmission Score: 16.36         Current admission status: Inpatient   Preferred Pharmacy:   [facility] [address] [facility] A[facility] [address] [place] [address] [phone]  Phone: [phone] Fax: [phone]    Primary Care Provider: Maribeth Tavera MD    Primary Insurance: MEDICARE  Secondary Insurance: BLUE CROSS    DISCHARGE DETAILS:    Discharge planning discussed with[de-identified] Daughter Arn Hew of Choice: Yes  Comments - Freedom of Choice: Daughter consented for CM to send blanket [address] referrals in [place]. CM contacted family/caregiver?: Yes  Were Treatment Team discharge recommendations reviewed with patient/caregiver?: Yes  Did patient/caregiver verbalize understanding of patient care needs?: Yes  Were patient/caregiver advised of the risks associated with not following Treatment Team discharge recommendations?: Yes    Contacts  Patient Contacts: Daughter Anat Rater  Relationship to Patient[de-identified] Family  Contact Method: In Person  Reason/Outcome: Continuity of Care, Emergency Contact, Discharge [address] [facility]         Is the patient interested in [address] at discharge?: Yes (Patient's daughter stated they would be interested in nursing services for patient's blood to be taken as needed instead of having to go to a lab. CM made daughter aware there is no recommendation for nursing, only PT/OT.)    DME Referral Provided  Referral made for DME?: No    Other Referral/Resources/Interventions Provided:  Referral Comments: [place] referrals for [address] for PT/OT sent in [place]. Treatment Team Recommendation: Home with [address]  Discharge Destination Plan[de-identified] Home with [address] [facility] at Discharge : Family                             IMM Given (Date):: 07/12/23 (IMM presented and reviewed with patient and patient's daughter bedside. Daughter verbalized understanding, signed, and copy was provided.  Other copy placed in scan bin.)  IMM Given to[de-identified] Family

## 2023-07-12 NOTE — PROGRESS NOTES
NEPHROLOGY PROGRESS NOTE   Julia Herrera 80 y.o. female MRN: [id number]  Unit/Bed#: [address] [place] [address] Encounter: [id number]  Reason for Consult: JOSE    ASSESSMENT AND PLAN:  81 yo woman with PMH of UTI, urinary incontinence,  hypothyroidism, diabetes p/w back pain.  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 Leon Johnson and myself, with patient and daughter) or kidney biopsy   • Patient can be discharged with [facility] in 1 week and follow-up appointment with nephrology ( I will send a message to [facility] office)   • Adjust medications to GFR  • GN work-up:  ? C3 low ( DDD, MGRS less likely postinfectious)  ? ANCA, anti-GBM, TAMIKO, SPEP, kappa lambda ratio pending  ?  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  • As per my conversation with patient she might not agree to [facility] biopsy  • Patient will follow-up as an outpatient     #Acid-base Disorder  • serum HCO3 18 mmol/L  • A  • Non-anion gap metabolic acidosis  • Secondary to JOSE  • Increase sodium bicarb to 650 mg 3 times daily      #Volume status/hypertension:  • Volume: Euvolemic but noticed eyelid swelling  • Blood pressure: Tendency to hypotension /69 mmhg ,  Goal<140/90  • Recommend:  • Torsemide 10 mg daily      #Anemia:  • Current hemoglobin: 7.4 mg/dL  • Treatment:  • Transfuse for hemoglobin less than 7.0 per primary service       #Hypocalcemia  • Ionized calcium 1  • At goal     #Borderline hyperphosphatemia   • Phosphorus 5.4 mg/dL, goal <5.5   • Low phosphorus diet at home  • No indication of binders    Discussed with Dr. Justice Mann. Kidney function stable. We agree to discharge and follow-up as an outpatient with nephrology       SUBJECTIVE:  Patient seen and examined at bedside. No chest pain, shortness of breath, nausea, vomiting, abdominal pain or diarrhea.       OBJECTIVE:  Current Weight: Weight - Scale: 55 kg (121 lb 3.2 oz)  Vitals:    07/12/23 0820   BP: 100/69   Pulse: 91   Resp:    Temp:    SpO2: 91%       Intake/Output Summary (Last 24 hours) at 7/12/2023 1002  Last data filed at 7/12/2023 0484  Gross per 24 hour   Intake 150 ml   Output 850 ml   Net -700 ml     Wt Readings from Last 3 Encounters:   07/09/23 55 kg (121 lb 3.2 oz)   04/25/23 61 kg (134 lb 6.4 oz)   10/11/22 62.5 kg (137 lb 12.8 oz)     Temp Readings from Last 3 Encounters:   07/12/23 97.8 °F (36.6 °C) (Oral)   04/25/23 97.9 °F (36.6 °C)   10/11/22 98.3 °F (36.8 °C)     BP Readings from Last 3 Encounters:   07/12/23 100/69   04/25/23 138/68   10/11/22 136/68     Pulse Readings from Last 3 Encounters:   07/12/23 91   04/25/23 64   10/11/22 70        General:  no acute distress at this time  Skin:  No acute rash  Eyes:  No scleral icterus and noninjected  ENT:  mucous membranes moist  Neck:  no carotid bruits  Chest:  Clear to auscultation percussion, good respiratory effort, no use of accessory respiratory muscles  CVS:  Regular rate and rhythm without rub   Abdomen:  soft and nontender   Extremities: no significant lower extremity edema  Neuro:  No gross focality  Psych:  Alert , cooperative     Medications:    Current Facility-Administered Medications:   •  acetaminophen (TYLENOL) tablet 650 mg, 650 mg, Oral, Q6H PRN, Rula Biggs MD, 650 mg at 07/09/23 1925  •  acetaminophen (TYLENOL) tablet 975 mg, 975 mg, Oral, Q8H [facility], CLARITA Diaz, 403 mg at 07/12/23 0500  •  calcium carbonate-vitamin D 500 mg-5 mcg tablet 1 tablet, 1 tablet, Oral, BID With Meals, Harrison Cali MD, 1 tablet at 07/12/23 0743  •  docusate sodium (COLACE) capsule 100 mg, 100 mg, Oral, BID, CLARITA Diaz, 100 mg at 07/12/23 5798  •  gabapentin (NEURONTIN) capsule 100 mg, 100 mg, Oral, HS, CLARITA Diaz, 100 mg at 07/11/23 2112  •  heparin (porcine) subcutaneous injection 5,000 Units, 5,000 Units, Subcutaneous, Q8H [address], 5,000 Units at 07/12/23 0501 **AND** [COMPLETED] Platelet count, , , Once, Harrison Clai MD  •  insulin lispro (HumaLOG) 100 units/mL subcutaneous injection 1-5 Units, 1-5 Units, Subcutaneous, TID AC, 1 Units at 07/10/23 1700 **AND** Fingerstick Glucose (POCT), , , TID AC, Harrison Cali MD  •  levothyroxine tablet 25 mcg, 25 mcg, Oral, QAM, Harrison Cali MD, 25 mcg at 07/12/23 0817  •  lidocaine (LIDODERM) 5 % patch 1 patch, 1 patch, Topical, Daily, CLARITA Diaz, 1 patch at 07/12/23 0817  •  melatonin tablet 3 mg, 3 mg, Oral, HS PRN, Mariah Alfaro DO  •  ondansetron (ZOFRAN) injection 4 mg, 4 mg, Intravenous, Q6H PRN, Harrison Cali MD  •  oxyCODONE (ROXICODONE) split tablet 2.5 mg, 2.5 mg, Oral, Q6H PRN, Mariah Alfaro DO, 2.5 mg at 07/12/23 0743  •  sodium bicarbonate tablet 650 mg, 650 mg, Oral, TID after meals, Cony Suárez MD, 650 mg at 07/12/23 0743  •  torsemide (DEMADEX) tablet 10 mg, 10 mg, Oral, Daily, Cony Suárez MD    Laboratory Results:  Results from last 7 days   Lab Units 07/12/23  0536 07/11/23  1042 07/10/23  0536 07/09/23  2155 07/09/23  1734 07/09/23  1608   WBC Thousand/uL 5.59  --  4.79  --   --  6.99   HEMOGLOBIN g/dL 7.4*  --  7.2*  --   --  8.8*   HEMATOCRIT % 21.9*  --  22.1*  --   --  27.1*   PLATELETS Thousands/uL 146*  --  137*  --  164 189   SODIUM mmol/L 138 139 141 140 140 138   POTASSIUM mmol/L 4.9 5.1 4.8 5.0 5.2 6.3*   CHLORIDE mmol/L 107 109* 112* 111* 112* 109*   CO2 mmol/L 18* 19* 16* 15* 13* 13*   BUN mg/dL 65* 64* 78* 81* 82* 86*   CREATININE mg/dL 5.11* 5.27* 5.82* 6.02* 5.91* 6.42*   CALCIUM mg/dL 9.2 9.0 7.5* 7.7* 7.7* 8.5   MAGNESIUM mg/dL  --   --  2.5  --   --   --    PHOSPHORUS mg/dL  --   --  5.4*  --   --   --        CT renal stone study abdomen pelvis without contrast   Final Result by Sarah Huang MD (07/09 1851)      1. No evidence of hydronephrosis or urinary tract calculus. 2. No acute inflammatory changes in the abdomen or pelvis   3. Compression fracture of the T8, and partial visualization of probable T7 compression fracture. Age indeterminant, but not present in 2013               Workstation performed: [id number]         XR spine thoracic 3 vw    (Results Pending)   XR spine thoracic 2 vw    (Results Pending)       Portions of the record may have been created with voice recognition software. Occasional wrong word or "sound a like" substitutions may have occurred due to the inherent limitations of voice recognition software. Read the chart carefully and recognize, using context, where substitutions have occurred.

## 2023-07-12 NOTE — TELEPHONE ENCOUNTER
Pt currently inpatient at [facility]--we can address after d/c during TCM visit as may no longer be needed--thanks

## 2023-07-13 ENCOUNTER — TELEPHONE (OUTPATIENT)
Dept: OTHER | Facility: OTHER | Age: 88
End: 2023-07-13

## 2023-07-13 NOTE — ASSESSMENT & PLAN NOTE
Possibly due to osteoporosis  · Vitamin D-slight decreased, continue with vitamin D/C supplementation  · Lidocaine patch, scheduled Tylenol and oxycodone,robaxin prn  · Also started on gabapentin here  · D/W daughters and patient that they need to be cautious with use of pain meds on her  · D/W Neurosurgery on 7/11 who reviewed Xray and will continue with TLSO brace prn OOB  · F/U with neurosurgery after d/c but per neurosurgery doesn't have to if not interested

## 2023-07-13 NOTE — DISCHARGE SUMMARY
[address] [facility]  Discharge- Pb Torres 11/3/1931, 80 y.o. female MRN: [id number]  Unit/Bed#: [address] [facility] [address] Encounter: [id number]  Primary Care Provider: Issac Ramos MD   Date and time admitted to hospital: 7/9/2023  3:15 PM    * Acute renal failure (ARF) ([address])  Assessment & Plan  Baseline creat 0.8. Creatinine 6.42 > improving  · Ultrasound renal-No hydronephrosis. Tiny cyst upper pole of the right kidney. · C3 low, TAMIKO, ANCA neg. 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 · Patient has declined hemodialysis and kidney biopsy, daughters are aware  · Immunofixation with free lambda chain. outpatient follow-up with heme/onc for further evaluation if patient agreeable. This was discussed with patient's daughters as well. · Continue sodium bicarb 650 mg 3 times daily for acid-base disorder  · Rpt labs as outpatient and f/u with PCP    Results from last 7 days   Lab Units 07/13/23  0537 07/12/23  0536 07/11/23  1042 07/10/23  0536 07/09/23  2155 07/09/23  1734 07/09/23  1608   BUN mg/dL 67* 65* 64* 78* 81* 82* 86*   CREATININE mg/dL 4.98* 5.11* 5.27* 5.82* 6.02* 5.91* 6.42*         Anemia  Assessment & Plan  Hb low but acceptable, appears macrocytic  Iron panel reviewed.  Folate low with low normal Vitamin D51  Started on folic acid with vitamin B12 supplementation on d/c    Results from last 7 days   Lab Units 07/13/23  1123 07/12/23  0536 07/10/23  0536 07/09/23  1608   HEMOGLOBIN g/dL 8.1* 7.4* 7.2* 8.8*   HEMATOCRIT % 24.0* 21.9* 22.1* 27.1*   MCV fL 101* 100* 102* 101*       Compression fracture of T8 vertebra (HCC)  Assessment & Plan  Possibly due to osteoporosis  · Vitamin D-slight decreased, continue with vitamin D/C supplementation  · Lidocaine patch, scheduled Tylenol and oxycodone,robaxin prn  · Also started on gabapentin here  · D/W daughters and patient that they need to be cautious with use of pain meds on her  · D/W Neurosurgery on 7/11 who reviewed Xray and will continue with TLSO brace prn OOB  · F/U with neurosurgery after d/c but per neurosurgery doesn't have to if not interested    Hypothyroidism  Assessment & Plan  Chronic -- continue home levothyroxine 25 mcg. Insomnia  Assessment & Plan  · Continue with melatonin as needed. Type 2 diabetes mellitus with hyperglycemia, without long-term current use of insulin [facility])  Assessment & Plan  Lab Results   Component Value Date    HGBA1C 7.1 (H) 07/09/2023       Recent Labs     07/12/23  1631 07/12/23  2107 07/13/23  0727 07/13/23  1113   POCGLU 145* 165* 82 116     · Diet controlled at home    Hyperkalemia-resolved as of 7/10/2023  Assessment & Plan  Resolved as of 7/10/2023  · Potassium 6.3 on admission, now 4.8 at discharge  · ED bicarb/calcium gluconate/albuterol/insulin/d50  · Nephrology consulted      Medical Problems     Resolved Problems  Date Reviewed: 7/12/2023          Resolved    Hyperkalemia 7/10/2023     Resolved by  Rosa Sen MD        Discharging Physician / Practitioner: Jefferson Cameron DO  PCP: Faisal Wallace MD  Admission Date:   Admission Orders (From admission, onward)     Ordered        07/09/23 1710  Inpatient Admission  Once                      Discharge Date: 07/13/23    Consultations During Hospital Stay:  · Neurosurgery, nephrology    Procedures Performed:   · None    Significant Findings / Test Results:   · CT abdomen pelvis performed 7/9/2023 showed age indeterminant compression fractures of the T7 and T8 vertebra.     Incidental Findings:   · None    Test Results Pending at Discharge (will require follow up):   ·  anti-GBM pending, follow-up with nephrology     Outpatient Tests Requested:  · BMP in 1 week per nephrology    Complications: None    Reason for Admission: JOSE, back pain    Hospital Course:   Latoya Hogue is a 80 y.o. female patient with PMH of hypothyroidism, DM who originally presented to the hospital on 7/9/2023 due to nontraumatic mid back pain. Patient found to have JOSE with peak creatinine of 6.42, hyperkalemia, metabolic acidosis. 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 Patient declined kidney biopsy, does not wish to pursue dialysis in case of worsening renal function. Creatinine trending down slowly, 4.9 on day of discharge. Nephrology has requested close follow-up, BMP to be repeated in 1 week. Compression fractures are being managed conservatively with Robaxin and oxycodone 2.5 mg 3 times daily as needed, TLSO bracing when ambulatory as needed. Patient likely to benefit from continued PT/OT. No surgical intervention warranted per neurosurgery at this time. Cleared by all consultants involved in her care prior to d/c    Please see above list of diagnoses and related plan for additional information. Condition at Discharge: stable    Discharge Day Visit / Exam:   Subjective: Patient seen at bedside this morning, sitting upright in chair. No acute distress, both daughters nearby. Patient notes continued pain in the mid back, but reports some improvement with Robaxin and oxycodone. Was able to work with PT yesterday and again today. Still reports poor appetite and poor sleep secondary to pain. No other complaints at this time. Discussed prospects of discharging to home today, patient and her daughters are agreeable with plan. Vitals: Blood Pressure: 126/73 (07/13/23 0740)  Pulse: 83 (07/13/23 0740)  Temperature: 97.6 °F (36.4 °C) (07/13/23 0740)  Temp Source: Oral (07/13/23 0740)  Respirations: 17 (07/13/23 0740)  Height: 4' 11" (149.9 cm) (07/09/23 2113)  Weight - Scale: 55 kg (121 lb 3.2 oz) (07/09/23 2113)  SpO2: 96 % (07/13/23 0740)  Exam:   Physical Exam  Vitals reviewed. Constitutional:       General: She is awake. She is not in acute distress.      Appearance: Normal appearance. She is not toxic-appearing. HENT:      Head: Normocephalic and atraumatic. Cardiovascular:      Rate and Rhythm: Normal rate and regular rhythm. Pulmonary:      Effort: Pulmonary effort is normal. No respiratory distress. Breath sounds: Normal breath sounds. No wheezing or rales. Abdominal:      General: Bowel sounds are normal. There is no distension. Palpations: Abdomen is soft. Tenderness: There is no abdominal tenderness. Musculoskeletal:      Right lower leg: No edema. Left lower leg: No edema. Neurological:      Mental Status: She is alert. Psychiatric:         Speech: Speech normal.         Behavior: Behavior normal. Behavior is cooperative. Discussion with Family: Updated  (daughter) at bedside. Discharge instructions/Information to patient and family:   See after visit summary for information provided to patient and family. Provisions for Follow-Up Care:  See after visit summary for information related to follow-up care and any pertinent home health orders. Disposition:   Home with [facility] Services (Reminder: Complete face to face encounter)    Planned Readmission: No     Discharge Statement:  I spent > 30 minutes discharging the patient. This time was spent on the day of discharge. I had direct contact with the patient on the day of discharge. Greater than 50% of the total time was spent examining patient, answering all patient questions, arranging and discussing plan of care with patient as well as directly providing post-discharge instructions. Additional time then spent on discharge activities. Discharge Medications:  See after visit summary for reconciled discharge medications provided to patient and/or family.       **Please Note: This note may have been constructed using a voice recognition system**

## 2023-07-13 NOTE — ASSESSMENT & PLAN NOTE
Baseline creat 0.8. Creatinine 6.42 > improving  · Ultrasound renal-No hydronephrosis. Tiny cyst upper pole of the right kidney. · C3 low, TAMIKO, ANCA neg. 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 · Patient has declined hemodialysis and kidney biopsy, daughters are aware  · Immunofixation with free lambda chain. outpatient follow-up with heme/onc for further evaluation if patient agreeable. This was discussed with patient's daughters as well.   · Continue sodium bicarb 650 mg 3 times daily for acid-base disorder  · Rpt labs as outpatient and f/u with PCP    Results from last 7 days   Lab Units 07/13/23  0537 07/12/23  0536 07/11/23  1042 07/10/23  0536 07/09/23  2155 07/09/23  1734 07/09/23  1608   BUN mg/dL 67* 65* 64* 78* 81* 82* 86*   CREATININE mg/dL 4.98* 5.11* 5.27* 5.82* 6.02* 5.91* 6.42*

## 2023-07-13 NOTE — PHYSICAL THERAPY NOTE
PT TREATMENT     07/13/23 1245   Note Type   Note Type Treatment   Pain Assessment   Pain Assessment Tool 0-10   Pain Score 6  (central LS area)   Restrictions/Precautions   Braces or Orthoses TLSO  (patient opted to not don brace at this time)   Other Precautions Chair Alarm; Bed Alarm; Fall Risk;Pain   General   Chart Reviewed Yes   Family/Caregiver Present Yes  (daughters present)   Cognition   Arousal/Participation Cooperative   Subjective   Subjective patient reports continued LS pain. Patient also states not wanting to don brace(TLSO) at this time,   Bed Mobility   Additional Comments patient sitting OOB in bedside chair upon arrival by therapist   Transfers   Sit to Stand 4  Minimal assistance   Additional items Assist x 1   Stand to Sit 4  Minimal assistance   Additional items Assist x 1   Ambulation/Elevation   Gait Assistance 4  Minimal assist   Additional items Assist x 1;Verbal cues; Tactile cues   Assistive Device Rolling walker   Distance 40 feet with change in direction, shortened stride length and cuing for upright standing and improved body mechanics   Balance   Static Sitting Fair   Dynamic Sitting Fair -   Static Standing Fair -   Dynamic Standing Fair -   Ambulatory Fair -   Activity Tolerance   Activity Tolerance Patient limited by fatigue;Patient limited by pain   Nurse Made Aware yes   Exercises   Hip Flexion Sitting;10 reps;Bilateral   Hip Abduction Sitting;10 reps;Bilateral   Knee AROM Long Arc Quad Sitting;10 reps;Bilateral   Ankle Pumps Sitting;10 reps;Bilateral   Balance training  sidestepping and backward walking completed for balance and coordination   Assessment   Assessment patient with improving functional mobility as pain is controled. Education completed with patient and family for use of TLSO, posture/ correction and appropriate activity level with pain control issues.  Patient will benefit from continued PT with home PT and increasing functional mobility with staff and family upon return home. The patient's AM-PAC Basic Mobility Inpatient Short Form Raw Score is 17. A Raw score of greater than 16 suggests the patient may benefit from discharge to home. Please also refer to the recommendation of the Physical Therapist for safe discharge planning. Plan   Treatment/Interventions ADL retraining;Functional transfer training;LE strengthening/ROM; Therapeutic exercise; Endurance training;Patient/family training;Equipment eval/education; Bed mobility;Gait training; Compensatory technique education   PT Frequency Other (Comment)  (5w)   Recommendation   PT Discharge Recommendation Home with home health rehabilitation   [facility] Mobility Inpatient   Turning in Flat Bed Without Bedrails 3   Lying on Back to Sitting on Edge of Flat Bed Without Bedrails 3   Moving Bed to Chair 3   Standing Up From Chair Using Arms 3   Walk in Room 3   Climb 3-5 Stairs With Railing 2   Basic Mobility Inpatient Raw Score 17   Basic Mobility Standardized Score 39.67   Highest Level Of Mobility   JH-HLM Goal 5: Stand one or more mins   JH-HLM Achieved 7: Walk 25 feet or more   Education   Patient Demonstrates acceptance/verbal understanding;Explanation/teachback used;Demonstrates verbal understanding;Reinforcement needed   Licensure   [place] License Number  Claudette Quintanilla PT [id number]

## 2023-07-13 NOTE — ASSESSMENT & PLAN NOTE
Resolved as of 7/10/2023  · Potassium 6.3 on admission, now 4.8 at discharge  · ED bicarb/calcium gluconate/albuterol/insulin/d50  · Nephrology consulted

## 2023-07-13 NOTE — PLAN OF CARE
Problem: OCCUPATIONAL THERAPY ADULT  Goal: Performs self-care activities at highest level of function for planned discharge setting. See evaluation for individualized goals. Description: Treatment Interventions: ADL retraining, Functional transfer training, UE strengthening/ROM, Endurance training, Patient/family training, Equipment evaluation/education, Activityengagement, Compensatory technique education          See flowsheet documentation for full assessment, interventions and recommendations. Outcome: Progressing  Note: Limitation: Decreased ADL status, Decreased UE strength, Decreased Safe judgement during ADL, Decreased endurance, Decreased high-level ADLs, Decreased self-care trans (decreased balance and mobility)  Prognosis: Good  Assessment: Patient seen for OT treatment. Patient requires min assist for transfers and standing for grooming today at sink. Patient requires max assist for BM hygiene. Patient with less pain and increase in function with ADLS today. Cooperative, pleasant and motivated to return home today.      OT Discharge Recommendation: Home with home health rehabilitation

## 2023-07-13 NOTE — CASE MANAGEMENT
Case Management Discharge Planning Note    Patient name Kimi Akins  Location [facility] [address]-* MRN [id number]  : 11/3/1931 Date 2023       Current Admission Date: 2023  Current Admission Diagnosis:Acute renal failure (ARF) [facility])   Patient Active Problem List    Diagnosis Date Noted   • Acute renal failure (ARF) ([address]) 2023   • Compression fracture of T8 vertebra ([address]) 2023   • Acute kidney injury superimposed on CKD ([address]) 2023   • Chronic renal disease, stage IV ([address]) 2023   • Other fatigue 01/15/2022   • Leg edema 01/15/2022   • Hypothyroidism due to medication 2021   • Benign essential microscopic hematuria 2021   • BMI 31.0-31.9,adult 2021   • Uncontrolled type 2 diabetes mellitus with hyperglycemia ([address]) 2021   • Abnormal urinalysis 2020   • Leg swelling 2020   • Need for vaccination with 13-polyvalent pneumococcal conjugate vaccine 2020   • Primary osteoarthritis of left ankle 2020   • Bilateral primary osteoarthritis of knee 2020   • Hypothyroidism 2020   • BMI 27.0-27.9,adult 2020   • Type 2 diabetes mellitus with hyperglycemia, without long-term current use of insulin ([address]) 2016   • Insomnia 2016   • Hyperlipidemia 2015   • Abnormal blood sugar 2015   • Pain in joint 07/10/2015      LOS (days): 4  Geometric Mean LOS (GMLOS) (days): 5.10  Days to GMLOS:1.2     OBJECTIVE:  Risk of Unplanned Readmission Score: 15.12         Current admission status: Inpatient   Preferred Pharmacy:   [facility] [address] SUNG ALEX [address] [place] [address]  Phone: [phone] Fax: [phone]    Primary Care Provider: Jade Brunson MD    Primary Insurance: MEDICARE  Secondary Insurance: BLUE CROSS    DISCHARGE DETAILS:    [address]         Is the patient interested in [address] at discharge?: Yes  [facility] requested[de-identified] Occupational Therapy, Physical [place] [facility] Name[de-identified] [facility] of [address] Provider[de-identified] PCP  Home Health Services Needed[de-identified] Strengthening/Theraputic Exercises to Improve Function, Evaluate Functional Status and Safety, Gait/ADL Training  Homebound Criteria Met[de-identified] Requires the Assistance of Another Person for Safe Ambulation or to Leave the Home, Uses an Assist Device (i.e. cane, walker, etc)  Supporting Clincal Findings[de-identified] Fatigues Easliy in [employer], Limited Endurance    DME Referral Provided  Referral made for DME?: No

## 2023-07-13 NOTE — ASSESSMENT & PLAN NOTE
Lab Results   Component Value Date    HGBA1C 7.1 (H) 07/09/2023       Recent Labs     07/12/23  1631 07/12/23  2107 07/13/23  0727 07/13/23  1113   POCGLU 145* 165* 82 116     · Diet controlled at home

## 2023-07-13 NOTE — NURSING NOTE
Instructions gone over with patient and both daughters that were at bedside. No questions at this time. Wheelchair to lobby.

## 2023-07-13 NOTE — PROGRESS NOTES
NEPHROLOGY PROGRESS NOTE   Renato Elders 80 y.o. female MRN: [id number]  Unit/Bed#: [address] [place] [address] Encounter: [id number]  Reason for Consult: JOSE    ASSESSMENT AND PLAN:  79 yo woman with PMH of UTI, urinary incontinence,  hypothyroidism, diabetes p/w back pain.  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 Cyndi Wilcox and myself, with patient and daughter) or kidney biopsy   • Patient is a stable for discharge, will need follow-up with nephrology. • BMP in 1 week w  • Message sent to  [facility] office for follow-up appointment  • Adjust medications to GFR  • GN work-up:  ? C3 low ( DDD, MGRS less likely postinfectious)  ?  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   • As per my conversation with patient she might not agree to [facility] biopsy  • Patient will follow-up as an outpatient     #Acid-base Disorder  • serum HCO3 20 mmol/L  • A  • Non-anion gap metabolic acidosis  • Secondary to JOSE  • Continue sodium bicarb 650 mg 3 times daily         #Volume status/hypertension:  • Volume: Euvolemic  • Blood pressure:  Normotensive /73mmhg ,  Goal<140/90  • Recommend:  • Torsemide 10 mg daily      #Anemia:  • Current hemoglobin: 7.4 mg/dL  • Treatment:  • Transfuse for hemoglobin less than 7.0 per primary service       #Hypocalcemia  • Ionized calcium 1  • At goal     #Borderline hyperphosphatemia   • Phosphorus 5.4 mg/dL, goal <5.5   • Low phosphorus diet at home  • No indication of binders    SUBJECTIVE:  Patient seen and examined at bedside. No chest pain, shortness of breath, nausea, vomiting, abdominal pain or diarrhea.      OBJECTIVE:  Current Weight: Weight - Scale: 55 kg (121 lb 3.2 oz)  Vitals:    07/13/23 0740   BP: 126/73   Pulse: 83   Resp: 17   Temp: 97.6 °F (36.4 °C)   SpO2: 96%     No intake or output data in the 24 hours ending 07/13/23 0913  Wt Readings from Last 3 Encounters:   07/09/23 55 kg (121 lb 3.2 oz)   04/25/23 61 kg (134 lb 6.4 oz)   10/11/22 62.5 kg (137 lb 12.8 oz)     Temp Readings from Last 3 Encounters:   07/13/23 97.6 °F (36.4 °C) (Oral)   04/25/23 97.9 °F (36.6 °C)   10/11/22 98.3 °F (36.8 °C)     BP Readings from Last 3 Encounters:   07/13/23 126/73   04/25/23 138/68   10/11/22 136/68     Pulse Readings from Last 3 Encounters:   07/13/23 83   04/25/23 64   10/11/22 70        General:  no acute distress at this time  Skin:  No acute rash  Eyes:  No scleral icterus and noninjected  ENT:  mucous membranes moist  Neck:  no carotid bruits  Chest:  Clear to auscultation percussion, good respiratory effort, no use of accessory respiratory muscles  CVS:  Regular rate and rhythm without rub   Abdomen:  soft and nontender   Extremities: no significant lower extremity edema  Neuro:  No gross focality  Psych:  Alert , cooperative       Medications:    Current Facility-Administered Medications:   •  acetaminophen (TYLENOL) tablet 975 mg, 975 mg, Oral, Q8H [address], CLARITA Diaz, 975 mg at 07/13/23 9921  •  calcium carbonate-vitamin D 500 mg-5 mcg tablet 1 tablet, 1 tablet, Oral, BID With Meals, Tristan Albarran MD, 1 tablet at 07/13/23 0740  •  docusate sodium (COLACE) capsule 100 mg, 100 mg, Oral, BID, CLARITA Diaz, 100 mg at 07/13/23 8521  •  gabapentin (NEURONTIN) capsule 100 mg, 100 mg, Oral, HS, CLARITA Diaz, 100 mg at 07/12/23 2113  •  heparin (porcine) subcutaneous injection 5,000 Units, 5,000 Units, Subcutaneous, Q8H [facility], 5,000 Units at 07/13/23 0519 **AND** [COMPLETED] Platelet count, , , Once, Javi Butler MD  •  HYDROmorphone (DILAUDID) injection 0.2 mg, 0.2 mg, Intravenous, Q4H PRN, CLARITA Diaz  •  insulin lispro (HumaLOG) 100 units/mL subcutaneous injection 1-5 Units, 1-5 Units, Subcutaneous, TID AC, 1 Units at 07/10/23 1700 **AND** Fingerstick Glucose (POCT), , , TID AC, Javikatie Butler MD  •  levothyroxine tablet 25 mcg, 25 mcg, Oral, QAM, Javi Butler MD, 25 mcg at 07/13/23 2022  •  lidocaine (LIDODERM) 5 % patch 1 patch, 1 patch, Topical, Daily, CLARITA Diaz, 1 patch at 07/13/23 0853  •  melatonin tablet 3 mg, 3 mg, Oral, HS PRN, Mariah Alfaro DO  •  methocarbamol (ROBAXIN) tablet 250 mg, 250 mg, Oral, Q6H [facility], CLARITA Diaz, 250 mg at 07/13/23 0519  •  ondansetron (ZOFRAN) injection 4 mg, 4 mg, Intravenous, Q6H PRN, Javi Butler MD, 4 mg at 07/12/23 1107  •  oxyCODONE (ROXICODONE) split tablet 2.5 mg, 2.5 mg, Oral, Q6H PRN, Mariah Alfaro DO, 2.5 mg at 07/13/23 6850  •  sodium bicarbonate tablet 650 mg, 650 mg, Oral, TID after meals, Delonte Lion MD, 650 mg at 07/13/23 0740  •  torsemide (DEMADEX) tablet 10 mg, 10 mg, Oral, Daily, Delonte Lion MD, 10 mg at 07/13/23 8123    Laboratory Results:  Results from last 7 days   Lab Units 07/13/23  0537 07/12/23  0536 07/11/23  1042 07/10/23  0536 07/09/23  2155 07/09/23  1734 07/09/23  1608   WBC Thousand/uL  --  5.59  --  4.79  --   --  6.99   HEMOGLOBIN g/dL  --  7.4*  --  7.2*  --   --  8.8*   HEMATOCRIT %  --  21.9*  --  22.1*  --   --  27.1*   PLATELETS Thousands/uL  --  146*  --  137*  --  164 189   SODIUM mmol/L 138 138 139 141 140 140 138   POTASSIUM mmol/L 4.8 4.9 5.1 4.8 5.0 5.2 6.3*   CHLORIDE mmol/L 107 107 109* 112* 111* 112* 109*   CO2 mmol/L 20* 18* 19* 16* 15* 13* 13*   BUN mg/dL 67* 65* 64* 78* 81* 82* 86*   CREATININE mg/dL 4.98* 5.11* 5.27* 5.82* 6.02* 5.91* 6.42*   CALCIUM mg/dL 9.6 9.2 9.0 7.5* 7.7* 7.7* 8.5   MAGNESIUM mg/dL  --   --   --  2.5  --   --   --    PHOSPHORUS mg/dL  --   --   --  5.4*  --   --   --        CT renal stone study abdomen pelvis without contrast   Final Result by Elayne Larios MD (07/09 1851)      1. No evidence of hydronephrosis or urinary tract calculus. 2. No acute inflammatory changes in the abdomen or pelvis   3. Compression fracture of the T8, and partial visualization of probable T7 compression fracture. Age indeterminant, but not present in 2013               Workstation performed: [id number]         XR spine thoracic 3 vw    (Results Pending)   XR spine thoracic 2 vw    (Results Pending)       Portions of the record may have been created with voice recognition software. Occasional wrong word or "sound a like" substitutions may have occurred due to the inherent limitations of voice recognition software. Read the chart carefully and recognize, using context, where substitutions have occurred.

## 2023-07-13 NOTE — OCCUPATIONAL THERAPY NOTE
OT TREATMENT         07/13/23 1300   Note Type   Note Type Treatment   Pain Assessment   Pain Assessment Tool 0-10   Pain Score 6   Pain Location/Orientation Location: Back   Restrictions/Precautions   Braces or Orthoses TLSO   Other Precautions Chair Alarm; Bed Alarm; Fall Risk;Spinal precautions   ADL   Grooming Assistance 4  Minimal Assistance   Grooming Deficit Wash/dry hands;Standing with assistive device   Grooming Comments verbal cues, setup, at sink, balance loss posteriorly, setup   UB Dressing Assistance 2  Maximal Assistance   UB Dressing Comments jose miguel/doff TLSO   Toileting Assistance  2  Maximal Assistance   Toileting Deficit Perineal hygiene   Toileting Comments BM incontinence hygiene max assist, setup only for urination hygiene, Toilet transfer min assist   Transfers   Sit to Stand 4  Minimal assistance   Stand to Sit 4  Minimal assistance   Functional Mobility   Functional Mobility 4  Minimal assistance   Additional Comments to and from bathroom with RW, verbal cues   Cognition   Overall Cognitive Status [facility]   Additional Activities   Additional Activities Comments 2 daughters present for session, answered questions about home OT and home ADLS, daughters and patient verbalized understanding   Activity Tolerance   Activity Tolerance Patient limited by pain; Patient limited by fatigue   Assessment   Assessment Patient seen for OT treatment. Patient requires min assist for transfers and standing for grooming today at sink. Patient requires max assist for BM hygiene. Patient with less pain and increase in function with ADLS today. Cooperative, pleasant and motivated to return home today. The patient's raw score on the AM-PAC Daily Activity Inpatient Short Form is 16. A raw score of less than 19 suggests the patient may benefit from discharge to post-acute rehabilitation services, however pt is going to have 24 care at home from daughter who can assist with ADLS, recommending home OT.  Please refer to the recommendation of the Occupational Therapist for safe discharge planning. Plan   Treatment Interventions ADL retraining;Functional transfer training; Endurance training;Patient/family training;Equipment evaluation/education; Activityengagement; Compensatory technique education   OT Frequency 3-5x/wk   Recommendation   OT Discharge Recommendation Home with home health rehabilitation   Additional Comments  24 hour care provided by daughter   AM-PAC Daily Activity Inpatient   Lower Body Dressing 2   Bathing 2   Toileting 2   Upper Body Dressing 3   Grooming 3   Eating 4   Daily Activity Raw Score 16   Daily Activity Standardized Score (Calc for Raw Score >=11) 35.96   AM-PAC Applied Cognition Inpatient   Following a Speech/Presentation 3   Understanding Ordinary Conversation 4   Taking Medications 3   Remembering Where Things Are Placed or Put Away 3   Remembering List of 4-5 Errands 3   Taking Care of Complicated Tasks 2   Applied Cognition Raw Score 18   Applied Cognition Standardized Score 38.77   Licensure   [place] License Number  [facility] [id number] [facility] OTR/L [id number]

## 2023-07-13 NOTE — TELEPHONE ENCOUNTER
Edith YOUNG   from  [facility] calling to verify if Faisal Wallace MD would follow up with home care orders.      # [phone]

## 2023-07-14 ENCOUNTER — TELEPHONE (OUTPATIENT)
Dept: HEMATOLOGY ONCOLOGY | Facility: CLINIC | Age: 88
End: 2023-07-14

## 2023-07-14 ENCOUNTER — TRANSITIONAL CARE MANAGEMENT (OUTPATIENT)
Dept: FAMILY MEDICINE CLINIC | Facility: CLINIC | Age: 88
End: 2023-07-14

## 2023-07-14 ENCOUNTER — TELEPHONE (OUTPATIENT)
Dept: FAMILY MEDICINE CLINIC | Facility: CLINIC | Age: 88
End: 2023-07-14

## 2023-07-14 PROBLEM — D64.9 ANEMIA: Status: ACTIVE | Noted: 2023-01-01

## 2023-07-14 NOTE — TELEPHONE ENCOUNTER
Please call and inform that yes I can follow w home care--they can fax orders to office for me to sign--ty

## 2023-07-14 NOTE — TELEPHONE ENCOUNTER
Good morning. My name is [place]. I'm calling from the [employer] regarding a home care referral [address] Y[address]: 1923. I have Doctor Candace Kramer listed as her primary physician. I just needed confirmation that the doctor can fall for home care. My direct number is [phone] if you could please give me a call back. Thank you. You received a voice mail from [facility].

## 2023-07-14 NOTE — TELEPHONE ENCOUNTER
I called Jose Bermeo in response to a referral that was received for patient to establish care with Hematology. Outreach was made to schedule a consultation. .    I left a voicemail explaining the reason for my call and advised patient to call [facility] at [phone]. Another attempt will be made to contact patient.

## 2023-07-14 NOTE — ASSESSMENT & PLAN NOTE
Hb low but acceptable, appears macrocytic  Iron panel reviewed.  Folate low with low normal Vitamin E78  Started on folic acid with vitamin B12 supplementation on d/c    Results from last 7 days   Lab Units 07/13/23  1123 07/12/23  0536 07/10/23  0536 07/09/23  1608   HEMOGLOBIN g/dL 8.1* 7.4* 7.2* 8.8*   HEMATOCRIT % 24.0* 21.9* 22.1* 27.1*   MCV fL 101* 100* 102* 101*

## 2023-07-17 NOTE — TELEPHONE ENCOUNTER
Called patient's daughter Kriss Mcnamara to schedule hospital follow up. Kriss Mcnamara declines scheduling for now. Patient has appt with PCP this week.  She will call back if she changes her mind

## 2023-07-17 NOTE — TELEPHONE ENCOUNTER
----- Message from [facility] sent at 7/17/2023  9:35 AM EDT -----    ----- Message -----  From: Milagros Jordan MD  Sent: 7/13/2023   7:37 AM EDT  To: Nephrology Heath Brar,     Mrs Prabhjot Nichols will be discharged from [facility] the next 24-48h and she will need a follow up with [facility] within 2-3 weeks     Thanks     Hunter Odonnell

## 2023-07-19 NOTE — PROGRESS NOTES
Virtual TCM Visit:    Verification of patient location:    Patient is located at Home in the following state in which I hold an active license [place]    Assessment/Plan:        Problem List Items Addressed This Visit     Acute kidney injury superimposed on CKD ([address])    Compression fracture of T8 vertebra ([address])    Hypothyroidism    Primary osteoarthritis involving multiple joints    Type 2 diabetes mellitus with hyperglycemia, without long-term current use of insulin ([address]) - Primary    Relevant Medications    Blood Glucose Monitoring Suppl (OneTouch Verio Reflect) w/Device KIT    glucose blood (OneTouch Verio) test strip    OneTouch Delica Lancets 66V MISC      Medications reconciled  PT and Home VNA in place  +Family support system--Dtrs assisting in care    Reason for visit is tcm    Encounter provider Kyrie Brewster MD     Provider located at [address] [address]    Recent Visits  Date Type Provider Dept   07/19/23 [address] [place], [place] [facility] recent visits within past 7 days and meeting all other requirements  Future Appointments  No visits were found meeting these conditions. Showing future appointments within next 150 days and meeting all other requirements       After connecting through Lingto, the patient was identified by name and date of birth. Vinod Tripathi was informed that this is a telemedicine visit and that the visit is being conducted through Telephone. My office door was closed. No one else was in the room. She acknowledged consent and understanding of privacy and security of the video platform. The patient has agreed to participate and understands they can discontinue the visit at any time. Daughters present on call. Patient is aware this is a billable service. Transitional Care Management Review:  Vinod Tripathi is a 80 y.o. female here for TCM follow up.      During the TCM phone call patient stated:    [facility] care reviewed  Records reviewed    Patient was hospitialized at  [facility]    Date of Admission  07/09/23    Date of discharge  07/13/23    Diagnosis  renal failure, collasped vertabre    Disposition  Home    Were the patients medications reviewed and updated  Yes    Current Symptoms  Weakness    Weakness severity  Mild      TCM Call     Post hospital issues  None    Should patient be enrolled in anticoag monitoring? No    Scheduled for follow up? No    Did you obtain your prescribed medications  Yes    Do you need help managing your prescriptions or medications  Yes    Why type of assitance do you need  daughter helps    Is transportation to your appointment needed  Yes    I have advised the patient to call PCP with any new or worsening symptoms  Kamila Quezada/lee 7/14/23    Living Arrangements  Family members    Are you recieving any outpatient services  No    Are you recieving home care services  Yes    Types of home care services  Home PT; Nurse visit    Are you using any community resources  No    Current waiver services  No    Have you fallen in the last 12 months  No    Interperter language line needed  No      hospital records reviewed  RF-declines dialysis  +family support  req home glucose monitor  Home PT and VNA established    Subjective:     Patient ID: Megan Price is a 80 y.o. female. HPI  Review of Systems   Constitutional: Positive for activity change, appetite change and fatigue. Respiratory: Negative for wheezing. Cardiovascular: Positive for palpitations. Gastrointestinal: Negative for abdominal pain. Musculoskeletal: Positive for arthralgias and back pain. Neurological: Positive for weakness.          Objective:    Vitals:    07/19/23 0930   BP: 121/57   BP Location: Left arm   Patient Position: Sitting   Cuff Size: Standard   Pulse: 88   SpO2: 95%       Physical Exam    Audio visit only  Medications have been reviewed by provider in current encounter    I spent 15 minutes with the patient during this visit. Shine Helms MD      VIRTUAL VISIT [address] verbally agrees to participate in [facility]. Pt is aware that [facility] could be limited without vital signs or the ability to perform a full hands-on physical Najma Hess understands she or the provider may request at any time to terminate the video visit and request the patient to seek care or treatment in person.

## 2023-07-22 PROBLEM — R77.8 ELEVATED TROPONIN: Status: ACTIVE | Noted: 2023-01-01

## 2023-07-22 PROBLEM — I95.9 HYPOTENSION: Status: ACTIVE | Noted: 2023-01-01

## 2023-07-22 PROBLEM — M15.9 PRIMARY OSTEOARTHRITIS INVOLVING MULTIPLE JOINTS: Status: ACTIVE | Noted: 2023-01-01

## 2023-07-22 PROBLEM — T68.XXXA HYPOTHERMIA: Status: ACTIVE | Noted: 2023-01-01

## 2023-07-22 PROBLEM — E87.1 HYPONATREMIA: Status: ACTIVE | Noted: 2023-01-01

## 2023-07-22 PROBLEM — G93.40 ACUTE ENCEPHALOPATHY: Status: ACTIVE | Noted: 2023-01-01

## 2023-07-22 NOTE — PROGRESS NOTES
[facility] Patient noted to be sitting with family, awake and alert at this time. RN performed hourly rounding and noted patient was hypotensive and lethargic, responsive to sternal rub. Pt hypoglycemic and hypothermic. Rapid response called.

## 2023-07-22 NOTE — ASSESSMENT & PLAN NOTE
Potassium 5.9 upon admission  Patient was ordered for calcium gluconate 1 g x 1 dose along with insulin with D50  Follow-up BMP  Patient will be on low potassium diet  Patient was started on bicarbonate drip as per nephrology recommendations

## 2023-07-22 NOTE — ASSESSMENT & PLAN NOTE
Patient's temperature was 93.3 upon admission  Patient has no white count  Etiology not clear at the current time  Continue Mathieu iqbal  Follow-up septic work-up  UA showed moderate leukocytosis with 20-30 white cells  Follow-up urine cultures, blood cultures x2  Monitor temperature and white count  Procalcitonin level was minimally elevated at 0.47

## 2023-07-22 NOTE — ASSESSMENT & PLAN NOTE
Troponin was elevated at 196 which is coming down. EKG showed no acute ST-T changes  Likely non-MI troponin elevation in the setting of acute kidney injury  2D echo was performed rule out wall motion abnormalities which showed EF of around 45% with mildly reduced systolic function with global hypokinesis with aortic valve sclerosis with mildly dilated IVC. Patient will be started on aspirin 81 mg p.o. daily  Based on further course will have discussion with the family regarding further management options.

## 2023-07-22 NOTE — RAPID RESPONSE
Rapid Response Note  Dewayne Curran 80 y.o. female MRN: [id number]  Unit/Bed#: [address] [place] [address] Encounter: [id number]    Rapid Response Notification(s):   Response called date/time:  7/15/2023 1:11 PM  Response team arrival date/time:  7/22/2023 1:15 PM  Response end date/time:  7/22/2023 1:20 PM  Level of care:  Stepdown 2  Rapid response location:  [facility]  Primary reason for rapid response call:  Acute change in neuro status, acute change in BP and other (comment) (hypothermic )    Rapid Response Intervention(s):   Airway:  None  Breathing:  None  Circulation:  None  Fluids administered:  Colloids (albumin )       Assessment:   · JOSE on CKD with admission creatinine 8.78  · Altered mental status  · Hypotension  · Hypothermia    Plan:   · Nephrology consulted today, on admission -recommended IV fluids, if no improvement comfort measures  · In the setting of elevated creatinine, hyperkalemia -no further imaging since patient does not want any aggressive measures  · Consider small amount of fluid resuscitation since patient is most likely hypovolemic in the setting of vomiting given known EF -primary team ordered albumin  · Mathieu iqbal  · Critical care agrees with nephrology that if the patient does not respond to fluid resuscitation, would encourage comfort measures   · Critical care does not feel that a transfer to ICU,  and/or vasopressors is warranted based on history and presentation     Rapid Response Outcome:   Transfer:  Remain on floor  Primary service notified of transfer: No    Code Status: Level 3 (DNAR and DNI)           Background/Situation:   Dewayne Curran is a 80 y.o. female who to the emergency room on 7/22 with complaints of vomiting, weakness, and no sleep. Her past medical history includes CKD, elevated troponins, compression fracture of T8, hypothyroidism, hyperlipidemia, and type 2 diabetes.   Patient was recently discharged a few weeks ago where she was treated for severe acute kidney injury. At that time she did not want any aggressive measures including dialysis. Approximately 1 year ago, her creatinine was normal, but has been progressively getting worse. On admission today creatinine is 8.78. Nephrology was consulted on admission, and discussed the plan of care with cardiology and the primary team.  The recommendation was if the patient had no response to fluids,  comfort measures should be considered. Review of Systems   Unable to perform ROS: Patient nonverbal       Objective:   Vitals:    07/22/23 0856 07/22/23 0931 07/22/23 1253 07/22/23 1304   BP: 122/61 122/61 (!) 74/41 (!) 84/52   BP Location:       Pulse: 86 86 74    Resp: 18  18    Temp:   (!) 94.8 °F (34.9 °C) (!) 94.7 °F (34.8 °C)   TempSrc:   Tympanic Tympanic   SpO2: 95% 95% 96%    Weight:  54.9 kg (121 lb)     Height:  4' 11" (1.499 m)       Physical Exam  Vitals and nursing note reviewed. Constitutional:       Appearance: She is ill-appearing and toxic-appearing. Comments: Lethargic but arousable   HENT:      Head: Normocephalic and atraumatic. Right Ear: Tympanic membrane, ear canal and external ear normal.      Left Ear: Tympanic membrane, ear canal and external ear normal.      Nose: Nose normal.      Mouth/Throat:      Mouth: Mucous membranes are dry. Pharynx: Oropharynx is clear. Eyes:      Comments: Bilateral pupils sluggish but reactive    Cardiovascular:      Rate and Rhythm: Normal rate and regular rhythm. Pulses: Normal pulses. Heart sounds: Normal heart sounds. Pulmonary:      Comments: On room air  Bilateral lungs diminished  Abdominal:      General: Bowel sounds are normal.      Palpations: Abdomen is soft. Genitourinary:     Comments: Due to void  Musculoskeletal:         General: Normal range of motion. Cervical back: Normal range of motion and neck supple. Skin:     General: Skin is warm and dry. Capillary Refill: Capillary refill takes less than 2 seconds. Neurological:      Comments: Lethargic but arouses to voice and pain  Does not follow commands         Portions of the record may have been created with voice recognition software. Occasional wrong word or "sound a like" substitutions may have occurred due to the inherent limitations of voice recognition software. Read the chart carefully and recognize, using context, where substitutions have occurred.     [id number] [facility]

## 2023-07-22 NOTE — ASSESSMENT & PLAN NOTE
Hemoglobin upon admission was 7.2  No evidence of active bleeding  Patient noted to have microcytic anemia  During prior work-up patient noted to have low folate with normal vitamin B12 level  Patient was given 1 unit of PBC transfusion  Monitor hemoglobin to keep hemoglobin over 8.
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
## 2023-07-22 NOTE — ASSESSMENT & PLAN NOTE
Sodium level 126 upon admission  Patient received normal saline 100 mL bolus in the ED and is being continued on bicarbonate drip  Follow-up BMP

## 2023-07-22 NOTE — ED NOTES
Pt. Bladder scan at 410 ML. Unable to provider urine sample.       Antonina Potts, RN  07/22/23 0151
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
## 2023-07-22 NOTE — PROGRESS NOTES
Discussed with patient’s daughter Elyse Cruz and Iván Bhakta regarding the patient’s clinical decline, hypotension, and altered mental status since the rapid response in the early afternoon. We discussed what the next steps would be if the patient were to come to the ICU, which include the administration of vasopressors and the potential need for additional intravenous access/central lines. I expressed that given the continued decline in t he patient’s renal function and the decision not to pursue dialysis, the patient may be nearing the end of life. Given the family’s wish to limit pain, discomfort, and aggressive measures, we came to a consensus for the patient to transition to comfort care.      Discussed w Dr Daniel Padilla

## 2023-07-22 NOTE — ASSESSMENT & PLAN NOTE
During prior hospitalization work-up showed T8 compression fracture  Continue calcium with vitamin D supplementation, Lidoderm patch and Tylenol as needed

## 2023-07-22 NOTE — PLAN OF CARE
Problem: Anticipatory Grief  Goal: Patient/caregiver/family will explore reactions to and verbalize acceptance of impending loss  Outcome: Progressing       Problem: Comfort deficit  Goal: Patient will report/nurse will observe that symptoms have been reduced or controlled  Description: Patient will report that pain has been reduced or controlled through verbal or nonverbal means and that measures to promote comfort are effective   Outcome: Progressing  Note: Symptoms being managed:

## 2023-07-22 NOTE — QUICK NOTE
Patient continues to be hypotensive and hypothermic. Conversation had with ICU attending Dr. Angeles Culver and the patients daughters Charli Marr and Ting Sin via telephone and it was decided to transition the patient to comfort care. Previous orders were discontinued and comfort care measures were ordered. Patient currently looks comfortable with no S/S of distress noted. Both daughters at the bedside and confirmed they just want her "to be comfortable". They were tearful but appreciative. Patient will be transferred to a private room on the [facility] floor and comfort orders to be carried out. Updated nursing.

## 2023-07-22 NOTE — DISCHARGE SUMMARY
[address]  Discharge- Sandie Oshea 11/3/1931, 80 y.o. female MRN: [id number]  Unit/Bed#: [address] [place] [address] Encounter: [id number]  Primary Care Provider: Raiza Parker MD   Date and time admitted to hospital: 2023  5:09 AM    * Acute kidney injury superimposed on CKD [facility])  Assessment & Plan  Lab Results   Component Value Date    EGFR 3 2023    EGFR 7 2023    EGFR 6 2023    CREATININE 8.78 (H) 2023    CREATININE 4.98 (H) 2023    CREATININE 5.11 (H) 2023   Rapid progression of CKD. Creatinine upon discharge was 4.9  She had a recent hospitalization for acute kidney injury superimposed on CKD-patient declined hemodialysis and kidney biopsy previously. 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 Patient was started on bicarbonate drip as per nephrology recommendations    Rapid response was called around 1:30 PM due to acute change in mental status, hypotension and hypothermia. Critical care team had a lengthy discussion with family, patient was made comfort care. Patient  at 07:19PM, daughters were at bedside. Hypotension  Assessment & Plan  · As above  · Patient was made comfort care.     Acute encephalopathy  Assessment & Plan  · As above  · Patient was made comfort care        Discharging Physician / Practitioner: Pablo Paige, [address] [place] [address]  PCP: Raiza Parker MD  Admission Date: 2023  Discharge Date: 23    Reason for Admission: Vomiting (Patient  c/o vomiting for one hour, feels weak, has not slept)        Medical Problems     Resolved Problems  Date Reviewed: 2023   None         Consultations During Hospital Stay:  Zara Leaver TO NEPHROLOGY    Procedures Performed:     · NA    Significant Findings / Test Results:     · JOSE  Results from last 7 days   Lab Units 07/22/23  1041 07/22/23  0556   WBC Thousand/uL  --  6.03   HEMOGLOBIN g/dL  --  7.2*   PLATELETS Thousands/uL 184 208     Results from last 7 days   Lab Units 07/22/23  0556   SODIUM mmol/L 126*   POTASSIUM mmol/L 5.9*   CHLORIDE mmol/L 88*   CO2 mmol/L 18*   BUN mg/dL 96*   CREATININE mg/dL 8.78*   CALCIUM mg/dL 8.6   TOTAL BILIRUBIN mg/dL 0.33   ALK PHOS U/L 57   ALT U/L 17   AST U/L 23             Lab Results   Component Value Date/Time    HGBA1C 7.1 (H) 07/09/2023 05:34 PM    HGBA1C 7.9 01/10/2018 11:02 AM     Results from last 7 days   Lab Units 07/22/23  1621 07/22/23  1617 07/22/23  1457 07/22/23  1453 07/22/23  1314 07/22/23  1309 07/22/23  0914   POC GLUCOSE mg/dl 80 84 101 95 429* 60* 99     Results from last 7 days   Lab Units 07/22/23  0556   PROCALCITONIN ng/ml 0.47*     Blood Culture:   Lab Results   Component Value Date    BLOODCX Received in Microbiology Lab. Culture in Progress. 07/22/2023    BLOODCX Received in Microbiology Lab. Culture in Progress. 07/22/2023     Urine Culture:   Lab Results   Component Value Date    URINECX >100,000 cfu/ml Citrobacter freundii (A) 06/14/2023    URINECX 10,000-19,000 cfu/ml 06/14/2023    URINECX No Growth <1000 cfu/mL 05/23/2023     Sputum Culture: No components found for: "[place]"  Wound Culture: No results found for: "WOUNDCULT"     Imaging  XR chest 1 view portable   Final Result by Mauro Osorio MD (07/22 1704)      Low lung volumes with vascular crowding. Question pulmonary venous congestion and trace effusion.                Workstation performed: [id number]               Incidental Findings:   · NA     Test Results Pending at Discharge (will require follow up):   · NA     Outpatient Tests Requested:  · NA    Complications:  NA    Reason for Admission: JOSE on CKD    Hospital Course:     PER HPI: Manny Stoll is a 80 y.o. female patient with a PMH of CKD, anemia, hypothyroid, type 2 diabetes, hyperlipidemia who originally presented to the hospital on 2023 due to acute kidney injury on CKD, anemia. Rapid response was called around 1:30 PM due to acute change in mental status, hypotension, hypothermia. Patient was made comfort care after lengthy discussion between family and critical care team.  Patient  at 7:19 PM peacefully. Daughters were at bedside. Hospital course:   Please see above list of diagnoses and related plan for additional information. Condition at Discharge: Patient        Discharge instructions/Information to patient and family:   See after visit summary for information provided to patient and family. Provisions for Follow-Up Care:  See after visit summary for information related to follow-up care and any pertinent home health orders. Disposition:     Other: Patient     Planned Readmission: NA     Discharge Statement:  I spent 15 minutes discharging the patient. This time was spent on the day of discharge. I had direct contact with the patient on the day of discharge. Greater than 50% of the total time was spent examining patient, answering all patient questions, arranging and discussing plan of care with patient as well as directly providing post-discharge instructions. Additional time then spent on discharge activities. Discharge Medications:  See after visit summary for reconciled discharge medications provided to patient and family.       ** Please Note: This note has been constructed using a voice recognition system **

## 2023-07-22 NOTE — ASSESSMENT & PLAN NOTE
Lab Results   Component Value Date    EGFR 3 07/22/2023    EGFR 7 07/13/2023    EGFR 6 07/12/2023    CREATININE 8.78 (H) 07/22/2023    CREATININE 4.98 (H) 07/13/2023    CREATININE 5.11 (H) 07/12/2023   Rapid progression of CKD. Creatinine upon discharge was 4.9  She had a recent hospitalization for acute kidney injury superimposed on CKD-patient declined hemodialysis and kidney biopsy previously. 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   Patient was started on bicarbonate drip as per nephrology recommendations  If no response to IV fluids for the next 24 hours, might need to consider comfort care

## 2023-07-22 NOTE — DEATH NOTE
INPATIENT DEATH NOTE  Anca Moura 80 y.o. female MRN: [id number]  Unit/Bed#: [address] [place] [address] Encounter: [id number]    Date, Time and Cause of Death    Date of Death: 23  Time of Death:  7:19 PM  Preliminary Cause of Death: Acute kidney injury superimposed on CKD ([address] )  Entered by: Shania OTTO[CY1.1]     Attribution     CY1.1 Shania Sinclair, [address] [place] [address] 23 19:33           Patient's Information  Pronounced by: Hernando Nieves  Did the patient's death occur in the ED?: No  Did the patient's death occur in the OR?: No  Did the patient's death occur less than 10 days post-op?: No  Did the patient's death occur within 24 hours of admission?: Yes  Was code status DNR at the time of death?: Yes (Comfort care only)    PHYSICAL EXAM:  Spontaneous respirations absent. No corneal reflex. Dilated pupils. No  Heart tone.     Medical Examiner notification criteria:  Patient  within 24 hours of arrival to hospital   Medical Examiner's office notified?:  Yes   Medical Examiner accepted case?:  No  Name of Medical Examiner: Lexy Xiao    Family Notification  Was the family notified?: Yes  Date Notified: 23  Time Notified:   Notified by: Shania OTTO  Name of Family Notified of Death: Phil Woodard Person Relationship to Patient: Daughter  Family Notification Route: Telephone  Was the family told to contact a  home?: Yes  Name of [address] [place] [address][de-identified] [place] in [place]    Autopsy Options:  NA    Primary Service Attending Physician notified?:  yes - Attending:  Kandice Barbour MD    Physician/Resident responsible for completing Discharge Summary:  Hernando Nieves
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
## 2023-07-22 NOTE — ASSESSMENT & PLAN NOTE
Lab Results   Component Value Date    EGFR 3 2023    EGFR 7 2023    EGFR 6 2023    CREATININE 8.78 (H) 2023    CREATININE 4.98 (H) 2023    CREATININE 5.11 (H) 2023   Rapid progression of CKD. Creatinine upon discharge was 4.9  She had a recent hospitalization for acute kidney injury superimposed on CKD-patient declined hemodialysis and kidney biopsy previously. 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 Patient was started on bicarbonate drip as per nephrology recommendations    Rapid response was called around 1:30 PM due to acute change in mental status, hypotension and hypothermia. Critical care team had a lengthy discussion with family, patient was made comfort care. Patient  at 07:19PM, daughters were at bedside.

## 2023-07-23 LAB
ATRIAL RATE: 88 BPM
BACTERIA UR CULT: NORMAL
PR INTERVAL: 240 MS
QRS AXIS: 263 DEGREES
QRSD INTERVAL: 126 MS
QT INTERVAL: 400 MS
QTC INTERVAL: 484 MS
T WAVE AXIS: 61 DEGREES
VENTRICULAR RATE: 88 BPM

## 2023-07-23 NOTE — NURSING NOTE
Patient discharged to [facility] following expiration via stretcher accompanied by PCA and security. Discharged with jewelry on person per family, 3 yellow metal rings and a pair of earrings. No other belongings at bedside. [employer] contacted, Bill Teran, patient did not meet screening for age. Medical examiner notified, as patient  less than 24 hours since admission, released by Medical examiner. Family notified by provider, Brittney Sanderson, [address] [place] [address].

## 2023-07-24 LAB
ABO GROUP BLD BPU: NORMAL
BPU ID: NORMAL
CROSSMATCH: NORMAL
UNIT DISPENSE STATUS: NORMAL
UNIT PRODUCT CODE: NORMAL
UNIT PRODUCT VOLUME: 350 ML
UNIT RH: NORMAL

## 2023-07-25 LAB
BACTERIA BLD CULT: ABNORMAL
GRAM STN SPEC: ABNORMAL

## 2023-07-25 NOTE — ED PROCEDURE NOTE
PROCEDURE  CriticalCare Time    Date/Time: 7/22/2023 6:40 AM    Performed by: Amarjit Tam MD  Authorized by: Amarjit Tam MD    Critical care provider statement:     Critical care time (minutes):  40    Critical care time was exclusive of:  Separately billable procedures and treating other patients and teaching time    Critical care was necessary to treat or prevent imminent or life-threatening deterioration of the following conditions:  Renal failure    Critical care was time spent personally by me on the following activities:  Review of old charts, re-evaluation of patient's condition, ordering and review of radiographic studies, ordering and review of laboratory studies, ordering and performing treatments and interventions, obtaining history from patient or surrogate, development of treatment plan with patient or surrogate, evaluation of patient's response to treatment and examination of patient         Amarjit Tam MD  07/24/23 1912

## 2023-07-25 NOTE — ASSESSMENT & PLAN NOTE
Lab Results   Component Value Date    HGBA1C 7.1 (H) 07/09/2023       Recent Labs     07/22/23  0914 07/22/23  1309 07/22/23  1314   POCGLU 99 60* 429*       Blood Sugar Average: Last 72 hrs:  (P) 196   Monitor blood sugars before every meal and at bedtime with Humalog sliding scale.   Diet controlled at home Reviewed how to start with 1/2 tablets (5mg Celexa) and titrate up to full one tablet (10mg) after a couple weeks; can stay on this dose for 4-6 weeks before going to 15mg

## 2023-07-27 LAB — BACTERIA BLD CULT: NORMAL

## 2024-10-04 NOTE — PROGRESS NOTES
NEPHROLOGY PROGRESS NOTE   Alexei Marquez 80 y.o. female MRN: [id number]  Unit/Bed#: [address] [place] [address] Encounter: [id number]  Reason for Consult: JOSE    ASSESSMENT AND PLAN:  79 yo woman with PMH of UTI, urinary incontinence,  hypothyroidism, diabetes p/w back pain. 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 Iesha Valles and myself) or kidney biopsy   • Maintain MAP:  Over 65 mmHg if possible/avoid hypoperfusion:  Hold parameters on blood pressure medications  • Avoid nephrotoxic agents such as NSAIDs, and IV contrast if possible. Avoid opioids   • Adjust medications to GFR  • GN work-up:  ? C3 low (possible postinfection, DDD, MGRS)  ?  ANCA, anti-GBM, TAMIKO, SPEP, kappa lambda ratio pending       #Light chain restriction   · Immunofixation with free lambda chain   · Hem/on evaluation    · Probably MGR as cause of JOSE  · As per my conversation with patient she might not agree to BM biopsy    #Acid-base Disorder  • serum HCO3 16 mmol/L  • A  • Non-anion gap metabolic acidosis  • Secondary to JOSE  • Start sodium bicarb 650 mg twice daily     #Volume status/hypertension:  • Volume: Euvolemic  • Blood pressure: Normotensive /85mmhg ,  Goal<140/90  • Recommend:  • Dc IVF   • Enforce fluid intake     #Anemia:  • Current hemoglobin: 7.2 mg/dL  • Treatment:  • Transfuse for hemoglobin less than 7.0 per primary service       #Hypocalcemia  • Ionized calcium   • 1 at goal     #Borderline hyperphosphatemia   • Phosphorus 5.4 mg/dL, goal <5.5   • Low phosphorus diet    Discussed with Dr. Arianna Jansen. Light chain restriction. We andrey to proceed with hem/onc ocnsult           SUBJECTIVE:  Patient seen and examined at bedside. No chest pain, shortness of breath, nausea, vomiting, abdominal pain or diarrhea.    OBJECTIVE:  Current Weight: Weight - Scale: 55 kg (121 lb 3.2 oz)  Vitals:    07/11/23 0732   BP: 113/55   Pulse: 88   Resp: 17   Temp: 97.7 °F (36.5 °C)   SpO2: 98%       Intake/Output Summary (Last 24 hours) at 7/11/2023 1005  Last data filed at 7/11/2023 0900  Gross per 24 hour   Intake 120 ml   Output 800 ml   Net -680 ml     Wt Readings from Last 3 Encounters:   07/09/23 55 kg (121 lb 3.2 oz)   04/25/23 61 kg (134 lb 6.4 oz)   10/11/22 62.5 kg (137 lb 12.8 oz)     Temp Readings from Last 3 Encounters:   07/11/23 97.7 °F (36.5 °C) (Oral)   04/25/23 97.9 °F (36.6 °C)   10/11/22 98.3 °F (36.8 °C)     BP Readings from Last 3 Encounters:   07/11/23 113/55   04/25/23 138/68   10/11/22 136/68     Pulse Readings from Last 3 Encounters:   07/11/23 88   04/25/23 64   10/11/22 70        General:  no acute distress at this time  Skin:  No acute rash  Eyes:  No scleral icterus and noninjected  ENT:  mucous membranes moist  Neck:  no carotid bruits  Chest:  Clear to auscultation percussion, good respiratory effort, no use of accessory respiratory muscles  CVS:  Regular rate and rhythm without rub   Abdomen:  soft and nontender   Extremities: no significant lower extremity edema  Neuro:  No gross focality  Psych:  Alert , cooperative         Medications:    Current Facility-Administered Medications:   •  acetaminophen (TYLENOL) tablet 650 mg, 650 mg, Oral, Q6H PRN, Mu Rodriguez MD, 650 mg at 07/09/23 1925  •  acetaminophen (TYLENOL) tablet 975 mg, 975 mg, Oral, Q8H [facility], CLARITA Diaz, 975 mg at 07/11/23 7679  •  calcium carbonate-vitamin D 500 mg-5 mcg tablet 1 tablet, 1 tablet, Oral, BID With Meals, Mu Rodriguez MD, 1 tablet at 07/11/23 0099  •  docusate sodium (COLACE) capsule 100 mg, 100 mg, Oral, BID, CLARITA Diaz, 100 mg at 07/11/23 0907  •  gabapentin (NEURONTIN) capsule 100 mg, 100 mg, Oral, HS, CLARITA Diaz, 100 mg at 07/10/23 2100  •  heparin (porcine) subcutaneous injection 5,000 Units, 5,000 Units, Subcutaneous, Q8H [facility], 5,000 Units at 07/11/23 2829 **AND** [COMPLETED] Platelet count, , , Once, Michael Quinones MD  •  HYDROmorphone (DILAUDID) injection 0.2 mg, 0.2 mg, Intravenous, Q4H PRN, CLARITA Diaz, 0.2 mg at 07/10/23 2054  •  insulin lispro (HumaLOG) 100 units/mL subcutaneous injection 1-5 Units, 1-5 Units, Subcutaneous, TID AC, 1 Units at 07/10/23 1700 **AND** Fingerstick Glucose (POCT), , , TID AC, Michael Quinones MD  •  levothyroxine tablet 25 mcg, 25 mcg, Oral, QAM, Michael Quinones MD, 25 mcg at 07/11/23 0907  •  lidocaine (LIDODERM) 5 % patch 1 patch, 1 patch, Topical, Daily, CLARITA Diaz, 1 patch at 07/11/23 0907  •  ondansetron (ZOFRAN) injection 4 mg, 4 mg, Intravenous, Q6H PRN, Michael Quinones MD  •  oxyCODONE (ROXICODONE) split tablet 2.5 mg, 2.5 mg, Oral, Q8H PRN, CLARITA Diaz, 2.5 mg at 07/10/23 1120  •  sodium bicarbonate tablet 650 mg, 650 mg, Oral, BID after meals, Marilynn Felder MD, 650 mg at 07/11/23 8845    Laboratory Results:  Results from last 7 days   Lab Units 07/10/23  0536 07/09/23  2155 07/09/23  1734 07/09/23  1608   WBC Thousand/uL 4.79  --   --  6.99   HEMOGLOBIN g/dL 7.2*  --   --  8.8*   HEMATOCRIT % 22.1*  --   --  27.1*   PLATELETS Thousands/uL 137*  --  164 189   SODIUM mmol/L 141 140 140 138   POTASSIUM mmol/L 4.8 5.0 5.2 6.3*   CHLORIDE mmol/L 112* 111* 112* 109*   CO2 mmol/L 16* 15* 13* 13*   BUN mg/dL 78* 81* 82* 86*   CREATININE mg/dL 5.82* 6.02* 5.91* 6.42*   CALCIUM mg/dL 7.5* 7.7* 7.7* 8.5   MAGNESIUM mg/dL 2.5  --   --   --    PHOSPHORUS mg/dL 5.4*  --   --   --        CT renal stone study abdomen pelvis without contrast   Final Result by Gregg Hawk MD (07/09 7111)      1.  No evidence of hydronephrosis or urinary tract calculus. 2. No acute inflammatory changes in the abdomen or pelvis   3. Compression fracture of the T8, and partial visualization of probable T7 compression fracture. Age indeterminant, but not present in 2013               Workstation performed: [id number]         XR spine thoracic 3 vw    (Results Pending)       Portions of the record may have been created with voice recognition software. Occasional wrong word or "sound a like" substitutions may have occurred due to the inherent limitations of voice recognition software. Read the chart carefully and recognize, using context, where substitutions have occurred. 0